# Patient Record
Sex: MALE | Race: WHITE | NOT HISPANIC OR LATINO | Employment: OTHER | URBAN - METROPOLITAN AREA
[De-identification: names, ages, dates, MRNs, and addresses within clinical notes are randomized per-mention and may not be internally consistent; named-entity substitution may affect disease eponyms.]

---

## 2018-02-28 ENCOUNTER — OFFICE VISIT (OUTPATIENT)
Dept: INTERNAL MEDICINE | Facility: CLINIC | Age: 38
End: 2018-02-28
Payer: OTHER GOVERNMENT

## 2018-02-28 VITALS
SYSTOLIC BLOOD PRESSURE: 116 MMHG | DIASTOLIC BLOOD PRESSURE: 72 MMHG | BODY MASS INDEX: 27.22 KG/M2 | HEIGHT: 71 IN | TEMPERATURE: 98 F | WEIGHT: 194.44 LBS | HEART RATE: 84 BPM

## 2018-02-28 DIAGNOSIS — J30.1 CHRONIC SEASONAL ALLERGIC RHINITIS DUE TO POLLEN: ICD-10-CM

## 2018-02-28 DIAGNOSIS — K21.9 GASTROESOPHAGEAL REFLUX DISEASE, ESOPHAGITIS PRESENCE NOT SPECIFIED: ICD-10-CM

## 2018-02-28 DIAGNOSIS — Z30.09 ENCOUNTER FOR VASECTOMY COUNSELING: ICD-10-CM

## 2018-02-28 DIAGNOSIS — K63.5 POLYP OF COLON, UNSPECIFIED PART OF COLON, UNSPECIFIED TYPE: ICD-10-CM

## 2018-02-28 DIAGNOSIS — Z83.3 FAMILY HISTORY OF DIABETES MELLITUS IN MOTHER: ICD-10-CM

## 2018-02-28 DIAGNOSIS — Z00.00 ROUTINE GENERAL MEDICAL EXAMINATION AT A HEALTH CARE FACILITY: Primary | ICD-10-CM

## 2018-02-28 PROCEDURE — 99385 PREV VISIT NEW AGE 18-39: CPT | Mod: S$PBB,,, | Performed by: FAMILY MEDICINE

## 2018-02-28 PROCEDURE — 99204 OFFICE O/P NEW MOD 45 MIN: CPT | Mod: PBBFAC,PO | Performed by: FAMILY MEDICINE

## 2018-02-28 PROCEDURE — 99999 PR PBB SHADOW E&M-NEW PATIENT-LVL IV: CPT | Mod: PBBFAC,,, | Performed by: FAMILY MEDICINE

## 2018-02-28 RX ORDER — CETIRIZINE HYDROCHLORIDE 10 MG/1
10 TABLET ORAL DAILY
COMMUNITY
End: 2018-02-28 | Stop reason: SDUPTHER

## 2018-02-28 RX ORDER — CETIRIZINE HYDROCHLORIDE 10 MG/1
10 TABLET ORAL DAILY
Qty: 90 TABLET | Refills: 3 | Status: SHIPPED | OUTPATIENT
Start: 2018-02-28 | End: 2020-06-15

## 2018-02-28 RX ORDER — ESOMEPRAZOLE MAGNESIUM 40 MG/1
40 CAPSULE, DELAYED RELEASE ORAL DAILY
Qty: 90 CAPSULE | Refills: 3 | Status: SHIPPED | OUTPATIENT
Start: 2018-02-28 | End: 2019-02-11 | Stop reason: SDUPTHER

## 2018-02-28 NOTE — PROGRESS NOTES
Subjective:      Patient ID: Sacha Fregoso is a 37 y.o. male.    Chief Complaint: Establish Care    Disclaimer:  This note is prepared using voice recognition software and as such is likely to have errors and has not been proof read. Please contact me for questions.     Sacha Fregoso is a 37 y.o. male who presents today to establish care.  Patient's mother and sister are patients of mine.  He was previously seeing Dr. bring back.  Has a prolonged history of reflux.  Has been on Nexium now since 2007.  Previous to when trying to get off or even lower his dose to have a reoccurrence of symptoms.  His wife is a nurse practitioner at a heme on group at [facility] and was concerned because he's never had an EGD.  He has been tested however in the past for H. pylori which was negative.  He is not overweight.  He does not eat an abnormal diet that would exacerbate reflux symptoms.  He has had a colonoscopy in the past back in 2007 had some substantial colon polyps that were hyperplastic however on repeat in 2013 it was normal.  He was discussed to resume back to normal colonoscopy in 10 year interval.    Also currently on ALLERGY medicines of Zyrtec daily.    Is interested in possibly pursuing a vasectomy.  Would like a referral to a urologist.    Has a raised rash that's more scarlike area under his left knee that's been there for several years now.  Wife wanted him to see about getting it seen by dermatologist but at this point time he declines wanting to see one.  I did discuss with him that this is something we can easily put him for referral at another point.    Has had elevated cholesterol the past with low HDL.  Father and mother both on cholesterol medicines.  Willing to do this lab work        No results found for: WBC, HGB, HCT, PLT, CHOL, TRIG, HDL, LDLDIRECT, ALT, AST, NA, K, CL, CREATININE, BUN, CO2, TSH, PSA, INR, GLUF, HGBA1C, MICROALBUR    Review of Systems   Constitutional: Negative  "for activity change, appetite change, chills, fatigue and unexpected weight change.   HENT: Positive for postnasal drip and rhinorrhea. Negative for congestion, ear pain, sneezing, sore throat and trouble swallowing.    Eyes: Negative for pain and visual disturbance.   Respiratory: Negative for cough and shortness of breath.    Cardiovascular: Negative for chest pain and leg swelling.   Gastrointestinal: Positive for abdominal pain. Negative for constipation, diarrhea, nausea and vomiting.   Endocrine: Negative for cold intolerance and heat intolerance.   Genitourinary: Negative for difficulty urinating, dysuria and flank pain.   Musculoskeletal: Negative for arthralgias, back pain, joint swelling and neck pain.   Skin: Negative for color change and rash.   Neurological: Negative for dizziness, seizures and headaches.   Psychiatric/Behavioral: Negative for behavioral problems, dysphoric mood and sleep disturbance.     Objective:     Vitals:    02/28/18 0749   BP: 116/72   Pulse: 84   Temp: 97.6 °F (36.4 °C)   TempSrc: Tympanic   Weight: 88.2 kg (194 lb 7.1 oz)   Height: 5' 11" (1.803 m)     Physical Exam   Constitutional: He is oriented to person, place, and time. He appears well-developed and well-nourished. No distress.   HENT:   Head: Normocephalic and atraumatic.   Right Ear: External ear normal.   Left Ear: External ear normal.   Nose: Nose normal.   Mouth/Throat: Oropharynx is clear and moist.   Eyes: EOM are normal. Pupils are equal, round, and reactive to light.   Neck: Normal range of motion. Neck supple. No thyromegaly present.   Cardiovascular: Normal rate and regular rhythm.  Exam reveals no gallop and no friction rub.    No murmur heard.  Pulmonary/Chest: Effort normal and breath sounds normal. No respiratory distress.   Abdominal: Soft. Bowel sounds are normal. He exhibits no distension. There is no tenderness. There is no rebound.   Musculoskeletal: Normal range of motion.   Lymphadenopathy:     He " has no cervical adenopathy.   Neurological: He is alert and oriented to person, place, and time. Coordination normal.   Skin: Skin is warm and dry.   Psychiatric: He has a normal mood and affect.   Vitals reviewed.    Assessment:     1. Routine general medical examination at a health care facility    2. Encounter for vasectomy counseling    3. Gastroesophageal reflux disease, esophagitis presence not specified    4. Chronic seasonal allergic rhinitis due to pollen    5. Polyp of colon, unspecified part of colon, unspecified type    6. Family history of diabetes mellitus in mother      Plan:   Sacha was seen today for establish care.    Diagnoses and all orders for this visit:    Routine general medical examination at a health care facility- - labs ordered. Discussed Health Maintenance issues.     -     TSH; Future  -     Lipid panel; Future  -     Comprehensive metabolic panel; Future  -     Hemoglobin A1c; Future  -     CBC auto differential; Future    Encounter for vasectomy counseling- refer to urology.   -     Ambulatory referral to Urology    Gastroesophageal reflux disease, esophagitis presence not specified  Comments:  never had egd, can't get off nexium 40mg, refer for egd. neg h pylori testing in past. on meds for > 11yrs.   Orders:  -     TSH; Future  -     Lipid panel; Future  -     Comprehensive metabolic panel; Future  -     Hemoglobin A1c; Future  -     CBC auto differential; Future  -     Ambulatory referral to Gastroenterology    Chronic seasonal allergic rhinitis due to pollen- cont with zyrtec daily.   -     TSH; Future  -     Lipid panel; Future  -     Comprehensive metabolic panel; Future  -     Hemoglobin A1c; Future  -     CBC auto differential; Future    Polyp of colon, unspecified part of colon, unspecified type- reviewed, next colonoscopy due in 2023    Family history of diabetes mellitus in mother- screen for dm in patient.   -     TSH; Future  -     Lipid panel; Future  -      Comprehensive metabolic panel; Future  -     Hemoglobin A1c; Future  -     CBC auto differential; Future    Other orders  -     esomeprazole (NEXIUM) 40 MG capsule; Take 1 capsule (40 mg total) by mouth once daily.  -     cetirizine (ZYRTEC) 10 MG tablet; Take 1 tablet (10 mg total) by mouth once daily.            Follow-up in about 1 year (around 2/28/2019) for physical with Dr AVENDANO.

## 2018-03-07 ENCOUNTER — PATIENT MESSAGE (OUTPATIENT)
Dept: INTERNAL MEDICINE | Facility: CLINIC | Age: 38
End: 2018-03-07

## 2018-03-13 ENCOUNTER — LAB VISIT (OUTPATIENT)
Dept: LAB | Facility: HOSPITAL | Age: 38
End: 2018-03-13
Attending: FAMILY MEDICINE
Payer: OTHER GOVERNMENT

## 2018-03-13 DIAGNOSIS — J30.1 CHRONIC SEASONAL ALLERGIC RHINITIS DUE TO POLLEN: ICD-10-CM

## 2018-03-13 DIAGNOSIS — K21.9 GASTROESOPHAGEAL REFLUX DISEASE, ESOPHAGITIS PRESENCE NOT SPECIFIED: ICD-10-CM

## 2018-03-13 DIAGNOSIS — Z83.3 FAMILY HISTORY OF DIABETES MELLITUS IN MOTHER: ICD-10-CM

## 2018-03-13 DIAGNOSIS — Z00.00 ROUTINE GENERAL MEDICAL EXAMINATION AT A HEALTH CARE FACILITY: ICD-10-CM

## 2018-03-13 LAB
ALBUMIN SERPL BCP-MCNC: 4 G/DL
ALP SERPL-CCNC: 72 U/L
ALT SERPL W/O P-5'-P-CCNC: 33 U/L
ANION GAP SERPL CALC-SCNC: 11 MMOL/L
AST SERPL-CCNC: 23 U/L
BASOPHILS # BLD AUTO: 0.03 K/UL
BASOPHILS NFR BLD: 0.5 %
BILIRUB SERPL-MCNC: 0.5 MG/DL
BUN SERPL-MCNC: 14 MG/DL
CALCIUM SERPL-MCNC: 9.6 MG/DL
CHLORIDE SERPL-SCNC: 105 MMOL/L
CHOLEST SERPL-MCNC: 167 MG/DL
CHOLEST/HDLC SERPL: 4.4 {RATIO}
CO2 SERPL-SCNC: 26 MMOL/L
CREAT SERPL-MCNC: 0.9 MG/DL
DIFFERENTIAL METHOD: NORMAL
EOSINOPHIL # BLD AUTO: 0.1 K/UL
EOSINOPHIL NFR BLD: 1.9 %
ERYTHROCYTE [DISTWIDTH] IN BLOOD BY AUTOMATED COUNT: 12.6 %
EST. GFR  (AFRICAN AMERICAN): >60 ML/MIN/1.73 M^2
EST. GFR  (NON AFRICAN AMERICAN): >60 ML/MIN/1.73 M^2
ESTIMATED AVG GLUCOSE: 88 MG/DL
GLUCOSE SERPL-MCNC: 81 MG/DL
HBA1C MFR BLD HPLC: 4.7 %
HCT VFR BLD AUTO: 44.5 %
HDLC SERPL-MCNC: 38 MG/DL
HDLC SERPL: 22.8 %
HGB BLD-MCNC: 15 G/DL
IMM GRANULOCYTES # BLD AUTO: 0.02 K/UL
IMM GRANULOCYTES NFR BLD AUTO: 0.3 %
LDLC SERPL CALC-MCNC: 101.6 MG/DL
LYMPHOCYTES # BLD AUTO: 2.3 K/UL
LYMPHOCYTES NFR BLD: 38.5 %
MCH RBC QN AUTO: 28.5 PG
MCHC RBC AUTO-ENTMCNC: 33.7 G/DL
MCV RBC AUTO: 85 FL
MONOCYTES # BLD AUTO: 0.3 K/UL
MONOCYTES NFR BLD: 5.6 %
NEUTROPHILS # BLD AUTO: 3.1 K/UL
NEUTROPHILS NFR BLD: 53.2 %
NONHDLC SERPL-MCNC: 129 MG/DL
NRBC BLD-RTO: 0 /100 WBC
PLATELET # BLD AUTO: 192 K/UL
PMV BLD AUTO: 11.4 FL
POTASSIUM SERPL-SCNC: 4 MMOL/L
PROT SERPL-MCNC: 6.8 G/DL
RBC # BLD AUTO: 5.26 M/UL
SODIUM SERPL-SCNC: 142 MMOL/L
TRIGL SERPL-MCNC: 137 MG/DL
TSH SERPL DL<=0.005 MIU/L-ACNC: 2.91 UIU/ML
WBC # BLD AUTO: 5.87 K/UL

## 2018-03-13 PROCEDURE — 83036 HEMOGLOBIN GLYCOSYLATED A1C: CPT

## 2018-03-13 PROCEDURE — 36415 COLL VENOUS BLD VENIPUNCTURE: CPT | Mod: PO

## 2018-03-13 PROCEDURE — 80053 COMPREHEN METABOLIC PANEL: CPT

## 2018-03-13 PROCEDURE — 84443 ASSAY THYROID STIM HORMONE: CPT

## 2018-03-13 PROCEDURE — 80061 LIPID PANEL: CPT

## 2018-03-13 PROCEDURE — 85025 COMPLETE CBC W/AUTO DIFF WBC: CPT

## 2018-04-11 ENCOUNTER — INITIAL CONSULT (OUTPATIENT)
Dept: GASTROENTEROLOGY | Facility: CLINIC | Age: 38
End: 2018-04-11
Payer: OTHER GOVERNMENT

## 2018-04-11 VITALS
HEART RATE: 88 BPM | HEIGHT: 71 IN | WEIGHT: 199.31 LBS | SYSTOLIC BLOOD PRESSURE: 132 MMHG | BODY MASS INDEX: 27.9 KG/M2 | DIASTOLIC BLOOD PRESSURE: 76 MMHG

## 2018-04-11 DIAGNOSIS — K21.9 GASTROESOPHAGEAL REFLUX DISEASE, ESOPHAGITIS PRESENCE NOT SPECIFIED: Primary | ICD-10-CM

## 2018-04-11 PROCEDURE — 99203 OFFICE O/P NEW LOW 30 MIN: CPT | Mod: S$PBB,,, | Performed by: NURSE PRACTITIONER

## 2018-04-11 PROCEDURE — 99213 OFFICE O/P EST LOW 20 MIN: CPT | Mod: PBBFAC,PO | Performed by: NURSE PRACTITIONER

## 2018-04-11 PROCEDURE — 99999 PR PBB SHADOW E&M-EST. PATIENT-LVL III: CPT | Mod: PBBFAC,,, | Performed by: NURSE PRACTITIONER

## 2018-04-11 NOTE — PROGRESS NOTES
Clinic Consult:  [facility] Consultation Note    Reason for Consult:  The encounter diagnosis was Gastroesophageal reflux disease, esophagitis presence not specified.    PCP: Zuri Knowles   [id number] [facility] [place] [address]    HPI:  This is a 37 y.o. male here for evaluation of the above. He was referred to me by Dr. Knowles. He has a history of GERD and has been on Nexium since 2007. Medication works well and he denies any breakthrough symptoms. He has never had a EGD before and is interested in having procedure done. No abdominal pain, hematochezia, melena, nausea, vomiting, or weight loss.      Review of Systems   Constitutional: Negative for fever, malaise/fatigue and weight loss.   HENT: Negative for sore throat.    Respiratory: Negative for cough and wheezing.    Cardiovascular: Negative for chest pain and palpitations.   Gastrointestinal: Positive for heartburn. Negative for abdominal pain, blood in stool, constipation, diarrhea, melena, nausea and vomiting.   Genitourinary: Negative for dysuria and frequency.   Musculoskeletal: Negative for back pain, joint pain, myalgias and neck pain.   Skin: Negative for itching and rash.   Neurological: Negative for dizziness, speech change, seizures, loss of consciousness and headaches.   Psychiatric/Behavioral: Negative for depression and substance abuse. The patient is not nervous/anxious.        Medical History:  has a past medical history of Colon polyp (2008); GERD (gastroesophageal reflux disease); and Seasonal allergies.    Surgical History:  has a past surgical history that includes wisdoom teeth and Colonoscopy.    Family History: family history includes Diabetes in his mother; Hyperlipidemia in his father..     Social History:  reports that he has quit smoking. His smoking use included Cigarettes. He quit after 2.00 years of use. His smokeless tobacco use includes Chew. He reports that he drinks about 0.6 oz of alcohol per week . He  "reports that he does not use drugs.    Allergies: Reviewed    Home Medications:   Current Outpatient Prescriptions on File Prior to Visit   Medication Sig Dispense Refill    cetirizine (ZYRTEC) 10 MG tablet Take 1 tablet (10 mg total) by mouth once daily. 90 tablet 3    esomeprazole (NEXIUM) 40 MG capsule Take 1 capsule (40 mg total) by mouth once daily. 90 capsule 3     No current facility-administered medications on file prior to visit.        Physical Exam:  /76   Pulse 88   Ht 5' 11" (1.803 m)   Wt 90.4 kg (199 lb 4.7 oz)   BMI 27.80 kg/m²   Body mass index is 27.8 kg/m².  Physical Exam   Constitutional: He is oriented to person, place, and time and well-developed, well-nourished, and in no distress. No distress.   HENT:   Head: Normocephalic.   Eyes: Conjunctivae are normal. Pupils are equal, round, and reactive to light.   Cardiovascular: Normal rate, regular rhythm and normal heart sounds.    Pulmonary/Chest: Effort normal and breath sounds normal. No respiratory distress.   Abdominal: Soft. Bowel sounds are normal. He exhibits no distension. There is no tenderness.   Neurological: He is alert and oriented to person, place, and time. No cranial nerve deficit.   Skin: Skin is warm and dry. No rash noted.   Psychiatric: Mood and affect normal.       Labs: Pertinent labs reviewed.    Assessment:  1. Gastroesophageal reflux disease, esophagitis presence not specified         Recommendations:  - will plan for EGD fur surveillance of GERD and to assess for any complications of GERD or long term acid suppression.   -     Case request GI: ESOPHAGOGASTRODUODENOSCOPY (EGD)    Follow-up if symptoms worsen or fail to improve.    Thank you so much for allowing me to participate in the care of CELINA Dawn  "

## 2018-04-11 NOTE — LETTER
April 11, 2018      Zuri Knowles MD  [address] [facility]  [address]  [place] [address]           [facility]  [address] [facility] [place] [address]  Phone: [phone]  Fax: [phone]          Patient: Sacha Fregoso   MR Number: [id number]   YOB: 1980   Date of Visit: 4/11/2018       Dear Dr. Zuri Knowles:    Thank you for referring Sacha Fregoso to me for evaluation. Attached you will find relevant portions of my assessment and plan of care.    If you have questions, please do not hesitate to call me. I look forward to following Sacha Fregoso along with you.    Sincerely,    Lori Emery, ASAEL    Enclosure  CC:  No Recipients    If you would like to receive this communication electronically, please contact [email or web address] or [phone] to request more information on [employer] Link access.    For providers and/or their staff who would like to refer a patient to [facility], please contact us through our one-stop-shop provider referral line, [facility], at [phone].    If you feel you have received this communication in error or would no longer like to receive these types of communications, please e-mail [email or web address]

## 2018-04-19 ENCOUNTER — OFFICE VISIT (OUTPATIENT)
Dept: UROLOGY | Facility: CLINIC | Age: 38
End: 2018-04-19
Payer: OTHER GOVERNMENT

## 2018-04-19 VITALS — HEIGHT: 71 IN | WEIGHT: 192.88 LBS | BODY MASS INDEX: 27 KG/M2

## 2018-04-19 DIAGNOSIS — Z30.09 UNWANTED FERTILITY: Primary | ICD-10-CM

## 2018-04-19 PROCEDURE — 99244 OFF/OP CNSLTJ NEW/EST MOD 40: CPT | Mod: S$PBB,,, | Performed by: UROLOGY

## 2018-04-19 PROCEDURE — 99999 PR PBB SHADOW E&M-EST. PATIENT-LVL II: CPT | Mod: PBBFAC,,, | Performed by: UROLOGY

## 2018-04-19 PROCEDURE — 99212 OFFICE O/P EST SF 10 MIN: CPT | Mod: PBBFAC | Performed by: UROLOGY

## 2018-04-19 RX ORDER — DIAZEPAM 10 MG/1
10 TABLET ORAL
Qty: 1 TABLET | Refills: 0 | Status: SHIPPED | OUTPATIENT
Start: 2018-04-19 | End: 2019-05-06

## 2018-04-19 NOTE — PROGRESS NOTES
Chief Complaint: Unwanted fertility    HPI:   4/19/18: 36 yo man has three children and desires no more.  No abd/pelvic pain and no exac/rel factors.  No hematuria.  No urolithiasis.  No urinary bother.  No  history.  Normal sexual function.  Referred by Dr. Knowles.    Allergies:  Patient has no known allergies.    Medications:  has a current medication list which includes the following prescription(s): cetirizine and esomeprazole.    Review of Systems:  General: No fever, chills, fatigability, or weight loss.  Skin: No rashes, itching, or changes in color or texture of skin.  Chest: Denies CASTANON, cyanosis, wheezing, cough, and sputum production.  Abdomen: Appetite fine. No weight loss. Denies diarrhea, abdominal pain, hematemesis, or blood in stool.  Musculoskeletal: No joint stiffness or swelling. Denies back pain.  : As above.  All other review of systems negative.    PMH:   has a past medical history of Colon polyp (2008); GERD (gastroesophageal reflux disease); and Seasonal allergies.    PSH:   has a past surgical history that includes wisdoom teeth and Colonoscopy.    FamHx: family history includes Diabetes in his mother; Hyperlipidemia in his father.    SocHx:  reports that he has quit smoking. His smoking use included Cigarettes. He quit after 2.00 years of use. His smokeless tobacco use includes Chew. He reports that he drinks about 0.6 oz of alcohol per week . He reports that he does not use drugs.      Physical Exam:  There were no vitals filed for this visit.  General: A&Ox3, no apparent distress, no deformities  Neck: No masses, normal thyroid  Lungs: normal inspiration, no use of accessory muscles  Heart: normal pulse, no arrhythmias  Abdomen: Soft, NT, ND, no masses, no hernias, no hepatosplenomegaly  Lymphatic: Neck and groin nodes negative  Skin: The skin is warm and dry. No jaundice.  Ext: No c/c/e.  : Test desc lacy, no abnormalities of epididymus. Penis normal, with normal penile and scrotal  skin. Meatus normal.     Labs/Studies:   Urinalysis performed in clinic, summary: UA normal    Impression/Plan:   1. Risks/benefits of vasectomy reviewed, valium rx.

## 2018-04-19 NOTE — LETTER
April 19, 2018      Zuri Knowles MD  [address]  [address]  [place] [address]           [facility]  [id number] [place] [address] Drive  [place] [address]  Phone: [phone]  Fax: [phone]          Patient: Sacha Fregoso   MR Number: [id number]   YOB: 1980   Date of Visit: 4/19/2018       Dear Dr. Zuri Knowles:    Thank you for referring Sacha Fregoso to me for evaluation. Attached you will find relevant portions of my assessment and plan of care.    If you have questions, please do not hesitate to call me. I look forward to following Sacha Fregoso along with you.    Sincerely,    Blayne Davalos IV, MD    Enclosure  CC:  No Recipients    If you would like to receive this communication electronically, please contact [email or web address] or [phone] to request more information on [email or web address] Link access.    For providers and/or their staff who would like to refer a patient to [facility], please contact us through our one-stop-shop provider referral line, [facility], at [phone].    If you feel you have received this communication in error or would no longer like to receive these types of communications, please e-mail [email or web address]

## 2018-04-24 ENCOUNTER — ANESTHESIA EVENT (OUTPATIENT)
Dept: ENDOSCOPY | Facility: HOSPITAL | Age: 38
End: 2018-04-24
Payer: OTHER GOVERNMENT

## 2018-04-24 ENCOUNTER — ANESTHESIA (OUTPATIENT)
Dept: ENDOSCOPY | Facility: HOSPITAL | Age: 38
End: 2018-04-24
Payer: OTHER GOVERNMENT

## 2018-04-24 ENCOUNTER — HOSPITAL ENCOUNTER (OUTPATIENT)
Facility: HOSPITAL | Age: 38
Discharge: HOME | End: 2018-04-24
Attending: INTERNAL MEDICINE | Admitting: INTERNAL MEDICINE
Payer: OTHER GOVERNMENT

## 2018-04-24 ENCOUNTER — SURGERY (OUTPATIENT)
Age: 38
End: 2018-04-24

## 2018-04-24 VITALS
DIASTOLIC BLOOD PRESSURE: 66 MMHG | SYSTOLIC BLOOD PRESSURE: 113 MMHG | WEIGHT: 190 LBS | RESPIRATION RATE: 18 BRPM | HEART RATE: 55 BPM | HEIGHT: 71 IN | BODY MASS INDEX: 26.6 KG/M2 | TEMPERATURE: 98 F | OXYGEN SATURATION: 97 %

## 2018-04-24 DIAGNOSIS — K21.9 GERD (GASTROESOPHAGEAL REFLUX DISEASE): ICD-10-CM

## 2018-04-24 PROCEDURE — 37000009 HC ANESTHESIA EA ADD 15 MINS: Performed by: INTERNAL MEDICINE

## 2018-04-24 PROCEDURE — 27201012 HC FORCEPS, HOT/COLD, DISP: Performed by: INTERNAL MEDICINE

## 2018-04-24 PROCEDURE — 00731 ANES UPR GI NDSC PX NOS: CPT | Performed by: INTERNAL MEDICINE

## 2018-04-24 PROCEDURE — 25000003 PHARM REV CODE 250: Performed by: INTERNAL MEDICINE

## 2018-04-24 PROCEDURE — 37000008 HC ANESTHESIA 1ST 15 MINUTES: Performed by: INTERNAL MEDICINE

## 2018-04-24 PROCEDURE — 63600175 PHARM REV CODE 636 W HCPCS: Performed by: NURSE ANESTHETIST, CERTIFIED REGISTERED

## 2018-04-24 PROCEDURE — 43239 EGD BIOPSY SINGLE/MULTIPLE: CPT | Performed by: INTERNAL MEDICINE

## 2018-04-24 PROCEDURE — 88305 TISSUE EXAM BY PATHOLOGIST: CPT | Performed by: PATHOLOGY

## 2018-04-24 PROCEDURE — 88305 TISSUE EXAM BY PATHOLOGIST: CPT | Mod: 26,,, | Performed by: PATHOLOGY

## 2018-04-24 PROCEDURE — 25000003 PHARM REV CODE 250: Performed by: NURSE ANESTHETIST, CERTIFIED REGISTERED

## 2018-04-24 PROCEDURE — 43239 EGD BIOPSY SINGLE/MULTIPLE: CPT | Mod: ,,, | Performed by: INTERNAL MEDICINE

## 2018-04-24 RX ORDER — SODIUM CHLORIDE, SODIUM LACTATE, POTASSIUM CHLORIDE, CALCIUM CHLORIDE 600; 310; 30; 20 MG/100ML; MG/100ML; MG/100ML; MG/100ML
INJECTION, SOLUTION INTRAVENOUS CONTINUOUS PRN
Status: DISCONTINUED | OUTPATIENT
Start: 2018-04-24 | End: 2018-04-24

## 2018-04-24 RX ORDER — SODIUM CHLORIDE, SODIUM LACTATE, POTASSIUM CHLORIDE, CALCIUM CHLORIDE 600; 310; 30; 20 MG/100ML; MG/100ML; MG/100ML; MG/100ML
INJECTION, SOLUTION INTRAVENOUS CONTINUOUS
Status: DISCONTINUED | OUTPATIENT
Start: 2018-04-24 | End: 2018-04-24 | Stop reason: HOSPADM

## 2018-04-24 RX ORDER — PROPOFOL 10 MG/ML
INJECTION, EMULSION INTRAVENOUS
Status: DISCONTINUED | OUTPATIENT
Start: 2018-04-24 | End: 2018-04-24

## 2018-04-24 RX ORDER — LIDOCAINE HCL/PF 100 MG/5ML
SYRINGE (ML) INTRAVENOUS
Status: DISCONTINUED | OUTPATIENT
Start: 2018-04-24 | End: 2018-04-24

## 2018-04-24 RX ADMIN — LIDOCAINE HYDROCHLORIDE 100 MG: 20 INJECTION, SOLUTION INTRAVENOUS at 02:04

## 2018-04-24 RX ADMIN — SODIUM CHLORIDE, SODIUM LACTATE, POTASSIUM CHLORIDE, AND CALCIUM CHLORIDE: 600; 310; 30; 20 INJECTION, SOLUTION INTRAVENOUS at 01:04

## 2018-04-24 RX ADMIN — SODIUM CHLORIDE, SODIUM LACTATE, POTASSIUM CHLORIDE, AND CALCIUM CHLORIDE: .6; .31; .03; .02 INJECTION, SOLUTION INTRAVENOUS at 01:04

## 2018-04-24 RX ADMIN — PROPOFOL 60 MG: 10 INJECTION, EMULSION INTRAVENOUS at 02:04

## 2018-04-24 RX ADMIN — PROPOFOL 140 MG: 10 INJECTION, EMULSION INTRAVENOUS at 02:04

## 2018-04-24 NOTE — ANESTHESIA PREPROCEDURE EVALUATION
04/24/2018  Sacha Fregoso is a 37 y.o., male.    Anesthesia Evaluation    I have reviewed the Patient Summary Reports.    I have reviewed the Nursing Notes.   I have reviewed the Medications.     Review of Systems  Anesthesia Hx:  No problems with previous Anesthesia  History of prior surgery of interest to airway management or planning: Previous anesthesia: General, MAC Denies Family Hx of Anesthesia complications.   Denies Personal Hx of Anesthesia complications.   Social:  Former Smoker, Social Alcohol Use Quit 2013   Hematology/Oncology:  Hematology Normal   Oncology Normal     EENT/Dental:EENT/Dental Normal   Cardiovascular:  Cardiovascular Normal     Pulmonary:  Pulmonary Normal    Renal/:  Renal/ Normal     Hepatic/GI:   GERD    Musculoskeletal:  Musculoskeletal Normal    Neurological:  Neurology Normal    Endocrine:  Endocrine Normal    Psych:  Psychiatric Normal           Physical Exam  General:  Well nourished    Airway/Jaw/Neck:  Airway Findings: Mouth Opening: Normal Tongue: Normal  Mallampati: I  Improves to I with phonation.  TM Distance: Normal, at least 6 cm       Chest/Lungs:  Chest/Lungs Findings: Normal Respiratory Rate, Clear to auscultation     Heart/Vascular:  Heart Findings: Rate: Normal        Mental Status:  Mental Status Findings:  Cooperative, Alert and Oriented         Anesthesia Plan  Type of Anesthesia, risks & benefits discussed:  Anesthesia Type:  MAC  Patient's Preference:   Intra-op Monitoring Plan: standard ASA monitors  Intra-op Monitoring Plan Comments:   Post Op Pain Control Plan:   Post Op Pain Control Plan Comments:   Induction:   IV  Beta Blocker:  Patient is not currently on a Beta-Blocker (No further documentation required).       Informed Consent: Patient understands risks and agrees with Anesthesia plan.  Questions answered. Anesthesia consent signed  with patient.  ASA Score: 2     Day of Surgery Review of History & Physical: I have interviewed and examined the patient. I have reviewed the patient's H&P dated: 04/24/18. There are no significant changes.  H&P update referred to the provider.         Ready For Surgery From Anesthesia Perspective.

## 2018-04-24 NOTE — ANESTHESIA POSTPROCEDURE EVALUATION
"Anesthesia Post Evaluation    Patient: Sacha Fregoso    Procedure(s) Performed: Procedure(s) (LRB):  ESOPHAGOGASTRODUODENOSCOPY (EGD) (N/A)    Final Anesthesia Type: MAC  Patient location during evaluation: PACU  Patient participation: Yes- Able to Participate  Level of consciousness: awake and alert and oriented  Post-procedure vital signs: reviewed and stable  Pain management: adequate  Airway patency: patent  PONV status at discharge: No PONV  Anesthetic complications: no      Cardiovascular status: blood pressure returned to baseline  Respiratory status: unassisted, spontaneous ventilation and room air  Hydration status: euvolemic  Follow-up not needed.        Visit Vitals  BP (!) 102/52 (BP Location: Left arm, Patient Position: Lying)   Pulse (!) 56   Temp 36.5 °C (97.7 °F) (Oral)   Resp 16   Ht 5' 11" (1.803 m)   Wt 86.2 kg (190 lb)   SpO2 96%   BMI 26.50 kg/m²       Pain/Sagar Score: Pain Assessment Performed: Yes (4/24/2018 12:53 PM)  Presence of Pain: denies (4/24/2018 12:53 PM)  Sagar Score: 9 (4/24/2018  2:15 PM)      "

## 2018-04-24 NOTE — PROVATION PATIENT INSTRUCTIONS
Discharge Summary/Instructions after an Endoscopic Procedure  Patient Name: Sacha Fregoso  Patient MRN: [id number]  Patient YOB: 1980 Tuesday, April 24, 2018 Charlotte Cowart MD  RESTRICTIONS:  During your procedure today, you received medications for sedation.  These   medications may affect your judgment, balance and coordination.  Therefore,   for 24 hours, you have the following restrictions:   - DO NOT drive a car, operate machinery, make legal/financial decisions,   sign important papers or drink alcohol.    ACTIVITY:  The following day: return to full activity including work, except no heavy   lifting, straining or running for 3 days if polyps were removed.  DIET:  Eat and drink normally unless instructed otherwise.     TREATMENT FOR COMMON SIDE EFFECTS:  - Mild abdominal pain, nausea, belching, bloating or excessive gas:  rest,   eat lightly and use a heating pad.  - Sore Throat: treat with throat lozenges and/or gargle with warm salt   water.  - Because air was used during the procedure, expelling large amounts of air   from your rectum or belching is normal.  - If a bowel prep was taken, you may not have a bowel movement for 1-3 days.    This is normal.  SYMPTOMS TO WATCH FOR AND REPORT TO YOUR PHYSICIAN:  1. Abdominal pain or bloating, other than gas cramps.  2. Chest pain.  3. Back pain.  4. Signs of infection such as: chills or fever occurring within 24 hours   after the procedure.  5. Rectal bleeding, which would show as bright red, maroon, or black stools.   (A tablespoon of blood from the rectum is not serious, especially if   hemorrhoids are present.)  6. Vomiting.  7. Weakness or dizziness.  GO DIRECTLY TO THE NEAREST EMERGENCY ROOM IF YOU HAVE ANY OF THE FOLLOWING:      Difficulty breathing              Chills and/or fever over 101 F   Persistent vomiting and/or vomiting blood   Severe abdominal pain   Severe chest pain   Black, tarry stools   Bleeding- more than one  tablespoon   Any other symptom or condition that you feel may need urgent attention  Your doctor recommends these additional instructions:  If any biopsies were taken, your doctors clinic will contact you in 1 to 2   weeks with any results.  - Patient has a contact number available for emergencies.  The signs and   symptoms of potential delayed complications were discussed with the   patient.  Return to normal activities tomorrow.  Written discharge   instructions were provided to the patient.   - Resume previous diet.   - Continue present medications.   - Await pathology results.   - Return to primary care physician as previously scheduled.   - Repeat upper endoscopy for surveillance based on pathology results.   - Follow an antireflux regimen indefinitely.   - Discharge patient to home (with escort).  For questions, problems or results please call your physician Charlotte Cowart MD at Work:  [phone]  If you have any questions about the above instructions, call the GI   department at [phone] or call the endoscopy unit at [phone]   from 7am until 3 pm.  [facility], EMERGENCY ROOM PHONE NUMBER:   [phone]  IF A COMPLICATION OR EMERGENCY SITUATION ARISES AND YOU ARE UNABLE TO REACH   YOUR PHYSICIAN - GO DIRECTLY TO THE EMERGENCY ROOM.  I have read or have had read to me these discharge instructions for my   procedure and have received a written copy.  I understand these   instructions and will follow-up with my physician if I have any questions.     __________________________________       _____________________________________  Nurse Signature                                          Patient/Designated   Responsible Party Signature  Charlotte Cowart MD  4/24/2018 2:13:23 PM  This report has been verified and signed electronically.

## 2018-04-24 NOTE — H&P (VIEW-ONLY)
Clinic Consult:  [facility] Consultation Note    Reason for Consult:  The encounter diagnosis was Gastroesophageal reflux disease, esophagitis presence not specified.    PCP: Zuri Knowles   [address] [place] [facility] [address]    HPI:  This is a 37 y.o. male here for evaluation of the above. He was referred to me by Dr. Knowles. He has a history of GERD and has been on Nexium since 2007. Medication works well and he denies any breakthrough symptoms. He has never had a EGD before and is interested in having procedure done. No abdominal pain, hematochezia, melena, nausea, vomiting, or weight loss.      Review of Systems   Constitutional: Negative for fever, malaise/fatigue and weight loss.   HENT: Negative for sore throat.    Respiratory: Negative for cough and wheezing.    Cardiovascular: Negative for chest pain and palpitations.   Gastrointestinal: Positive for heartburn. Negative for abdominal pain, blood in stool, constipation, diarrhea, melena, nausea and vomiting.   Genitourinary: Negative for dysuria and frequency.   Musculoskeletal: Negative for back pain, joint pain, myalgias and neck pain.   Skin: Negative for itching and rash.   Neurological: Negative for dizziness, speech change, seizures, loss of consciousness and headaches.   Psychiatric/Behavioral: Negative for depression and substance abuse. The patient is not nervous/anxious.        Medical History:  has a past medical history of Colon polyp (2008); GERD (gastroesophageal reflux disease); and Seasonal allergies.    Surgical History:  has a past surgical history that includes wisdoom teeth and Colonoscopy.    Family History: family history includes Diabetes in his mother; Hyperlipidemia in his father..     Social History:  reports that he has quit smoking. His smoking use included Cigarettes. He quit after 2.00 years of use. His smokeless tobacco use includes Chew. He reports that he drinks about 0.6 oz of alcohol per week . He  "reports that he does not use drugs.    Allergies: Reviewed    Home Medications:   Current Outpatient Prescriptions on File Prior to Visit   Medication Sig Dispense Refill    cetirizine (ZYRTEC) 10 MG tablet Take 1 tablet (10 mg total) by mouth once daily. 90 tablet 3    esomeprazole (NEXIUM) 40 MG capsule Take 1 capsule (40 mg total) by mouth once daily. 90 capsule 3     No current facility-administered medications on file prior to visit.        Physical Exam:  /76   Pulse 88   Ht 5' 11" (1.803 m)   Wt 90.4 kg (199 lb 4.7 oz)   BMI 27.80 kg/m²   Body mass index is 27.8 kg/m².  Physical Exam   Constitutional: He is oriented to person, place, and time and well-developed, well-nourished, and in no distress. No distress.   HENT:   Head: Normocephalic.   Eyes: Conjunctivae are normal. Pupils are equal, round, and reactive to light.   Cardiovascular: Normal rate, regular rhythm and normal heart sounds.    Pulmonary/Chest: Effort normal and breath sounds normal. No respiratory distress.   Abdominal: Soft. Bowel sounds are normal. He exhibits no distension. There is no tenderness.   Neurological: He is alert and oriented to person, place, and time. No cranial nerve deficit.   Skin: Skin is warm and dry. No rash noted.   Psychiatric: Mood and affect normal.       Labs: Pertinent labs reviewed.    Assessment:  1. Gastroesophageal reflux disease, esophagitis presence not specified         Recommendations:  - will plan for EGD fur surveillance of GERD and to assess for any complications of GERD or long term acid suppression.   -     Case request GI: ESOPHAGOGASTRODUODENOSCOPY (EGD)    Follow-up if symptoms worsen or fail to improve.    Thank you so much for allowing me to participate in the care of CELINA Dawn  "

## 2018-04-24 NOTE — ANESTHESIA RELEASE NOTE
"Anesthesia Release from PACU Note    Patient: Sacha Fregoso    Procedure(s) Performed: Procedure(s) (LRB):  ESOPHAGOGASTRODUODENOSCOPY (EGD) (N/A)    Anesthesia type: MAC    Post pain: Adequate analgesia    Post assessment: no apparent anesthetic complications, tolerated procedure well and no evidence of recall    Last Vitals:   Visit Vitals  BP (!) 102/52 (BP Location: Left arm, Patient Position: Lying)   Pulse (!) 56   Temp 36.5 °C (97.7 °F) (Oral)   Resp 16   Ht 5' 11" (1.803 m)   Wt 86.2 kg (190 lb)   SpO2 96%   BMI 26.50 kg/m²       Post vital signs: stable    Level of consciousness: awake and alert     Nausea/Vomiting: no nausea/no vomiting    Complications: none    Airway Patency: patent    Respiratory: unassisted, spontaneous ventilation, room air    Cardiovascular: stable    Hydration: euvolemic  "

## 2018-04-24 NOTE — TRANSFER OF CARE
"Anesthesia Transfer of Care Note    Patient: Sacha Fregoso    Procedure(s) Performed: Procedure(s) (LRB):  ESOPHAGOGASTRODUODENOSCOPY (EGD) (N/A)    Patient location: PACU    Anesthesia Type: MAC    Transport from OR: Transported from OR on room air with adequate spontaneous ventilation    Post pain: adequate analgesia    Post assessment: no apparent anesthetic complications    Post vital signs: stable    Level of consciousness: sedated    Nausea/Vomiting: no nausea/vomiting    Complications: none    Transfer of care protocol was followed      Last vitals:   Visit Vitals  BP (!) 102/52 (BP Location: Left arm, Patient Position: Lying)   Pulse (!) 56   Temp 36.5 °C (97.7 °F) (Oral)   Resp 16   Ht 5' 11" (1.803 m)   Wt 86.2 kg (190 lb)   SpO2 96%   BMI 26.50 kg/m²     "

## 2018-04-26 ENCOUNTER — TELEPHONE (OUTPATIENT)
Dept: UROLOGY | Facility: CLINIC | Age: 38
End: 2018-04-26

## 2018-04-26 NOTE — TELEPHONE ENCOUNTER
----- Message from Nayla Carter sent at 4/26/2018  9:17 AM CDT -----  Contact: Pt  Pt called and stated he needed to speak to the nurse to reschedule his procedure. He can eb reached at [phone].    Thanks,  TF

## 2018-06-14 ENCOUNTER — PROCEDURE VISIT (OUTPATIENT)
Dept: UROLOGY | Facility: CLINIC | Age: 38
End: 2018-06-14
Payer: OTHER GOVERNMENT

## 2018-06-14 VITALS
DIASTOLIC BLOOD PRESSURE: 82 MMHG | BODY MASS INDEX: 26.6 KG/M2 | HEART RATE: 67 BPM | SYSTOLIC BLOOD PRESSURE: 124 MMHG | HEIGHT: 71 IN | WEIGHT: 190 LBS

## 2018-06-14 DIAGNOSIS — Z30.09 UNWANTED FERTILITY: Primary | ICD-10-CM

## 2018-06-14 PROCEDURE — 99499 UNLISTED E&M SERVICE: CPT | Mod: S$PBB,,, | Performed by: UROLOGY

## 2018-06-14 PROCEDURE — 55250 REMOVAL OF SPERM DUCT(S): CPT | Mod: S$PBB,,, | Performed by: UROLOGY

## 2018-06-14 PROCEDURE — 88302 TISSUE EXAM BY PATHOLOGIST: CPT | Mod: 26,,, | Performed by: PATHOLOGY

## 2018-06-14 PROCEDURE — 55250 REMOVAL OF SPERM DUCT(S): CPT | Mod: PBBFAC | Performed by: UROLOGY

## 2018-06-14 PROCEDURE — 88302 TISSUE EXAM BY PATHOLOGIST: CPT | Mod: 59 | Performed by: PATHOLOGY

## 2018-06-14 RX ORDER — HYDROCODONE BITARTRATE AND ACETAMINOPHEN 7.5; 325 MG/1; MG/1
1 TABLET ORAL EVERY 6 HOURS PRN
Qty: 20 TABLET | Refills: 0 | Status: SHIPPED | OUTPATIENT
Start: 2018-06-14 | End: 2018-06-24

## 2018-06-14 NOTE — PROCEDURES
Procedures   Procedure: Vasectomy    Procedure in Detail: Pt presented, and after proper consents were obtained, his pubic hair was clipped and his skin prepped and draped in normal sterile fashion in the supine position for vasectomy. The right vas was isolated and 1% lidocaine was instilled in the skin over the vas. The skin was opened sharply, and more local was applied into the vas. Ring forceps were used to isolate the vas and bring it out of the wound. The sheath overlying the vas was dissected away, and a length of vas exposed. A 1 cm segment of vas was excised, and each end was bovie'd and tied over itself using 4-0 chromic. After irrigation the skin was closed using 4-0 chromic after assuring excellent hemostasis. This was repeated on the left side. The left and right vas segments were sent to pathology for examination. The patient tolerated this well and was discharged home in stable condition.    Blood loss: scant  Blood given: none  UOP: none  Drains: none  Fluids: none  Path: as above    Follow Up: Pt given instructions for care at home, and all his questions were answered. RTC 3 months for semen analysis. Call or visit sooner if any difficulties arise.

## 2018-09-11 ENCOUNTER — CLINICAL SUPPORT (OUTPATIENT)
Dept: UROLOGY | Facility: CLINIC | Age: 38
End: 2018-09-11
Payer: OTHER GOVERNMENT

## 2018-09-11 VITALS — HEIGHT: 71 IN | WEIGHT: 190 LBS | BODY MASS INDEX: 26.6 KG/M2

## 2018-09-11 DIAGNOSIS — Z30.09 UNWANTED FERTILITY: Primary | ICD-10-CM

## 2018-09-11 PROCEDURE — 99999 PR PBB SHADOW E&M-EST. PATIENT-LVL II: CPT | Mod: PBBFAC,,,

## 2018-09-11 PROCEDURE — 99212 OFFICE O/P EST SF 10 MIN: CPT | Mod: PBBFAC

## 2018-09-11 NOTE — PROGRESS NOTES
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 Azoospermia. Follow up in 1 month for final check.

## 2018-10-16 ENCOUNTER — CLINICAL SUPPORT (OUTPATIENT)
Dept: UROLOGY | Facility: CLINIC | Age: 38
End: 2018-10-16
Payer: OTHER GOVERNMENT

## 2019-02-09 ENCOUNTER — PATIENT MESSAGE (OUTPATIENT)
Dept: INTERNAL MEDICINE | Facility: CLINIC | Age: 39
End: 2019-02-09

## 2019-02-11 RX ORDER — ESOMEPRAZOLE MAGNESIUM 40 MG/1
40 CAPSULE, DELAYED RELEASE ORAL DAILY
Qty: 90 CAPSULE | Refills: 3 | Status: SHIPPED | OUTPATIENT
Start: 2019-02-11 | End: 2020-01-27

## 2019-05-06 ENCOUNTER — OFFICE VISIT (OUTPATIENT)
Dept: INTERNAL MEDICINE | Facility: CLINIC | Age: 39
End: 2019-05-06
Payer: OTHER GOVERNMENT

## 2019-05-06 ENCOUNTER — LAB VISIT (OUTPATIENT)
Dept: LAB | Facility: HOSPITAL | Age: 39
End: 2019-05-06
Attending: FAMILY MEDICINE
Payer: OTHER GOVERNMENT

## 2019-05-06 VITALS
BODY MASS INDEX: 25.65 KG/M2 | WEIGHT: 183.19 LBS | HEIGHT: 71 IN | TEMPERATURE: 97 F | DIASTOLIC BLOOD PRESSURE: 62 MMHG | SYSTOLIC BLOOD PRESSURE: 114 MMHG

## 2019-05-06 DIAGNOSIS — Z00.00 ROUTINE GENERAL MEDICAL EXAMINATION AT A HEALTH CARE FACILITY: ICD-10-CM

## 2019-05-06 DIAGNOSIS — K21.9 GASTROESOPHAGEAL REFLUX DISEASE, ESOPHAGITIS PRESENCE NOT SPECIFIED: ICD-10-CM

## 2019-05-06 DIAGNOSIS — J30.2 SEASONAL ALLERGIES: ICD-10-CM

## 2019-05-06 DIAGNOSIS — Z00.00 ROUTINE GENERAL MEDICAL EXAMINATION AT A HEALTH CARE FACILITY: Primary | ICD-10-CM

## 2019-05-06 LAB
ALBUMIN SERPL BCP-MCNC: 4.2 G/DL (ref 3.5–5.2)
ALP SERPL-CCNC: 55 U/L (ref 55–135)
ALT SERPL W/O P-5'-P-CCNC: 16 U/L (ref 10–44)
ANION GAP SERPL CALC-SCNC: 10 MMOL/L (ref 8–16)
AST SERPL-CCNC: 16 U/L (ref 10–40)
BASOPHILS # BLD AUTO: 0.02 K/UL (ref 0–0.2)
BASOPHILS NFR BLD: 0.4 % (ref 0–1.9)
BILIRUB SERPL-MCNC: 0.8 MG/DL (ref 0.1–1)
BUN SERPL-MCNC: 13 MG/DL (ref 6–20)
CALCIUM SERPL-MCNC: 9.8 MG/DL (ref 8.7–10.5)
CHLORIDE SERPL-SCNC: 108 MMOL/L (ref 95–110)
CHOLEST SERPL-MCNC: 159 MG/DL (ref 120–199)
CHOLEST/HDLC SERPL: 4 {RATIO} (ref 2–5)
CO2 SERPL-SCNC: 25 MMOL/L (ref 23–29)
CREAT SERPL-MCNC: 0.8 MG/DL (ref 0.5–1.4)
DIFFERENTIAL METHOD: ABNORMAL
EOSINOPHIL # BLD AUTO: 0.1 K/UL (ref 0–0.5)
EOSINOPHIL NFR BLD: 1.9 % (ref 0–8)
ERYTHROCYTE [DISTWIDTH] IN BLOOD BY AUTOMATED COUNT: 12.6 % (ref 11.5–14.5)
EST. GFR  (AFRICAN AMERICAN): >60 ML/MIN/1.73 M^2
EST. GFR  (NON AFRICAN AMERICAN): >60 ML/MIN/1.73 M^2
ESTIMATED AVG GLUCOSE: 91 MG/DL (ref 68–131)
GLUCOSE SERPL-MCNC: 70 MG/DL (ref 70–110)
HBA1C MFR BLD HPLC: 4.8 % (ref 4–5.6)
HCT VFR BLD AUTO: 44 % (ref 40–54)
HDLC SERPL-MCNC: 40 MG/DL (ref 40–75)
HDLC SERPL: 25.2 % (ref 20–50)
HGB BLD-MCNC: 14.6 G/DL (ref 14–18)
IMM GRANULOCYTES # BLD AUTO: 0.02 K/UL (ref 0–0.04)
IMM GRANULOCYTES NFR BLD AUTO: 0.4 % (ref 0–0.5)
LDLC SERPL CALC-MCNC: 91.6 MG/DL (ref 63–159)
LYMPHOCYTES # BLD AUTO: 1.7 K/UL (ref 1–4.8)
LYMPHOCYTES NFR BLD: 35.5 % (ref 18–48)
MCH RBC QN AUTO: 29 PG (ref 27–31)
MCHC RBC AUTO-ENTMCNC: 33.2 G/DL (ref 32–36)
MCV RBC AUTO: 88 FL (ref 82–98)
MONOCYTES # BLD AUTO: 0.2 K/UL (ref 0.3–1)
MONOCYTES NFR BLD: 4.9 % (ref 4–15)
NEUTROPHILS # BLD AUTO: 2.7 K/UL (ref 1.8–7.7)
NEUTROPHILS NFR BLD: 56.9 % (ref 38–73)
NONHDLC SERPL-MCNC: 119 MG/DL
NRBC BLD-RTO: 0 /100 WBC
PLATELET # BLD AUTO: 165 K/UL (ref 150–350)
PMV BLD AUTO: 12 FL (ref 9.2–12.9)
POTASSIUM SERPL-SCNC: 4.4 MMOL/L (ref 3.5–5.1)
PROT SERPL-MCNC: 6.8 G/DL (ref 6–8.4)
RBC # BLD AUTO: 5.03 M/UL (ref 4.6–6.2)
SODIUM SERPL-SCNC: 143 MMOL/L (ref 136–145)
TRIGL SERPL-MCNC: 137 MG/DL (ref 30–150)
TSH SERPL DL<=0.005 MIU/L-ACNC: 1.75 UIU/ML (ref 0.4–4)
WBC # BLD AUTO: 4.73 K/UL (ref 3.9–12.7)

## 2019-05-06 PROCEDURE — 99395 PR PREVENTIVE VISIT,EST,18-39: ICD-10-PCS | Mod: S$PBB,,, | Performed by: FAMILY MEDICINE

## 2019-05-06 PROCEDURE — 99395 PREV VISIT EST AGE 18-39: CPT | Mod: S$PBB,,, | Performed by: FAMILY MEDICINE

## 2019-05-06 PROCEDURE — 99213 OFFICE O/P EST LOW 20 MIN: CPT | Mod: PBBFAC,PO | Performed by: FAMILY MEDICINE

## 2019-05-06 PROCEDURE — 85025 COMPLETE CBC W/AUTO DIFF WBC: CPT

## 2019-05-06 PROCEDURE — 99999 PR PBB SHADOW E&M-EST. PATIENT-LVL III: ICD-10-PCS | Mod: PBBFAC,,, | Performed by: FAMILY MEDICINE

## 2019-05-06 PROCEDURE — 99999 PR PBB SHADOW E&M-EST. PATIENT-LVL III: CPT | Mod: PBBFAC,,, | Performed by: FAMILY MEDICINE

## 2019-05-06 PROCEDURE — 83036 HEMOGLOBIN GLYCOSYLATED A1C: CPT

## 2019-05-06 PROCEDURE — 36415 COLL VENOUS BLD VENIPUNCTURE: CPT | Mod: PO

## 2019-05-06 PROCEDURE — 84443 ASSAY THYROID STIM HORMONE: CPT

## 2019-05-06 PROCEDURE — 80053 COMPREHEN METABOLIC PANEL: CPT

## 2019-05-06 PROCEDURE — 80061 LIPID PANEL: CPT

## 2019-05-06 NOTE — PROGRESS NOTES
Subjective:      Patient ID: Sacha Fregoso is a 38 y.o. male.    Chief Complaint: Annual Exam    Disclaimer:  This note is prepared using voice recognition software and as such is likely to have errors and has not been proof read. Please contact me for questions.     Sacha Fregoso is a 37 y.o. male who presents today for prevention exam.  He is doing quite well.  Still on Nexium daily for prolonged history of reflux.  Is a runner active .  No other issues at this time.  Is taking Zyrtec for seasonal allergies.    He has had a colonoscopy in the past back in 2007 had some substantial colon polyps that were hyperplastic however on repeat in 2013 it was normal.  He was discussed to resume back to normal colonoscopy in 10 year interval.    Has had elevated cholesterol the past with low HDL.  Father and mother both on cholesterol medicines.  Willing to do this lab work      Lab Results   Component Value Date    WBC 5.87 03/13/2018    HGB 15.0 03/13/2018    HCT 44.5 03/13/2018     03/13/2018    CHOL 167 03/13/2018    TRIG 137 03/13/2018    HDL 38 (L) 03/13/2018    ALT 33 03/13/2018    AST 23 03/13/2018     03/13/2018    K 4.0 03/13/2018     03/13/2018    CREATININE 0.9 03/13/2018    BUN 14 03/13/2018    CO2 26 03/13/2018    TSH 2.912 03/13/2018    HGBA1C 4.7 03/13/2018       No image results found.        Review of Systems   Constitutional: Negative for activity change, chills, fatigue and unexpected weight change.   HENT: Negative for congestion, ear pain, hearing loss, postnasal drip, rhinorrhea, sneezing, sore throat and trouble swallowing.    Eyes: Negative for pain, discharge and visual disturbance.   Respiratory: Negative for cough, chest tightness, shortness of breath and wheezing.    Cardiovascular: Negative for chest pain, palpitations and leg swelling.   Gastrointestinal: Negative for abdominal pain, blood in stool, constipation, diarrhea, nausea and vomiting.  "  Endocrine: Negative for cold intolerance, heat intolerance, polydipsia and polyuria.   Genitourinary: Negative for difficulty urinating, dysuria, flank pain, hematuria and urgency.   Musculoskeletal: Negative for arthralgias, back pain, joint swelling and neck pain.   Skin: Negative for color change and rash.   Neurological: Negative for dizziness, seizures, weakness and headaches.   Psychiatric/Behavioral: Negative for behavioral problems, confusion and dysphoric mood.     Objective:     Vitals:    05/06/19 0823   BP: 114/62   Temp: 97 °F (36.1 °C)   TempSrc: Tympanic   Weight: 83.1 kg (183 lb 3.2 oz)   Height: 5' 11" (1.803 m)     Physical Exam   Constitutional: He is oriented to person, place, and time. He appears well-developed and well-nourished. No distress.   HENT:   Head: Normocephalic and atraumatic.   Right Ear: External ear normal.   Left Ear: External ear normal.   Nose: Nose normal.   Mouth/Throat: Oropharynx is clear and moist.   Eyes: Pupils are equal, round, and reactive to light. EOM are normal.   Neck: Normal range of motion. Neck supple. No thyromegaly present.   Cardiovascular: Normal rate and regular rhythm. Exam reveals no gallop and no friction rub.   No murmur heard.  Pulmonary/Chest: Effort normal and breath sounds normal. No respiratory distress.   Abdominal: Soft. Bowel sounds are normal. He exhibits no distension. There is no tenderness. There is no rebound.   Musculoskeletal: Normal range of motion.   Lymphadenopathy:     He has no cervical adenopathy.   Neurological: He is alert and oriented to person, place, and time. Coordination normal.   Skin: Skin is warm and dry.   Psychiatric: He has a normal mood and affect.   Vitals reviewed.    Assessment:     1. Routine general medical examination at a health care facility    2. Gastroesophageal reflux disease, esophagitis presence not specified    3. Seasonal allergies      Plan:   Sacha was seen today for annual exam.    Diagnoses " and all orders for this visit:    Routine general medical examination at a health care facility - labs ordered. Discussed Health Maintenance issues.     -     TSH; Future  -     Lipid panel; Future  -     Hemoglobin A1c; Future  -     Comprehensive metabolic panel; Future  -     CBC auto differential; Future    Gastroesophageal reflux disease, esophagitis presence not specified - stable, Continue with current medications and interventions.       Seasonal allergies- - stable, labs ordered today.  Continue with current medications and interventions until labs are reviewed to make adjustments.       Other orders  -     Cancel: Microalbumin/creatinine urine ratio; Future            Follow up in about 1 year (around 5/6/2020) for physical with Dr AVENDANO.    There are no Patient Instructions on file for this visit.

## 2020-01-27 RX ORDER — ESOMEPRAZOLE MAGNESIUM 40 MG/1
CAPSULE, DELAYED RELEASE ORAL
Qty: 90 CAPSULE | Refills: 0 | Status: SHIPPED | OUTPATIENT
Start: 2020-01-27 | End: 2020-04-24

## 2020-04-24 RX ORDER — ESOMEPRAZOLE MAGNESIUM 40 MG/1
CAPSULE, DELAYED RELEASE ORAL
Qty: 90 CAPSULE | Refills: 0 | Status: SHIPPED | OUTPATIENT
Start: 2020-04-24 | End: 2020-05-04 | Stop reason: SDUPTHER

## 2020-04-24 NOTE — TELEPHONE ENCOUNTER
30 day no refills given, Please keep current appt or schedule followup appt with PCP for further refills .

## 2020-05-22 ENCOUNTER — PATIENT MESSAGE (OUTPATIENT)
Dept: INTERNAL MEDICINE | Facility: CLINIC | Age: 40
End: 2020-05-22

## 2020-05-22 RX ORDER — ESOMEPRAZOLE MAGNESIUM 40 MG/1
40 CAPSULE, DELAYED RELEASE ORAL DAILY
Qty: 90 CAPSULE | Refills: 0 | Status: SHIPPED | OUTPATIENT
Start: 2020-05-22 | End: 2020-06-15

## 2020-06-15 ENCOUNTER — OFFICE VISIT (OUTPATIENT)
Dept: INTERNAL MEDICINE | Facility: CLINIC | Age: 40
End: 2020-06-15
Payer: OTHER GOVERNMENT

## 2020-06-15 ENCOUNTER — LAB VISIT (OUTPATIENT)
Dept: LAB | Facility: HOSPITAL | Age: 40
End: 2020-06-15
Attending: FAMILY MEDICINE
Payer: OTHER GOVERNMENT

## 2020-06-15 VITALS
TEMPERATURE: 99 F | SYSTOLIC BLOOD PRESSURE: 110 MMHG | DIASTOLIC BLOOD PRESSURE: 80 MMHG | BODY MASS INDEX: 25.71 KG/M2 | HEIGHT: 71 IN | WEIGHT: 183.63 LBS | HEART RATE: 68 BPM

## 2020-06-15 DIAGNOSIS — M54.50 CHRONIC MIDLINE LOW BACK PAIN WITHOUT SCIATICA: ICD-10-CM

## 2020-06-15 DIAGNOSIS — M25.561 RECURRENT PAIN OF RIGHT KNEE: ICD-10-CM

## 2020-06-15 DIAGNOSIS — G89.29 CHRONIC MIDLINE LOW BACK PAIN WITHOUT SCIATICA: ICD-10-CM

## 2020-06-15 DIAGNOSIS — K21.9 GASTROESOPHAGEAL REFLUX DISEASE, ESOPHAGITIS PRESENCE NOT SPECIFIED: ICD-10-CM

## 2020-06-15 DIAGNOSIS — J30.2 SEASONAL ALLERGIES: ICD-10-CM

## 2020-06-15 DIAGNOSIS — Z00.00 ROUTINE GENERAL MEDICAL EXAMINATION AT A HEALTH CARE FACILITY: Primary | ICD-10-CM

## 2020-06-15 DIAGNOSIS — Z00.00 ROUTINE GENERAL MEDICAL EXAMINATION AT A HEALTH CARE FACILITY: ICD-10-CM

## 2020-06-15 LAB
ALBUMIN SERPL BCP-MCNC: 4.5 G/DL (ref 3.5–5.2)
ALP SERPL-CCNC: 61 U/L (ref 55–135)
ALT SERPL W/O P-5'-P-CCNC: 20 U/L (ref 10–44)
ANION GAP SERPL CALC-SCNC: 9 MMOL/L (ref 8–16)
AST SERPL-CCNC: 18 U/L (ref 10–40)
BASOPHILS # BLD AUTO: 0.03 K/UL (ref 0–0.2)
BASOPHILS NFR BLD: 0.6 % (ref 0–1.9)
BILIRUB SERPL-MCNC: 0.8 MG/DL (ref 0.1–1)
BUN SERPL-MCNC: 12 MG/DL (ref 6–20)
CALCIUM SERPL-MCNC: 9.5 MG/DL (ref 8.7–10.5)
CHLORIDE SERPL-SCNC: 103 MMOL/L (ref 95–110)
CHOLEST SERPL-MCNC: 189 MG/DL (ref 120–199)
CHOLEST/HDLC SERPL: 3.7 {RATIO} (ref 2–5)
CO2 SERPL-SCNC: 27 MMOL/L (ref 23–29)
CREAT SERPL-MCNC: 0.9 MG/DL (ref 0.5–1.4)
DIFFERENTIAL METHOD: ABNORMAL
EOSINOPHIL # BLD AUTO: 0.1 K/UL (ref 0–0.5)
EOSINOPHIL NFR BLD: 1.6 % (ref 0–8)
ERYTHROCYTE [DISTWIDTH] IN BLOOD BY AUTOMATED COUNT: 12.6 % (ref 11.5–14.5)
EST. GFR  (AFRICAN AMERICAN): >60 ML/MIN/1.73 M^2
EST. GFR  (NON AFRICAN AMERICAN): >60 ML/MIN/1.73 M^2
GLUCOSE SERPL-MCNC: 78 MG/DL (ref 70–110)
HCT VFR BLD AUTO: 48.5 % (ref 40–54)
HDLC SERPL-MCNC: 51 MG/DL (ref 40–75)
HDLC SERPL: 27 % (ref 20–50)
HGB BLD-MCNC: 15.3 G/DL (ref 14–18)
IMM GRANULOCYTES # BLD AUTO: 0.01 K/UL (ref 0–0.04)
IMM GRANULOCYTES NFR BLD AUTO: 0.2 % (ref 0–0.5)
LDLC SERPL CALC-MCNC: 110.8 MG/DL (ref 63–159)
LYMPHOCYTES # BLD AUTO: 2 K/UL (ref 1–4.8)
LYMPHOCYTES NFR BLD: 39.2 % (ref 18–48)
MCH RBC QN AUTO: 28.8 PG (ref 27–31)
MCHC RBC AUTO-ENTMCNC: 31.5 G/DL (ref 32–36)
MCV RBC AUTO: 91 FL (ref 82–98)
MONOCYTES # BLD AUTO: 0.3 K/UL (ref 0.3–1)
MONOCYTES NFR BLD: 5.6 % (ref 4–15)
NEUTROPHILS # BLD AUTO: 2.6 K/UL (ref 1.8–7.7)
NEUTROPHILS NFR BLD: 52.8 % (ref 38–73)
NONHDLC SERPL-MCNC: 138 MG/DL
NRBC BLD-RTO: 0 /100 WBC
PLATELET # BLD AUTO: 195 K/UL (ref 150–350)
PMV BLD AUTO: 12.1 FL (ref 9.2–12.9)
POTASSIUM SERPL-SCNC: 4.5 MMOL/L (ref 3.5–5.1)
PROT SERPL-MCNC: 7.2 G/DL (ref 6–8.4)
RBC # BLD AUTO: 5.31 M/UL (ref 4.6–6.2)
SODIUM SERPL-SCNC: 139 MMOL/L (ref 136–145)
TRIGL SERPL-MCNC: 136 MG/DL (ref 30–150)
TSH SERPL DL<=0.005 MIU/L-ACNC: 2.59 UIU/ML (ref 0.4–4)
WBC # BLD AUTO: 5 K/UL (ref 3.9–12.7)

## 2020-06-15 PROCEDURE — 99214 OFFICE O/P EST MOD 30 MIN: CPT | Mod: PBBFAC,PO | Performed by: FAMILY MEDICINE

## 2020-06-15 PROCEDURE — 99395 PREV VISIT EST AGE 18-39: CPT | Mod: S$PBB,,, | Performed by: FAMILY MEDICINE

## 2020-06-15 PROCEDURE — 83036 HEMOGLOBIN GLYCOSYLATED A1C: CPT

## 2020-06-15 PROCEDURE — 84443 ASSAY THYROID STIM HORMONE: CPT

## 2020-06-15 PROCEDURE — 80053 COMPREHEN METABOLIC PANEL: CPT

## 2020-06-15 PROCEDURE — 99395 PR PREVENTIVE VISIT,EST,18-39: ICD-10-PCS | Mod: S$PBB,,, | Performed by: FAMILY MEDICINE

## 2020-06-15 PROCEDURE — 99999 PR PBB SHADOW E&M-EST. PATIENT-LVL IV: CPT | Mod: PBBFAC,,, | Performed by: FAMILY MEDICINE

## 2020-06-15 PROCEDURE — 99999 PR PBB SHADOW E&M-EST. PATIENT-LVL IV: ICD-10-PCS | Mod: PBBFAC,,, | Performed by: FAMILY MEDICINE

## 2020-06-15 PROCEDURE — 36415 COLL VENOUS BLD VENIPUNCTURE: CPT | Mod: PO

## 2020-06-15 PROCEDURE — 80061 LIPID PANEL: CPT

## 2020-06-15 PROCEDURE — 85025 COMPLETE CBC W/AUTO DIFF WBC: CPT

## 2020-06-15 RX ORDER — MONTELUKAST SODIUM 10 MG/1
10 TABLET ORAL NIGHTLY
Qty: 90 TABLET | Refills: 3 | Status: SHIPPED | OUTPATIENT
Start: 2020-06-15 | End: 2020-07-15

## 2020-06-15 RX ORDER — FAMOTIDINE 20 MG/1
20 TABLET, FILM COATED ORAL 2 TIMES DAILY
Qty: 180 TABLET | Refills: 3 | Status: SHIPPED | OUTPATIENT
Start: 2020-06-15 | End: 2021-09-13 | Stop reason: SDUPTHER

## 2020-06-15 NOTE — PROGRESS NOTES
Subjective:      Patient ID: Sacha Fregoso is a 39 y.o. male.    Chief Complaint: Annual Exam    Disclaimer:  This note is prepared using voice recognition software and as such is likely to have errors and has not been proof read. Please contact me for questions.     Sacha Fregoso is a 39 y.o. male who presents today for prevention exam.  He is doing quite well.  Having some seasonal allergies. Tried meds before but didn't really work as well as expected.  was taking Zyrtec for seasonal allergies.. willing to try singulair next.     Still on Nexium daily for prolonged history of reflux. Wife wants to discuss about changing meds because being on it since 2007.  Willing to try pepcid because of concerns for long term use with ppi and ckd.     Is a runner active .  No other issues at this time.     Feels that knees hurt at times.  Feels like getting some arthritis in the right than the left. Once running is fine. Will have back pain also at night, midline, wakes him up. Will urinate and then will go back to sleep. No trauma. Mattress is 6 + yrs old. Normally back sleep position.       He has had a colonoscopy in the past back in 2007 had some substantial colon polyps that were hyperplastic however on repeat in 2013 it was normal.  He was discussed to resume back to normal colonoscopy in 10 year interval.    Has had elevated cholesterol the past with low HDL.  Father and mother both on cholesterol medicines.  Willing to do this lab work      Lab Results   Component Value Date    WBC 4.73 05/06/2019    HGB 14.6 05/06/2019    HCT 44.0 05/06/2019     05/06/2019    CHOL 159 05/06/2019    TRIG 137 05/06/2019    HDL 40 05/06/2019    ALT 16 05/06/2019    AST 16 05/06/2019     05/06/2019    K 4.4 05/06/2019     05/06/2019    CREATININE 0.8 05/06/2019    BUN 13 05/06/2019    CO2 25 05/06/2019    TSH 1.746 05/06/2019    HGBA1C 4.8 05/06/2019       No image results found.        Review of  "Systems   Constitutional: Negative for activity change and unexpected weight change.   HENT: Positive for rhinorrhea. Negative for hearing loss and trouble swallowing.    Eyes: Negative for discharge and visual disturbance.   Respiratory: Negative for chest tightness and wheezing.    Cardiovascular: Negative for chest pain and palpitations.   Gastrointestinal: Negative for blood in stool, constipation, diarrhea and vomiting.   Endocrine: Negative for polydipsia and polyuria.   Genitourinary: Negative for difficulty urinating, hematuria and urgency.   Musculoskeletal: Positive for arthralgias and back pain. Negative for joint swelling and neck pain.   Neurological: Negative for weakness and headaches.   Psychiatric/Behavioral: Positive for sleep disturbance. Negative for confusion and dysphoric mood.     Objective:     Vitals:    06/15/20 0817   BP: 110/80   Pulse: 68   Temp: 98.8 °F (37.1 °C)   Weight: 83.3 kg (183 lb 10.3 oz)   Height: 5' 11" (1.803 m)     Physical Exam  Vitals signs reviewed.   Constitutional:       General: He is not in acute distress.     Appearance: Normal appearance. He is well-developed and normal weight.   HENT:      Head: Normocephalic and atraumatic.      Right Ear: Tympanic membrane and external ear normal.      Left Ear: Tympanic membrane and external ear normal.      Nose: Nose normal.   Eyes:      Pupils: Pupils are equal, round, and reactive to light.   Neck:      Musculoskeletal: Normal range of motion and neck supple.      Thyroid: No thyromegaly.   Cardiovascular:      Rate and Rhythm: Normal rate and regular rhythm.      Heart sounds: No murmur. No friction rub. No gallop.    Pulmonary:      Effort: Pulmonary effort is normal. No respiratory distress.      Breath sounds: Normal breath sounds.   Abdominal:      General: Bowel sounds are normal. There is no distension.      Palpations: Abdomen is soft.      Tenderness: There is no abdominal tenderness. There is no rebound. "   Musculoskeletal: Normal range of motion.         General: No tenderness.   Lymphadenopathy:      Cervical: No cervical adenopathy.   Skin:     General: Skin is warm and dry.   Neurological:      General: No focal deficit present.      Mental Status: He is alert and oriented to person, place, and time.      Coordination: Coordination normal.   Psychiatric:         Mood and Affect: Mood normal.         Behavior: Behavior normal.         Thought Content: Thought content normal.         Judgment: Judgment normal.       Assessment:     1. Routine general medical examination at a health care facility    2. Seasonal allergies    3. Recurrent pain of right knee    4. Gastroesophageal reflux disease, esophagitis presence not specified    5. Chronic midline low back pain without sciatica      Plan:   Sacha was seen today for annual exam.    Diagnoses and all orders for this visit:    Routine general medical examination at a health care facility - labs ordered. Discussed Health Maintenance issues.     -     TSH; Future  -     Lipid Panel; Future  -     Hemoglobin A1C; Future  -     Comprehensive metabolic panel; Future  -     CBC auto differential; Future  -     Urinalysis; Future    Seasonal allergies- trial of Singulair    Recurrent pain of right knee-refer to Dr. Bocanegra to evaluate as a runner and also with back issues as well  -     Ambulatory referral/consult to Orthopedics; Future    Gastroesophageal reflux disease, esophagitis presence not specified desires to change from PPI to trying H2 blocker  -     famotidine (PEPCID) 20 MG tablet; Take 1 tablet (20 mg total) by mouth 2 (two) times daily.    Chronic midline low back pain without sciatica-will refer to Dr. Bocanegra advised on working on adjusting mattress looking for back support if not improve may be referred to orthopedics to identify additional issues no red flags today  -     Cancel: Urinalysis    Other orders  -     montelukast (SINGULAIR) 10 mg tablet; Take 1  tablet (10 mg total) by mouth every evening.            Follow up in about 1 year (around 6/15/2021) for physical with Dr AVENDANO.    There are no Patient Instructions on file for this visit.

## 2020-06-16 LAB
ESTIMATED AVG GLUCOSE: 91 MG/DL (ref 68–131)
HBA1C MFR BLD HPLC: 4.8 % (ref 4–5.6)

## 2020-06-23 ENCOUNTER — TELEPHONE (OUTPATIENT)
Dept: ORTHOPEDICS | Facility: CLINIC | Age: 40
End: 2020-06-23

## 2020-06-23 NOTE — TELEPHONE ENCOUNTER
Left message for pt to call back regarding rescheduling his apt to another day due to provider being out office,.    Telephone encounter created and sent to Triage.

## 2020-07-21 ENCOUNTER — TELEPHONE (OUTPATIENT)
Dept: ORTHOPEDICS | Facility: CLINIC | Age: 40
End: 2020-07-21

## 2020-07-22 ENCOUNTER — TELEPHONE (OUTPATIENT)
Dept: ORTHOPEDICS | Facility: CLINIC | Age: 40
End: 2020-07-22

## 2020-07-23 ENCOUNTER — TELEPHONE (OUTPATIENT)
Dept: ORTHOPEDICS | Facility: CLINIC | Age: 40
End: 2020-07-23

## 2020-08-10 ENCOUNTER — HOSPITAL ENCOUNTER (OUTPATIENT)
Dept: RADIOLOGY | Facility: HOSPITAL | Age: 40
Discharge: HOME | End: 2020-08-10
Attending: PHYSICAL MEDICINE & REHABILITATION
Payer: OTHER GOVERNMENT

## 2020-08-10 ENCOUNTER — OFFICE VISIT (OUTPATIENT)
Dept: ORTHOPEDICS | Facility: CLINIC | Age: 40
End: 2020-08-10
Payer: OTHER GOVERNMENT

## 2020-08-10 VITALS
HEART RATE: 65 BPM | WEIGHT: 183 LBS | DIASTOLIC BLOOD PRESSURE: 76 MMHG | HEIGHT: 71 IN | BODY MASS INDEX: 25.62 KG/M2 | SYSTOLIC BLOOD PRESSURE: 124 MMHG

## 2020-08-10 DIAGNOSIS — R26.9 GAIT ABNORMALITY: ICD-10-CM

## 2020-08-10 DIAGNOSIS — M76.31 ILIOTIBIAL BAND SYNDROME OF RIGHT SIDE: Primary | ICD-10-CM

## 2020-08-10 DIAGNOSIS — M76.31 ILIOTIBIAL BAND SYNDROME OF RIGHT SIDE: ICD-10-CM

## 2020-08-10 DIAGNOSIS — M25.561 RECURRENT PAIN OF RIGHT KNEE: ICD-10-CM

## 2020-08-10 PROCEDURE — 73562 X-RAY EXAM OF KNEE 3: CPT | Mod: TC,LT

## 2020-08-10 PROCEDURE — 99204 PR OFFICE/OUTPT VISIT, NEW, LEVL IV, 45-59 MIN: ICD-10-PCS | Mod: S$PBB,,, | Performed by: PHYSICAL MEDICINE & REHABILITATION

## 2020-08-10 PROCEDURE — 99214 OFFICE O/P EST MOD 30 MIN: CPT | Mod: PBBFAC,25 | Performed by: PHYSICAL MEDICINE & REHABILITATION

## 2020-08-10 PROCEDURE — 73562 XR KNEE ORTHO RIGHT WITH FLEXION: ICD-10-PCS | Mod: 26,LT,, | Performed by: RADIOLOGY

## 2020-08-10 PROCEDURE — 99999 PR PBB SHADOW E&M-EST. PATIENT-LVL IV: CPT | Mod: PBBFAC,,, | Performed by: PHYSICAL MEDICINE & REHABILITATION

## 2020-08-10 PROCEDURE — 73564 XR KNEE ORTHO RIGHT WITH FLEXION: ICD-10-PCS | Mod: 26,RT,, | Performed by: RADIOLOGY

## 2020-08-10 PROCEDURE — 73564 X-RAY EXAM KNEE 4 OR MORE: CPT | Mod: 26,RT,, | Performed by: RADIOLOGY

## 2020-08-10 PROCEDURE — 99999 PR PBB SHADOW E&M-EST. PATIENT-LVL IV: ICD-10-PCS | Mod: PBBFAC,,, | Performed by: PHYSICAL MEDICINE & REHABILITATION

## 2020-08-10 PROCEDURE — 73562 X-RAY EXAM OF KNEE 3: CPT | Mod: 26,LT,, | Performed by: RADIOLOGY

## 2020-08-10 PROCEDURE — 99204 OFFICE O/P NEW MOD 45 MIN: CPT | Mod: S$PBB,,, | Performed by: PHYSICAL MEDICINE & REHABILITATION

## 2020-08-10 NOTE — LETTER
August 10, 2020      Zuri Knowles MD  [address] [facility] [address]           [facility]  [address] [address] [address]  Phone: [phone]  Fax: [phone]          Patient: Sacha Fregoso   MR Number: [id number]   YOB: 1980   Date of Visit: 8/10/2020       Dear Dr. Zuri Knowles:    Thank you for referring Sacha Fregoso to me for evaluation. Attached you will find relevant portions of my assessment and plan of care.    If you have questions, please do not hesitate to call me. I look forward to following Sacha Fregoso along with you.    Sincerely,    Mary Bocanegra MD    Enclosure  CC:  No Recipients    If you would like to receive this communication electronically, please contact [email or web address] or [phone] to request more information on [facility] access.    For providers and/or their staff who would like to refer a patient to [facility], please contact us through our one-stop-shop provider referral line, [facility] , at [phone].    If you feel you have received this communication in error or would no longer like to receive these types of communications, please e-mail [email or web address]

## 2020-08-10 NOTE — PATIENT INSTRUCTIONS
"Cho-Marla Dual Action Knee Strap      Activity Modification Guidelines    1. Ok to Run/Play/Exercise with mild pain, no more than 3 out of 10 on pain scale, but need to stop activity if pain is moderate or 4 out of 10 and above.    2. Caveat - if pain with activity starts at worse than 3/10, but decreases within a few minutes, it's OK to push through.     3. No Running/Lifting if limping/changing gait/changing lifting form.    4. Advance mileage/weights by no more than 10% per week. Long run mileage shouldn't be more than about 30% total weekly mileage.       Mobilization:     Its generally a good idea to assess yourself for hotspots once a month by briefly mobilizing everything with a  or foam roller, and then spend several minutes every other day of the week, for 4 - 6 weeks working on those painful spots using thumbs, foam roll, rolling pins, lacrosse ball, etc. If it hurts, then you need to do it. Once it no longer hurts, take a break on that area for a few months.     Quadriceps Mobilization:    Spend about 30 - 60 seconds on the whole quad, hitting the outside, middle, and inside portions.      IT Band Mobilization: Using 'the Stick', a foam roller, or lacross ball, roll all aspects of the IT band, but not over bony areas, for a minute or two every other day for about 6 weeks. You can also try pausing over a painful area and then flexing and extending the knee for 20 seconds.     From "Anatomy for Runners" by NELIDA Mayorga      Working through these progressions:    Unless otherwise stated, you should only be doing one exercise from each progression listed below. These are listed in order of difficulty, so start with the first one in each list. Do as many reps as you can while maintaining excellent form. Stop doing the exercise if you fatigue or can't maintain proper form. Once you can do the recommended amount of reps for that exercise, stop doing that one and move on to the next exercise in that " progression list for your next workout. Try for 2 - 3 workouts per week.    Bridge Progression: The purpose is to practice pushing the leg back using the glutes while maintaining a neutral spine. Start with your foot close enough to touch the heel. Brace your core without letting your back arch or flatten, or rotate for single leg exercises. Squeeze the glutes and drive down through the heel. If you feel tightness in the low back, you need to either brace the core more, use the glutes more, or don't lift the hips quite as high. Arms down is easier, arms up is harder.     1. Double leg bridge with arms up: 3 x 10 - 20        2. Bridge march with arms up: 3 x 20 Don't allow the hips to rotate.        3. Single leg bridge arms up: 3 x 10 - 15 each leg         Hip Abduction Progression:      Lateral control - This is one of the most common problems with runners and can contribute to many different injuries and wasted energy. Improving hip strength will help significantly. As your hips get stronger, think about actively squeezing your glutes when you run - that will help get these muscles firing. Do only 1 of these exercises per workout and advance to the next exercise once you can do the recommended amount of reps.     1. Clam shells: 3 x 30  Stay perpendicular to ground, knees bent 90 degrees, feet lined up even with low back. Brace core & squeeze glutes throughout the exercise. Lift top knee only as far as you can without rolling backwards.           2. Side lying leg lift (keep leg slightly extended): 3 x 15      3. Seven way hips: Start with 3 x 2 - 3 reps and progress to 3 x 8 - 10 reps. Follow this link:  https://www.Mindscore.com/watch?v=YdenOdoz-MI      4. Standing hip hikes: 3 x 20  (bonus points: hold core tight, good posture with weight on forefoot, and toe yoga)        Bird-dog Progression: Excellent for working on moving limbs while maintaining spine neutral position. Don't allow the back to arch, flex, or  rotate while doing these. You can place a dowel idalia or foam roller across the low back to let you know if you are rolling side to side.     1. Quadruped, Arms Only: 3 x 8 - 12        2. Quadruped, Legs Only: 3 x 8 - 12      3. Quadruped, Alternating Arm & Leg: 3 x 10 - 12 (Keep core tight, dont let back arch or roll side to side)              4. Donkey Kicks: 3 x 15 (same cues as above)      5. Fire Hydrants: 3 x 15      6. Neal Dogs: 3 x 12  The leg kick back is similar to the first exercise in the series, but rather than kicking straight back, youll kick about 45 degrees out and back and also externally rotate the foot out (toes point away from the body). For video: https://www.Ippies.com/watch?v=9P9qN3o5TJ5         Citizen of [place] Deadlift practice with stick: 10 - 20 reps, enough sets to get it right.    The goal is to the stick contacting the back evenly throughout and prevent rounding the spine on the way down or over-arching it on the way up. Hinge at the hips, keep glutes squeezed throughout the movement, allow 'soft knees' to relieve some tension from the hamstrings, but don't bend the knees nearly as much as squatting.      Citizen [place] Deadlifts: 2 - 4 sets of 3 - 8 reps      Single Leg Citizen [place] Deadlfits: 2 - 4 sets of 3 - 8 reps

## 2020-08-10 NOTE — PROGRESS NOTES
SPORTS MEDICINE / PM&R NEW PATIENT H & P:    Referring Physician: Zuri Knowles MD    Chief Complaint   Patient presents with    Right Knee - Pain       HPI: This is a 39 y.o.  male being seen in clinic today for evaluation of Pain of the Right Knee   He is [employer]. The problem first began in May with some inflammation and stiffness after runs. Would loosen up during runs. Around a month ago started having pain even at rest. He denies any specific injury or trauma. He feels dull, throbbing, intermittent, pain at rest and pain with movement pain on his right knee. Pain was mostly on lateral aspect but also anterior/medial knee. The symptoms show no change. He has tried icing his knee, rest and elevation without improvement. He has not tried physical therapy. He states that he has pain with running, pain and swelling after certain activities. He denies any popping or catching but he does mention that he has some instability when stepping backwards. No significant increase in running volume when this started, but had started doing some CrossFit type activities around the time this started. Did more lunges than usual. Lives near [facility].    History obtained from patient.    Past family, medical, social, surgical history, and vital signs reviewed in chart.    Review of Systems   Constitutional: Negative for chills, fever and weight loss.   HENT: Negative for hearing loss and sore throat.    Eyes: Negative for blurred vision, photophobia and pain.   Respiratory: Negative for shortness of breath.    Cardiovascular: Negative for chest pain.   Gastrointestinal: Negative for abdominal pain.   Genitourinary: Negative for dysuria.   Musculoskeletal: Positive for joint pain.   Skin: Negative for rash.   Neurological: Negative for tingling and headaches.   Endo/Heme/Allergies: Does not bruise/bleed easily.   Psychiatric/Behavioral: Negative for depression.       General    Nursing note and vitals  reviewed.  Constitutional: He is oriented to person, place, and time. He appears well-developed and well-nourished.   HENT:   Head: Normocephalic and atraumatic.   Eyes: Conjunctivae and EOM are normal. Pupils are equal, round, and reactive to light.   Neck: Neck supple.   Cardiovascular: Intact distal pulses.    Pulmonary/Chest: Effort normal. No respiratory distress.   Abdominal: He exhibits no distension.   Neurological: He is alert and oriented to person, place, and time. He has normal reflexes.   Psychiatric: He has a normal mood and affect.     General Musculoskeletal Exam   Gait: normal       Right Knee Exam     Inspection   Erythema: absent  Swelling: absent  Effusion: absent  Deformity: absent  Bruising: present (small medial bruise)    Tenderness   The patient is tender to palpation of the lateral joint line and iliotibial band.    Range of Motion   Extension: normal   Flexion:  110 abnormal     Tests   Meniscus   Jorge:  Medial - negative Lateral - negative  Ligament Examination Lachman: normal (-1 to 2mm) PCL-Posterior Drawer: normal (0 to 2mm)     MCL - Valgus: normal (0 to 2mm)  LCL - Varus: normal  Patella   Patellar apprehension: negative  Patellar Tracking: normal    Other   Muscle Tightness: quadriceps tightness  Sensation: normal    Comments:  Single Leg Squat Test:  R: knee adduction, trunk lean and loss of first ray contact  L: knee adduction, pelvic drop and foot touching down      Left Knee Exam   Left knee exam is normal.    Inspection   Erythema: absent  Swelling: absent  Effusion: absent    Range of Motion   The patient has normal left knee ROM.    Tests   Meniscus   Jorge:  Medial - negative Lateral - negative  Stability Lachman: normal (-1 to 2mm) PCL-Posterior Drawer: normal (0 to 2mm)  MCL - Valgus: normal (0 to 2mm)  LCL - Varus: normal (0 to 2mm)  Patella   Patellar apprehension: negative  Patellar Tracking: normal    Other   Sensation: normal    Right Hip Exam   Right hip exam  is normal.     Tests   Pain w/ forced internal rotation (MELVI): absent  Left Hip Exam   Left hip exam is normal.    Tests   Pain w/ forced internal rotation (MELVI): absent          Muscle Strength   Right Lower Extremity   Hip Abduction: 4/5   Hip Adduction: 5/5   Hip Flexion: 5/5   Quadriceps:  5/5   Hamstrin/5   Left Lower Extremity   Hip Abduction: 4/5   Hip Adduction: 5/5   Hip Flexion: 5/5   Quadriceps:  5/5   Hamstrin/5     Reflexes     Left Side  Quadriceps:  2+  Achilles:  2+    Right Side   Quadriceps:  2+  Achilles:  2+    Vascular Exam     Right Pulses  Dorsalis Pedis:      2+          Left Pulses  Dorsalis Pedis:      2+              IMPRESSION/PLAN: Sacha is a 39 y.o.  male with:    1. Recurrent pain of right knee  - Ambulatory referral/consult to Orthopedics    2. Iliotibial band syndrome of right side  - Ambulatory referral/consult to Physical/Occupational Therapy; Future    3. Gait abnormality       The findings were discussed with Sacha in detail. I'm a little concerned about his pain with flexion, but otherwise his symptoms are most consistent with overuse injury such as IT band or PFPS. We'll get x-rays today in case he isn't improving with conservative care. The more we talked, he thinks the increased lunging and even racing while lunging likely contributed to this. We discuss the biomechanics and knee control issues related to this. He was given an HEP along with RTR guidelines, and he'll come here to work with our PTs on this. He can try a Cho-Pat brace for exercise as well. All of his questions were answered. He was provided this plan in writing. He will follow-up with me in 4 - 6 weeks.     Mary Bocanegra M.D.  Sports Medicine

## 2020-08-17 ENCOUNTER — CLINICAL SUPPORT (OUTPATIENT)
Dept: REHABILITATION | Facility: HOSPITAL | Age: 40
End: 2020-08-17
Payer: OTHER GOVERNMENT

## 2020-08-17 DIAGNOSIS — M76.31 ILIOTIBIAL BAND SYNDROME OF RIGHT SIDE: ICD-10-CM

## 2020-08-17 DIAGNOSIS — R29.898 RIGHT LEG WEAKNESS: Primary | ICD-10-CM

## 2020-08-17 PROCEDURE — 97161 PT EVAL LOW COMPLEX 20 MIN: CPT

## 2020-08-17 PROCEDURE — 97110 THERAPEUTIC EXERCISES: CPT

## 2020-08-17 NOTE — PLAN OF CARE
[facility]  Physical Therapy Initial Evaluation    Date: 8/17/2020   Name: Sacha Fregoso  Clinic Number: [id number]    Therapy Diagnosis:   Encounter Diagnoses   Name Primary?    Right leg weakness Yes    Iliotibial band syndrome of right side      Physician: Mary Bocanegra MD    Physician Orders: PT Eval and Treat   Medical Diagnosis from Referral: M76.31 (ICD-10-CM) - Iliotibial band syndrome of right side  Evaluation Date: 8/17/2020  Authorization Period Expiration: 12/9/2020  Plan of Care Expiration: 10/17/2020  Visit # / Visits authorized: 1/12    Time In: 9:05am  Time Out: 9:55am  Total Appointment Time (timed & untimed codes): 50 minutes    Precautions: Standard    Surgery: None    Subjective   Date of onset: May  History of current condition - Sacha reports: He has been running close to 30 miles a week. He states at the end of May he started having some stiffness in his right knee. He states he continued to run and after he warmed up it got better. He states it seems that lately the pain has started to increase and now it remains through the whole run. He states every once in a while it does bother him if he sits with his knee bent for to long. He also finds it will give way when he steps backward. He states it doesn't really bother him with every day stuff other than running. He states he loves to run and that is what he really wants to do. He states the pain stays mostly in the knee but he does occassionally have some tightness in the top of his calf. He states at this time he is down to around 4 miles a week running and he has been trying to bike and do the elliptical to still get his miles in. He states he works out every day in the morning before work.      Medical History:   Past Medical History:   Diagnosis Date    Colon polyp 2008 2013 clear resume at 10 year interval.     GERD (gastroesophageal reflux disease)     Seasonal allergies        Surgical  History:   Sacha Fregoso  has a past surgical history that includes wisdoom teeth; Colonoscopy; and Vasectomy.    Medications:   Sacha has a current medication list which includes the following prescription(s): famotidine.    Allergies:   Review of patient's allergies indicates:  No Known Allergies     Imaging, x-ray: There is no radiographic evidence of acute osseous, articular, or soft tissue abnormality.  Small tricompartmental marginal osteophytes are present on the right with preserved joint spaces.  Similar findings noted involving the left knee.    Prior Therapy: none  Social History: lives with their family and lives with their spouse  Occupation: , desk job   Prior Level of Function: running 30 miles a week  Current Level of Function: running 4 miles a week with pain    Pain:  Current 1/10, worst 5/10, best 0/10   Location: right knee   Description: Aching and Tight  Aggravating Factors: Running  Easing Factors: ice and rest    Pts goals: decrease pain and return to at least half marathon or full marathon for January.     Objective     Sensation:  Sensation is intact to light touch    (WFL: Within Functional Limits)     ROM   Right (degrees) Left (degrees)   Knee Flexion (140) 135 tight 140   Knee Extension (0) 0 0   Hip Flexion (125) WFL WFL   Hip Extension (15) WFL WFL   Hip Internal Rotation (45) WFL WFL   Hip External Rotation (45) WFL WFL   Hip Abduction (45) WFL WFL   Plantar Flexion (50) WFL WFL   Dorsiflexion (20) WFL WFL   Ankle Inversion (35) WFL WFL   Ankle Eversion (25) WFL WFL     Strength   Right    Left   Gluteus Minh 4+/5 4+/5   Gluteus Medius 4/5 4/5   Psoas 5/5 5/5   Quadriceps 4+/5 5/5   Hamstrings 4+/5 5/5   Anterior Tibialis 5/5 5/5   Gastroc/Soleus 4+/5 5/5   Transverse Abdominis 4-/5 4-/5     Special Tests:   Test Right Left   Rigo Test positive positive   Janusz Test negative negative   Valgus Stress Test negative negative   Varus Stress Test negative  negative   Noble Compression Test negative negative     Muscle Length: normal hamstring length         Palpation: no tenderness     Movement Analysis:   Squat: Normal    Single Leg Step Down: Left: slight valgus Right: more valgus than left  Single Leg Balance: Normal on both sides     Gait Analysis: The patient ambulated with the use of none and presents with the following gait abnormalities: none    Function:     Other: Pt presents with postural abnormalities which include: ankle/foot pronation       CMS Impairment/Limitation/Restriction for FOTO Knee Survey    Therapist reviewed FOTO scores for Sacha Fregoso on 8/17/2020.   FOTO documents entered into [employer] - see Media section.    Limitation Score: 39%  Category: Mobility         TREATMENT   Treatment Time In: 9:40am  Treatment Time Out: 9:55am  Total Treatment time (time-based codes) separate from Evaluation: 15 minutes    Sacha received therapeutic exercises to develop strength, endurance, ROM, flexibility, posture and core stabilization for 15 minutes including:  Hip Flexor Stretch [id number]  Quad Stretch [id number]  Hip Flexor with Quad Stretch [id number]    Sahca received the following manual therapy techniques: Joint mobilizations, Myofacial release and Soft tissue Mobilization were applied to the: right knee for 0 minutes, including:    Sacha participated in neuromuscular re-education activities to improve: Balance, Coordination, Sense and Proprioception for 0 minutes. The following activities were included:    Home Exercises and Patient Education Provided    Education provided:   - hip flexor stretch and quad stretch    Written Home Exercises Provided: yes.  Exercises were reviewed and Sacha was able to demonstrate them prior to the end of the session.  Sacha demonstrated good  understanding of the education provided.     See EMR under Patient Instructions for exercises provided 8/17/2020.    Assessment   Sacha is a 39 y.o.  male referred to outpatient Physical Therapy with a medical diagnosis of M76.31 (ICD-10-CM) - Iliotibial band syndrome of right side. The patient presents with impairments which include decreased ROM, decreased strength, impaired coordination, postural abnormalities and decreased overall function.  These impairments are limiting patient's ability to run, sit for long periods, and workout. Pt prognosis is Excellent due to personal factors and co-morbidities listed below. Pt will benefit from skilled outpatient Physical Therapy to address the deficits stated above and in the chart below, provide pt/family education, and to maximize pt's level of independence.      Plan of care discussed with patient: Yes  Pt's spiritual, cultural and educational needs considered and patient is agreeable to the plan of care and goals as stated below:     Anticipated Barriers for therapy: none    Medical Necessity is demonstrated by the following  History  Co-morbidities and personal factors that may impact the plan of care Co-morbidities:   GERD    Personal Factors:   no deficits     low   Examination  Body Structures and Functions, activity limitations and participation restrictions that may impact the plan of care Body Regions:   lower extremities    Body Systems:    ROM  strength  gross coordinated movement  balance    Participation Restrictions:   Running    Activity limitations:   Learning and applying knowledge  no deficits    General Tasks and Commands  no deficits    Communication  no deficits    Mobility  Running    Self care  no deficits    Domestic Life  no deficits    Interactions/Relationships  no deficits    Life Areas  employment    Community and Social Life  recreation and leisure         low   Clinical Presentation stable and uncomplicated low   Decision Making/ Complexity Score: low     Goals:  Short Term Goals: 4 weeks   1. Patient will improve knee flexion to 140 degrees in order to sit at desk.  2. Patient will be  independent with HEP in order to further progress and return to maximal function.  3. Pain rating at Worst: 2/10 in order to progress towards increased independence with activity.    Long Term Goals: 8 weeks   1. Patient will improve quadriceps and hamstrings strength to 5/5 in order to run without pain.  2. Patient will improve glute med strength to 4+/5 in order to perform a step down with no knee valgus.  3. Patient will improve transverse abdominis strength to 4+/5 in order to workout.   5. Patient will self report improvement to 15% limitation on the FOTO Knee Survey.     Plan   Plan of care Certification: 8/17/2020 to 10/17/2020.    Outpatient Physical Therapy 2 times weekly for 8 weeks to include the following interventions: Gait Training, Manual Therapy, Moist Heat/ Ice, Neuromuscular Re-ed, Patient Education, Therapeutic Activites and Therapeutic Exercise.     Santy Urias, PT, DPT

## 2020-08-21 ENCOUNTER — CLINICAL SUPPORT (OUTPATIENT)
Dept: REHABILITATION | Facility: HOSPITAL | Age: 40
End: 2020-08-21
Payer: OTHER GOVERNMENT

## 2020-08-21 DIAGNOSIS — R29.898 RIGHT LEG WEAKNESS: ICD-10-CM

## 2020-08-21 PROCEDURE — 97112 NEUROMUSCULAR REEDUCATION: CPT

## 2020-08-21 PROCEDURE — 97110 THERAPEUTIC EXERCISES: CPT

## 2020-08-21 NOTE — PROGRESS NOTES
Physical Therapy Treatment Note     Name: Sacha Fregoso  Clinic Number: [id number]    Therapy Diagnosis:   Encounter Diagnosis   Name Primary?    Right leg weakness      Physician: Mary Bocanegra MD    Visit Date: 8/21/2020    Physician Orders: PT Eval and Treat   Medical Diagnosis from Referral: M76.31 (ICD-10-CM) - Iliotibial band syndrome of right side  Evaluation Date: 8/17/2020  Authorization Period Expiration: 12/9/2020  Plan of Care Expiration: 10/17/2020  Visit # / Visits authorized: 2/12    Time In: 9:30am  Time Out: 10:20am  Total Billable Time: 45 minutes    Precautions: Standard    Subjective     Pt reports: His hip is feeling alright today. He states he was able to do a couple exercises and stretches over the past week but he was out of town so he hasn't done it as much as he should.  He was compliant with home exercise program.  Response to previous treatment: good  Functional change: none    Pain:  Current 1/10, worst 5/10, best 0/10   Location: right knee   Description: Aching and Tight    Objective     Sacha received therapeutic exercises to develop strength, endurance, ROM, flexibility, posture and core stabilization for 30 minutes including:  Bike 5 minutes level 10 for muscle endurance   Hip Flexor Stretch [id number]  Quad Stretch [id number]  Hip Flexor with Quad Stretch [id number]  Single Leg [place] Deadlift 3x8 7.5#  Eccentric Hip Flexor 3x8 with yoga ball  Step Taps 3x10     Sacha received the following manual therapy techniques: Joint mobilizations, Myofacial release and Soft tissue Mobilization were applied to the: right knee for 0 minutes, including:     Sacha participated in neuromuscular re-education activities to improve: Balance, Coordination, Sense and Proprioception for 15 minutes. The following activities were included:  Hip 3-way 30x red band each way each leg  Bridges 3x10  Single Leg Balance on MOBO 2x30     See EMR under Patient Instructions for exercises  provided 8/17/2020.    Assessment     New exercises were added for strengthening and balance with coordination. Patient tolerated today's treatment very well. Patient educated on stretching and strengthening at home along with foam rolling to help decrease muscle tension.     Sacha is progressing well towards his goals.   Pt prognosis is Excellent.     Pt will continue to benefit from skilled outpatient physical therapy to address the deficits listed in the problem list box on initial evaluation, provide pt/family education and to maximize pt's level of independence in the home and community environment.     Pt's spiritual, cultural and educational needs considered and pt agreeable to plan of care and goals.     Anticipated barriers to physical therapy: none    Goals:  Short Term Goals: 4 weeks   1. Patient will improve knee flexion to 140 degrees in order to sit at desk.  2. Patient will be independent with HEP in order to further progress and return to maximal function.  3. Pain rating at Worst: 2/10 in order to progress towards increased independence with activity.     Long Term Goals: 8 weeks   1. Patient will improve quadriceps and hamstrings strength to 5/5 in order to run without pain.  2. Patient will improve glute med strength to 4+/5 in order to perform a step down with no knee valgus.  3. Patient will improve transverse abdominis strength to 4+/5 in order to workout.   5. Patient will self report improvement to 15% limitation on the FOTO Knee Survey.     Plan     Continue with physical therapy as planned.     Plan of care Certification: 8/17/2020 to 10/17/2020.     Initial: Outpatient Physical Therapy 2 times weekly for 8 weeks to include the following interventions: Gait Training, Manual Therapy, Moist Heat/ Ice, Neuromuscular Re-ed, Patient Education, Therapeutic Activites and Therapeutic Exercise.     Santy Urias, PT, DPT

## 2020-08-31 ENCOUNTER — CLINICAL SUPPORT (OUTPATIENT)
Dept: REHABILITATION | Facility: HOSPITAL | Age: 40
End: 2020-08-31
Payer: OTHER GOVERNMENT

## 2020-08-31 DIAGNOSIS — R29.898 RIGHT LEG WEAKNESS: ICD-10-CM

## 2020-08-31 PROCEDURE — 97112 NEUROMUSCULAR REEDUCATION: CPT | Mod: CQ

## 2020-08-31 PROCEDURE — 97110 THERAPEUTIC EXERCISES: CPT | Mod: CQ

## 2020-08-31 NOTE — PROGRESS NOTES
Physical Therapy Treatment Note     Name: Sacha Fregoso  Clinic Number: [id number]    Therapy Diagnosis:   Encounter Diagnosis   Name Primary?    Right leg weakness      Physician: Mary Bocanegra MD    Visit Date: 8/31/2020    Physician Orders: PT Eval and Treat   Medical Diagnosis from Referral: M76.31 (ICD-10-CM) - Iliotibial band syndrome of right side  Evaluation Date: 8/17/2020  Authorization Period Expiration: 12/9/2020  Plan of Care Expiration: 10/17/2020  Visit # / Visits authorized: 3/12    Time In: 11:00am  Time Out: 11:50am  Total Billable Time: 50 minutes    Precautions: Standard    Subjective     Pt reports: He had a setback this weekend. On Friday, he jumped up to a pull-up bar and felt increased pain in the right knee, comparable to the pain he felt before he started therapy. The pain lasted throughout the day, and then became more so throughout the weekend. He is feeling better today, though.   He was compliant with home exercise program.  Response to previous treatment: good  Functional change: none    Pain:  Current 1/10, worst 5/10, best 0/10   Location: right knee   Description: Aching and Tight    Objective     Sacha received therapeutic exercises to develop strength, endurance, ROM, flexibility, posture and core stabilization for 20 minutes including:  Bike 5 minutes level 10 for muscle endurance   Hip Flexor with Quad Stretch [id number]  Single Leg Divehi Deadlift 3x8 7.5#  Eccentric Hip Flexor 3x15 with yoga ball  Step Taps 3 way 4 inch 3x8     Sacha received the following manual therapy techniques: Joint mobilizations, Myofacial release and Soft tissue Mobilization were applied to the: right knee for 0 minutes, including:     Sacha participated in neuromuscular re-education activities to improve: Balance, Coordination, Sense and Proprioception for 30 minutes. The following activities were included:  Hip 3-way 30x red band each way each leg  Bridges 3x10  Single Leg  Balance on MOBO 2x30  Side plank with abduction 3x8     See EMR under Patient Instructions for exercises provided 8/17/2020.    Assessment     Patient tolerated treatment well today with no complaints of discomfort. New strengthening activities added and progressions made with previous activities and patient responded well with good fatigue noted throughout session. Patient was very challenged by all activities. Patient left treatment with increased soreness but no reports of increased pain.     Sacha is progressing well towards his goals.   Pt prognosis is Excellent.     Pt will continue to benefit from skilled outpatient physical therapy to address the deficits listed in the problem list box on initial evaluation, provide pt/family education and to maximize pt's level of independence in the home and community environment.     Pt's spiritual, cultural and educational needs considered and pt agreeable to plan of care and goals.     Anticipated barriers to physical therapy: none    Goals:  Short Term Goals: 4 weeks   1. Patient will improve knee flexion to 140 degrees in order to sit at desk.  2. Patient will be independent with HEP in order to further progress and return to maximal function.  3. Pain rating at Worst: 2/10 in order to progress towards increased independence with activity.     Long Term Goals: 8 weeks   1. Patient will improve quadriceps and hamstrings strength to 5/5 in order to run without pain.  2. Patient will improve glute med strength to 4+/5 in order to perform a step down with no knee valgus.  3. Patient will improve transverse abdominis strength to 4+/5 in order to workout.   5. Patient will self report improvement to 15% limitation on the FOTO Knee Survey.     Plan     Continue with physical therapy as planned.     Plan of care Certification: 8/17/2020 to 10/17/2020.     Initial: Outpatient Physical Therapy 2 times weekly for 8 weeks to include the following interventions: Gait  Training, Manual Therapy, Moist Heat/ Ice, Neuromuscular Re-ed, Patient Education, Therapeutic Activites and Therapeutic Exercise.     Rae Mak PTA,

## 2020-09-04 ENCOUNTER — CLINICAL SUPPORT (OUTPATIENT)
Dept: REHABILITATION | Facility: HOSPITAL | Age: 40
End: 2020-09-04
Payer: OTHER GOVERNMENT

## 2020-09-04 DIAGNOSIS — R29.898 RIGHT LEG WEAKNESS: ICD-10-CM

## 2020-09-04 PROCEDURE — 97112 NEUROMUSCULAR REEDUCATION: CPT

## 2020-09-04 PROCEDURE — 97110 THERAPEUTIC EXERCISES: CPT

## 2020-09-04 PROCEDURE — 97140 MANUAL THERAPY 1/> REGIONS: CPT

## 2020-09-04 NOTE — PROGRESS NOTES
Physical Therapy Treatment Note     Name: Sacha Fregoso  Clinic Number: [id number]    Therapy Diagnosis:   Encounter Diagnosis   Name Primary?    Right leg weakness      Physician: Mary Bocanegra MD    Visit Date: 9/4/2020    Physician Orders: PT Eval and Treat   Medical Diagnosis from Referral: M76.31 (ICD-10-CM) - Iliotibial band syndrome of right side  Evaluation Date: 8/17/2020  Authorization Period Expiration: 12/9/2020  Plan of Care Expiration: 10/17/2020  Visit # / Visits authorized: 4/12    Time In: 8:00am  Time Out: 9:00am  Total Billable Time: 53 minutes    Precautions: Standard    Subjective     Pt reports: He is a little sore and achy today. He states he woke up like that and tried doing some stretches and foam rolling.   He was compliant with home exercise program.  Response to previous treatment: good  Functional change: none    Pain:  Current 1/10, worst 5/10, best 0/10   Location: right knee   Description: Aching and Tight    Objective     Sacha received therapeutic exercises to develop strength, endurance, ROM, flexibility, posture and core stabilization for 25 minutes including:  Bike 5 minutes level 10 for muscle endurance   Hip Flexor with Quad Stretch [id number]  Single Leg British Deadlift 3x8 10#  Eccentric Hip Flexor 3x15 with yoga ball  Step Taps 3 way 6 inch 3x8  IT/TFL Stretch [id number]     Sacha received the following manual therapy techniques: Joint mobilizations, Myofacial release and Soft tissue Mobilization were applied to the: right knee for 8 minutes, including:  Soft Tissue with roller to IT Band and TFL to decrease tension      Sacha participated in neuromuscular re-education activities to improve: Balance, Coordination, Sense and Proprioception for 20 minutes. The following activities were included:  Hip 3-way 30x red band each way each leg  Bridges 3x12  Single Leg Balance on MOBO 2x30  Side plank with abduction 3x8     See EMR under Patient Instructions  for exercises provided 8/17/2020.    Assessment     A higher step was used today for step taps to challenge patient through a greater range of motion. Increased weight was used for deadlifts today. Soft tissue was performed today to the outer leg to decrease tension patient was having. soft tissue was followed up with stretches. Patient educated on working his stretches and mobility and he can continue to run within tolerance. Overall, patient tolerated today's treatment well.     Sacha is progressing well towards his goals.   Pt prognosis is Excellent.     Pt will continue to benefit from skilled outpatient physical therapy to address the deficits listed in the problem list box on initial evaluation, provide pt/family education and to maximize pt's level of independence in the home and community environment.     Pt's spiritual, cultural and educational needs considered and pt agreeable to plan of care and goals.     Anticipated barriers to physical therapy: none    Goals:  Short Term Goals: 4 weeks   1. Patient will improve knee flexion to 140 degrees in order to sit at desk.  2. Patient will be independent with HEP in order to further progress and return to maximal function.  3. Pain rating at Worst: 2/10 in order to progress towards increased independence with activity.     Long Term Goals: 8 weeks   1. Patient will improve quadriceps and hamstrings strength to 5/5 in order to run without pain.  2. Patient will improve glute med strength to 4+/5 in order to perform a step down with no knee valgus.  3. Patient will improve transverse abdominis strength to 4+/5 in order to workout.   5. Patient will self report improvement to 15% limitation on the FOTO Knee Survey.     Plan     Continue with physical therapy as planned.     Plan of care Certification: 8/17/2020 to 10/17/2020.     Initial: Outpatient Physical Therapy 2 times weekly for 8 weeks to include the following interventions: Gait Training, Manual  Therapy, Moist Heat/ Ice, Neuromuscular Re-ed, Patient Education, Therapeutic Activites and Therapeutic Exercise.     Santy Urias, PT, DPT

## 2020-09-06 ENCOUNTER — PATIENT OUTREACH (OUTPATIENT)
Dept: ADMINISTRATIVE | Facility: OTHER | Age: 40
End: 2020-09-06

## 2020-09-09 ENCOUNTER — OFFICE VISIT (OUTPATIENT)
Dept: ORTHOPEDICS | Facility: CLINIC | Age: 40
End: 2020-09-09
Payer: OTHER GOVERNMENT

## 2020-09-09 VITALS
BODY MASS INDEX: 25.62 KG/M2 | DIASTOLIC BLOOD PRESSURE: 75 MMHG | SYSTOLIC BLOOD PRESSURE: 117 MMHG | HEART RATE: 98 BPM | HEIGHT: 71 IN | WEIGHT: 183 LBS

## 2020-09-09 DIAGNOSIS — M76.31 ILIOTIBIAL BAND SYNDROME OF RIGHT SIDE: Primary | ICD-10-CM

## 2020-09-09 DIAGNOSIS — M25.561 RECURRENT PAIN OF RIGHT KNEE: ICD-10-CM

## 2020-09-09 DIAGNOSIS — R26.9 GAIT ABNORMALITY: ICD-10-CM

## 2020-09-09 PROCEDURE — 99999 PR PBB SHADOW E&M-EST. PATIENT-LVL III: CPT | Mod: PBBFAC,,, | Performed by: PHYSICAL MEDICINE & REHABILITATION

## 2020-09-09 PROCEDURE — 99213 OFFICE O/P EST LOW 20 MIN: CPT | Mod: PBBFAC | Performed by: PHYSICAL MEDICINE & REHABILITATION

## 2020-09-09 PROCEDURE — 99214 OFFICE O/P EST MOD 30 MIN: CPT | Mod: S$PBB,,, | Performed by: PHYSICAL MEDICINE & REHABILITATION

## 2020-09-09 PROCEDURE — 99214 PR OFFICE/OUTPT VISIT, EST, LEVL IV, 30-39 MIN: ICD-10-PCS | Mod: S$PBB,,, | Performed by: PHYSICAL MEDICINE & REHABILITATION

## 2020-09-09 PROCEDURE — 99999 PR PBB SHADOW E&M-EST. PATIENT-LVL III: ICD-10-PCS | Mod: PBBFAC,,, | Performed by: PHYSICAL MEDICINE & REHABILITATION

## 2020-09-09 NOTE — PATIENT INSTRUCTIONS
"Mobilization:     Its generally a good idea to assess yourself for hotspots once a month by briefly mobilizing everything with a  or foam roller, and then spend several minutes every other day of the week, for 4 - 6 weeks working on those painful spots using thumbs, foam roll, rolling pins, lacrosse ball, etc. If it hurts, then you need to do it. Once it no longer hurts, take a break on that area for a few months.     Tibialis Posterior Mobilization: Work your thumbs along the muscles just behind the shin bone. When you find a trigger point, apply pressure until about 4/10 pain, then go through full range of flexion & extension at the ankle. This will bring you up to about 6/10 pain. Wiggle the ankle for about 20 seconds, then look for a new spot. The goal is to be somewhere between pleasant and agonizing with the pressure! Do this for 6 weeks or until you can't find hotspots.     Picture credit - "Anatomy for Runners" by NELIDA Mayorga    "
1

## 2020-09-09 NOTE — PROGRESS NOTES
SPORTS MEDICINE / PM&R Follow Up Visit :    Referring Physician: No ref. provider found    Chief Complaint   Patient presents with    Right Knee - Pain       HPI: This is a 40 y.o.  male being seen in clinic today for evaluation of Pain of the Right Knee   Since last visit, the symptoms are improving.  He has been to therapy here with Santy and Rae. Started running again 2 weeks ago and knee did ok. Made 10 miles total that week. Jumped up to a high pull-up bar at the end of the week and felt knee pain as soon as jumped. Took last week off. Has now again ran 8 miles over last few days. Therapist notes he has significant core and hip girdle weakness but is making progress. Also notes recent right calf tightness. Recently bought new running shoes and moved from 10 mm drop to 4 mm with less cushioning as well.     History obtained from patient.    Past family, medical, social, surgical history, and vital signs reviewed in chart.    Review of Systems   Constitutional: Negative for chills, fever and weight loss.   HENT: Negative for hearing loss and sore throat.    Eyes: Negative for blurred vision, photophobia and pain.   Respiratory: Negative for shortness of breath.    Cardiovascular: Negative for chest pain.   Gastrointestinal: Negative for abdominal pain.   Genitourinary: Negative for dysuria.   Skin: Negative for rash.   Neurological: Negative for tingling and headaches.   Endo/Heme/Allergies: Does not bruise/bleed easily.   Psychiatric/Behavioral: Negative for depression.       General    Nursing note and vitals reviewed.  Constitutional: He is oriented to person, place, and time. He appears well-developed and well-nourished.   HENT:   Head: Normocephalic and atraumatic.   Eyes: Conjunctivae and EOM are normal. Pupils are equal, round, and reactive to light.   Neck: Neck supple.   Cardiovascular: Intact distal pulses.    Pulmonary/Chest: Effort normal. No respiratory distress.   Abdominal: He exhibits no  distension.   Neurological: He is alert and oriented to person, place, and time. He has normal reflexes.   Psychiatric: He has a normal mood and affect.     General Musculoskeletal Exam   Gait: normal       Right Knee Exam     Inspection   Erythema: absent  Swelling: absent  Effusion: absent  Deformity: absent  Bruising: absent (small medial bruise)    Tenderness   The patient is tender to palpation of the lateral joint line.    Range of Motion   Extension: normal   Flexion: normal     Tests   Meniscus   Jorge:  Medial - negative Lateral - negative  Ligament Examination Lachman: normal (-1 to 2mm) PCL-Posterior Drawer: normal (0 to 2mm)     MCL - Valgus: normal (0 to 2mm)  LCL - Varus: normal  Patella   Patellar apprehension: negative  Patellar Tracking: normal    Other   Muscle Tightness: quadriceps tightness  Sensation: normal    Left Knee Exam   Left knee exam is normal.    Inspection   Erythema: absent  Swelling: absent  Effusion: absent    Range of Motion   The patient has normal left knee ROM.    Tests   Meniscus   Jorge:  Medial - negative Lateral - negative  Stability Lachman: normal (-1 to 2mm) PCL-Posterior Drawer: normal (0 to 2mm)  MCL - Valgus: normal (0 to 2mm)  LCL - Varus: normal (0 to 2mm)  Patella   Patellar apprehension: negative  Patellar Tracking: normal    Other   Sensation: normal    Right Hip Exam   Right hip exam is normal.     Tests   Pain w/ forced internal rotation (MELVI): absent  Left Hip Exam   Left hip exam is normal.    Tests   Pain w/ forced internal rotation (MELVI): absent          Muscle Strength   Right Lower Extremity   Hip Abduction: 4/5   Hip Adduction: 5/5   Hip Flexion: 5/5   Quadriceps:  5/5   Hamstrin/5   Left Lower Extremity   Hip Abduction: 4/5   Hip Adduction: 5/5   Hip Flexion: 5/5   Quadriceps:  5/5   Hamstrin/5     Reflexes     Left Side  Achilles:  2+  Quadriceps:  2+    Right Side   Achilles:  2+  Quadriceps:  2+    Vascular Exam     Right  Pulses  Dorsalis Pedis:      2+          Left Pulses  Dorsalis Pedis:      2+                IMPRESSION/PLAN: This is a 40 y.o.  male with:    Iliotibial band syndrome of right side    Recurrent pain of right knee    Gait abnormality        The findings were discussed with Sacha in detail. He is making good progress and will continue to get benefit from therapy and ther-ex. We discussed how to ease back into running. At most he can increase volume by 20% per week until back to his baseline. But should also gently ease into using lower drop shoes. This likely explains his leg tightness. Encouraged a few more weeks of PT and continue doing his HEP at home. We also briefly discussed dynamic warm-ups instead of static stretching.  He was provided with this plan in writing. All of his questions were answered. He will follow up with me JIMMY.     Mary Bocanegra M.D.  Sports Medicine

## 2020-09-10 ENCOUNTER — CLINICAL SUPPORT (OUTPATIENT)
Dept: REHABILITATION | Facility: HOSPITAL | Age: 40
End: 2020-09-10
Payer: OTHER GOVERNMENT

## 2020-09-10 DIAGNOSIS — R29.898 RIGHT LEG WEAKNESS: ICD-10-CM

## 2020-09-10 PROCEDURE — 97112 NEUROMUSCULAR REEDUCATION: CPT | Mod: CQ

## 2020-09-10 PROCEDURE — 97110 THERAPEUTIC EXERCISES: CPT | Mod: CQ

## 2020-09-10 NOTE — PROGRESS NOTES
Physical Therapy Treatment Note     Name: Sacha Fregoso  Clinic Number: [id number]    Therapy Diagnosis:   Encounter Diagnosis   Name Primary?    Right leg weakness      Physician: Mary Bocanegra MD    Visit Date: 9/10/2020    Physician Orders: PT Eval and Treat   Medical Diagnosis from Referral: M76.31 (ICD-10-CM) - Iliotibial band syndrome of right side  Evaluation Date: 8/17/2020  Authorization Period Expiration: 12/9/2020  Plan of Care Expiration: 10/17/2020  Visit # / Visits authorized: 5/12    Progress Note due: 9/17/2020    Time In: 8:30am  Time Out: 9:20am  Total Billable Time: 50 minutes    Precautions: Standard    Subjective     Pt reports: He took last week off of running for a few days because his knee just didn't feel right. He started back up running on Saturday and he felt good and has been feeling good since. He has not pushed past the 3 mile juan to prevent pain.  He was compliant with home exercise program.  Response to previous treatment: good  Functional change: none    Pain:  Current 1/10, worst 5/10, best 0/10   Location: right knee   Description: Aching and Tight    Objective     Sacha received therapeutic exercises to develop strength, endurance, ROM, flexibility, posture and core stabilization for 30 minutes including:  Bike 5 minutes level 10 for muscle endurance   Dynamic stretching 50 feet each; hamstring, quad, gastrocnemius, hip  Single Leg Bulgarian Deadlift 3x10 10#  Eccentric Hip Flexor 3x15 with yoga ball  Step Taps 3 way 6 inch 3x8  IT/TFL Stretch [id number]  Mobo board 2x30 each Bilateral  Hip Flexor with Quad Stretch [id number] deferred    Sacha received the following manual therapy techniques: Joint mobilizations, Myofacial release and Soft tissue Mobilization were applied to the: right knee for 0 minutes, including:  Soft Tissue with roller to IT Band and TFL to decrease tension      Sacha participated in neuromuscular re-education activities to improve:  Balance, Coordination, Sense and Proprioception for 20 minutes. The following activities were included:  Hip 3-way 30x red band each way each leg  Bridges 3x12  Single Leg Balance on MOBO 2x30  Lunge to single leg stance on bosu 3x8  Side plank with abduction 3x8 deferred      See EMR under Patient Instructions for exercises provided 8/17/2020.    Assessment     Patient tolerated treatment well today with no complaints of discomfort. Increased reps today and added new activities and patient responded well with good fatigue noted. Verbal cues necessary for core activation and coordination for efficient performance of activities. Patient left treatment with no reports of increased pain and reports of slight increased soreness.     Sacha is progressing well towards his goals.   Pt prognosis is Excellent.     Pt will continue to benefit from skilled outpatient physical therapy to address the deficits listed in the problem list box on initial evaluation, provide pt/family education and to maximize pt's level of independence in the home and community environment.     Pt's spiritual, cultural and educational needs considered and pt agreeable to plan of care and goals.     Anticipated barriers to physical therapy: none    Goals:  Short Term Goals: 4 weeks   1. Patient will improve knee flexion to 140 degrees in order to sit at desk.  2. Patient will be independent with HEP in order to further progress and return to maximal function.  3. Pain rating at Worst: 2/10 in order to progress towards increased independence with activity.     Long Term Goals: 8 weeks   1. Patient will improve quadriceps and hamstrings strength to 5/5 in order to run without pain.  2. Patient will improve glute med strength to 4+/5 in order to perform a step down with no knee valgus.  3. Patient will improve transverse abdominis strength to 4+/5 in order to workout.   5. Patient will self report improvement to 15% limitation on the FOTO Knee  Survey.     Plan     Continue with physical therapy as planned.     Plan of care Certification: 8/17/2020 to 10/17/2020.     Initial: Outpatient Physical Therapy 2 times weekly for 8 weeks to include the following interventions: Gait Training, Manual Therapy, Moist Heat/ Ice, Neuromuscular Re-ed, Patient Education, Therapeutic Activites and Therapeutic Exercise.     Rae Mak, PTA

## 2020-09-11 ENCOUNTER — CLINICAL SUPPORT (OUTPATIENT)
Dept: REHABILITATION | Facility: HOSPITAL | Age: 40
End: 2020-09-11
Payer: OTHER GOVERNMENT

## 2020-09-11 DIAGNOSIS — R29.898 RIGHT LEG WEAKNESS: ICD-10-CM

## 2020-09-11 PROCEDURE — 97110 THERAPEUTIC EXERCISES: CPT | Mod: CQ

## 2020-09-11 PROCEDURE — 97112 NEUROMUSCULAR REEDUCATION: CPT | Mod: CQ

## 2020-09-11 NOTE — PROGRESS NOTES
Physical Therapy Treatment Note     Name: Sacha Fregoso  Clinic Number: [id number]    Therapy Diagnosis:   Encounter Diagnosis   Name Primary?    Right leg weakness      Physician: Mary Bocanegra MD    Visit Date: 9/11/2020    Physician Orders: PT Eval and Treat   Medical Diagnosis from Referral: M76.31 (ICD-10-CM) - Iliotibial band syndrome of right side  Evaluation Date: 8/17/2020  Authorization Period Expiration: 12/9/2020  Plan of Care Expiration: 10/17/2020  Visit # / Visits authorized: 6/12    Progress Note due: 9/17/2020    Time In: 9:00am  Time Out: 9:50am  Total Billable Time: 50 minutes    Precautions: Standard    Subjective     Pt reports: he feels good after yesterday's visit and he is not really sore today.   He was compliant with home exercise program.  Response to previous treatment: good  Functional change: none    Pain:  Current 0/10, worst 5/10, best 0/10   Location: right knee   Description: Aching and Tight    Objective     Sacha received therapeutic exercises to develop strength, endurance, ROM, flexibility, posture and core stabilization for 30 minutes including:  Bike 5 minutes level 10 for muscle endurance   Dynamic stretching 50 feet each; hamstring, quad, gastrocnemius, hip, hip rotators  Single Leg American Deadlift 3x10 10#  Eccentric Hip Flexor 3x15 with yoga ball  Step Taps 3 way 6 inch 3x8  Monster walks green theraband 5 laps of 25 ft  [employer] board 2x30 each Bilateral deferred   Hip Flexor with Quad Stretch [id number] deferred  IT/TFL Stretch [id number] deferred    Sacha received the following manual therapy techniques: Joint mobilizations, Myofacial release and Soft tissue Mobilization were applied to the: right knee for 0 minutes, including:  Soft Tissue with roller to IT Band and TFL to decrease tension      Sacha participated in neuromuscular re-education activities to improve: Balance, Coordination, Sense and Proprioception for 20 minutes. The following  activities were included:  Hip 3-way 30x green band each way each leg  Bridges with leg extension 3x10  Lunge to single leg stance on bosu 3x8  Side plank with abduction 3x8      See EMR under Patient Instructions for exercises provided 8/17/2020.    Assessment     Patient tolerated treatment well today with no complaints of discomfort. New activities added and prior activities made more challenging and patient responded well with good fatigue noted. Patient was very challenged by activities today displayed by increased fatigue. Patient left treatment with increased soreness but no reports of increased pain.     Sacha is progressing well towards his goals.   Pt prognosis is Excellent.     Pt will continue to benefit from skilled outpatient physical therapy to address the deficits listed in the problem list box on initial evaluation, provide pt/family education and to maximize pt's level of independence in the home and community environment.     Pt's spiritual, cultural and educational needs considered and pt agreeable to plan of care and goals.     Anticipated barriers to physical therapy: none    Goals:  Short Term Goals: 4 weeks   1. Patient will improve knee flexion to 140 degrees in order to sit at desk.  2. Patient will be independent with HEP in order to further progress and return to maximal function.  3. Pain rating at Worst: 2/10 in order to progress towards increased independence with activity.     Long Term Goals: 8 weeks   1. Patient will improve quadriceps and hamstrings strength to 5/5 in order to run without pain.  2. Patient will improve glute med strength to 4+/5 in order to perform a step down with no knee valgus.  3. Patient will improve transverse abdominis strength to 4+/5 in order to workout.   5. Patient will self report improvement to 15% limitation on the FOTO Knee Survey.     Plan     Continue with physical therapy as planned.     Plan of care Certification: 8/17/2020 to  10/17/2020.     Initial: Outpatient Physical Therapy 2 times weekly for 8 weeks to include the following interventions: Gait Training, Manual Therapy, Moist Heat/ Ice, Neuromuscular Re-ed, Patient Education, Therapeutic Activites and Therapeutic Exercise.     Rae Mak, PTA

## 2020-09-18 ENCOUNTER — CLINICAL SUPPORT (OUTPATIENT)
Dept: REHABILITATION | Facility: HOSPITAL | Age: 40
End: 2020-09-18
Payer: OTHER GOVERNMENT

## 2020-09-18 DIAGNOSIS — R29.898 RIGHT LEG WEAKNESS: ICD-10-CM

## 2020-09-18 PROCEDURE — 97112 NEUROMUSCULAR REEDUCATION: CPT

## 2020-09-18 PROCEDURE — 97110 THERAPEUTIC EXERCISES: CPT

## 2020-09-18 NOTE — PROGRESS NOTES
Physical Therapy Treatment/Progress Note     Name: Sacha Fregoso  Clinic Number: [id number]    Therapy Diagnosis:   Encounter Diagnosis   Name Primary?    Right leg weakness      Physician: Mary Bocanegra MD    Visit Date: 9/18/2020    Physician Orders: PT Eval and Treat   Medical Diagnosis from Referral: M76.31 (ICD-10-CM) - Iliotibial band syndrome of right side  Evaluation Date: 8/17/2020  Authorization Period Expiration: 12/9/2020  Plan of Care Expiration: 10/17/2020  Visit # / Visits authorized: 7/12    Time In: 7:55am  Time Out: 8:45am  Total Billable Time: 43 minutes    Precautions: Standard    Subjective     Pt reports: His knee has been feeling really good. He states he has been going on back to back runs and that has been feeling fine. He states he gets a little sore but once he stretches it isn't bad.   He was compliant with home exercise program.  Response to previous treatment: good  Functional change: able to run on back to back days with no pain    Pain:  Current 0/10, worst 1/10, best 0/10   Location: right knee   Description: Aching and Tight    Objective     Update: ROM    Right (degrees) Left (degrees)   Knee Flexion (140) 140 140   Knee Extension (0) 0 0     Sacha received therapeutic exercises to develop strength, endurance, ROM, flexibility, posture and core stabilization for 28 minutes including:  Bike 5 minutes level 10 for muscle endurance   Dynamic stretching 50 feet each; hamstring, quad, gastrocnemius, hip, hip rotators  Single Leg Bangladeshi Deadlift 3x10 10# deferred  Eccentric Hip Flexor 3x15 with yoga ball  Step Taps 3 way 6 inch 3x8  Mobo board 2x30 each hand transfers   Hip Flexor with Quad Stretch [id number] deferred  IT/TFL Stretch [id number] deferred    Sacha received the following manual therapy techniques: Joint mobilizations, Myofacial release and Soft tissue Mobilization were applied to the: right knee for 0 minutes, including:  Soft Tissue with roller to IT Band  and TFL to decrease tension      Sacha participated in neuromuscular re-education activities to improve: Balance, Coordination, Sense and Proprioception for 15 minutes. The following activities were included:  Monster walks green theraband 5 laps of 25 ft  Bridges with Hamstring Curls on yoga ball 3x10  Lunge to single leg stance on bosu 3x8  Side plank with abduction 3x8 deferred     See EMR under Patient Instructions for exercises provided 8/17/2020.    Assessment     Patient demonstrates increased range of motion with no pain and increased subjective functional ability on the FOTO survey. Patient is progressing very well in clinic and with his home exercise program. Patient tolerated today's treatment very well.         Sacha is progressing well towards his goals.   Pt prognosis is Excellent.     Pt will continue to benefit from skilled outpatient physical therapy to address the deficits listed in the problem list box on initial evaluation, provide pt/family education and to maximize pt's level of independence in the home and community environment.     Pt's spiritual, cultural and educational needs considered and pt agreeable to plan of care and goals.     Anticipated barriers to physical therapy: none    Goals:  Short Term Goals: 4 weeks   1. Patient will improve knee flexion to 140 degrees in order to sit at desk. GOAL MET 9/18/2020  2. Patient will be independent with HEP in order to further progress and return to maximal function. GOAL MET 9/18/2020  3. Pain rating at Worst: 2/10 in order to progress towards increased independence with activity. GOAL MET 9/18/2020     Long Term Goals: 8 weeks   1. Patient will improve quadriceps and hamstrings strength to 5/5 in order to run without pain. PROGRESSING  2. Patient will improve glute med strength to 4+/5 in order to perform a step down with no knee valgus. PROGRESSING  3. Patient will improve transverse abdominis strength to 4+/5 in order to workout.  PROGRESSING  5. Patient will self report improvement to 15% limitation on the FOTO Knee Survey. PROGRESSING    Plan     Continue with physical therapy as planned.     Plan of care Certification: 8/17/2020 to 10/17/2020.     Initial: Outpatient Physical Therapy 2 times weekly for 8 weeks to include the following interventions: Gait Training, Manual Therapy, Moist Heat/ Ice, Neuromuscular Re-ed, Patient Education, Therapeutic Activites and Therapeutic Exercise.     Santy Urias, PT, DPT

## 2020-09-28 ENCOUNTER — CLINICAL SUPPORT (OUTPATIENT)
Dept: REHABILITATION | Facility: HOSPITAL | Age: 40
End: 2020-09-28
Payer: OTHER GOVERNMENT

## 2020-09-28 DIAGNOSIS — R29.898 RIGHT LEG WEAKNESS: ICD-10-CM

## 2020-09-28 PROCEDURE — 97110 THERAPEUTIC EXERCISES: CPT

## 2020-09-28 PROCEDURE — 97112 NEUROMUSCULAR REEDUCATION: CPT

## 2020-09-28 NOTE — PROGRESS NOTES
Physical Therapy Treatment Note     Name: Sacha Fregoso  Clinic Number: [id number]    Therapy Diagnosis:   Encounter Diagnosis   Name Primary?    Right leg weakness      Physician: Mary Bocanegra MD    Visit Date: 9/28/2020    Physician Orders: PT Eval and Treat   Medical Diagnosis from Referral: M76.31 (ICD-10-CM) - Iliotibial band syndrome of right side  Evaluation Date: 8/17/2020  Authorization Period Expiration: 12/9/2020  Plan of Care Expiration: 10/17/2020  Visit # / Visits authorized: 8/12    Time In: 9:15am  Time Out: 10:00am  Total Billable Time: 42 minutes    Precautions: Standard    Subjective     Pt reports: He has been feeling pretty good. He states he ran 5 miles yesterday with no pain.    He was compliant with home exercise program.  Response to previous treatment: good  Functional change: able to run longer distances pain free    Pain:  Current 0/10, worst 1/10, best 0/10   Location: right knee   Description: Aching and Tight    Objective     Sacha received therapeutic exercises to develop strength, endurance, ROM, flexibility, posture and core stabilization for 25 minutes including:  Bike 5 minutes level 10 for muscle endurance   Dynamic stretching 50 feet each; hamstring, quad, gastrocnemius, hip, hip rotators  Single Leg Amharic Deadlift 3x10 10#   Eccentric Hip Flexor 3x15 with yoga ball  Step Taps 3 way 8 inch 3x8  Mobo board 2x30   Hip Flexor with Quad Stretch [id number] deferred  IT/TFL Stretch [id number] deferred    Sacha received the following manual therapy techniques: Joint mobilizations, Myofacial release and Soft tissue Mobilization were applied to the: right knee for 0 minutes, including:  Soft Tissue with roller to IT Band and TFL to decrease tension      Sacha participated in neuromuscular re-education activities to improve: Balance, Coordination, Sense and Proprioception for 17 minutes. The following activities were included:  [employer] walks green theraband 5 laps  of 25 ft  Bridges with Hamstring Curls on yoga ball 3x10  Lunge to single leg stance on bosu 3x8  Side plank with abduction 3x8 deferred     See EMR under Patient Instructions for exercises provided 8/17/2020.    Assessment     Increased height for step taps was used for the height of a standard step. Patient educated on slowly increasing his running distance as long as it is pain free. Patient is progressing very well and he tolerated today's treatment well with no issues.     Sacha is progressing well towards his goals.   Pt prognosis is Excellent.     Pt will continue to benefit from skilled outpatient physical therapy to address the deficits listed in the problem list box on initial evaluation, provide pt/family education and to maximize pt's level of independence in the home and community environment.     Pt's spiritual, cultural and educational needs considered and pt agreeable to plan of care and goals.     Anticipated barriers to physical therapy: none    Goals:  Short Term Goals: 4 weeks   1. Patient will improve knee flexion to 140 degrees in order to sit at desk. GOAL MET 9/18/2020  2. Patient will be independent with HEP in order to further progress and return to maximal function. GOAL MET 9/18/2020  3. Pain rating at Worst: 2/10 in order to progress towards increased independence with activity. GOAL MET 9/18/2020     Long Term Goals: 8 weeks   1. Patient will improve quadriceps and hamstrings strength to 5/5 in order to run without pain. PROGRESSING  2. Patient will improve glute med strength to 4+/5 in order to perform a step down with no knee valgus. PROGRESSING  3. Patient will improve transverse abdominis strength to 4+/5 in order to workout. PROGRESSING  5. Patient will self report improvement to 15% limitation on the FOTO Knee Survey. PROGRESSING    Plan     Continue with physical therapy as planned.     Plan of care Certification: 8/17/2020 to 10/17/2020.     Initial: Outpatient Physical  Therapy 2 times weekly for 8 weeks to include the following interventions: Gait Training, Manual Therapy, Moist Heat/ Ice, Neuromuscular Re-ed, Patient Education, Therapeutic Activites and Therapeutic Exercise.     Santy Urias, PT, DPT

## 2020-09-30 ENCOUNTER — TELEPHONE (OUTPATIENT)
Dept: ORTHOPEDICS | Facility: CLINIC | Age: 40
End: 2020-09-30

## 2020-10-02 ENCOUNTER — CLINICAL SUPPORT (OUTPATIENT)
Dept: REHABILITATION | Facility: HOSPITAL | Age: 40
End: 2020-10-02
Payer: OTHER GOVERNMENT

## 2020-10-02 ENCOUNTER — PROCEDURE VISIT (OUTPATIENT)
Dept: ORTHOPEDICS | Facility: CLINIC | Age: 40
End: 2020-10-02
Payer: OTHER GOVERNMENT

## 2020-10-02 VITALS
BODY MASS INDEX: 25.62 KG/M2 | DIASTOLIC BLOOD PRESSURE: 85 MMHG | HEART RATE: 78 BPM | SYSTOLIC BLOOD PRESSURE: 134 MMHG | HEIGHT: 71 IN | WEIGHT: 183 LBS

## 2020-10-02 DIAGNOSIS — R29.898 RIGHT LEG WEAKNESS: ICD-10-CM

## 2020-10-02 DIAGNOSIS — M76.31 ILIOTIBIAL BAND SYNDROME OF RIGHT SIDE: ICD-10-CM

## 2020-10-02 DIAGNOSIS — R26.9 GAIT ABNORMALITY: ICD-10-CM

## 2020-10-02 DIAGNOSIS — G89.29 CHRONIC PAIN OF RIGHT KNEE: Primary | ICD-10-CM

## 2020-10-02 DIAGNOSIS — M25.561 CHRONIC PAIN OF RIGHT KNEE: Primary | ICD-10-CM

## 2020-10-02 PROCEDURE — 97110 PR THERAPEUTIC EXERCISES: ICD-10-PCS | Mod: ,,, | Performed by: PHYSICAL MEDICINE & REHABILITATION

## 2020-10-02 PROCEDURE — 97112 NEUROMUSCULAR REEDUCATION: CPT

## 2020-10-02 PROCEDURE — 99215 OFFICE O/P EST HI 40 MIN: CPT | Mod: S$PBB,,, | Performed by: PHYSICAL MEDICINE & REHABILITATION

## 2020-10-02 PROCEDURE — 99215 PR OFFICE/OUTPT VISIT, EST, LEVL V, 40-54 MIN: ICD-10-PCS | Mod: S$PBB,,, | Performed by: PHYSICAL MEDICINE & REHABILITATION

## 2020-10-02 PROCEDURE — 97110 THERAPEUTIC EXERCISES: CPT

## 2020-10-02 PROCEDURE — 97140 MANUAL THERAPY 1/> REGIONS: CPT

## 2020-10-02 PROCEDURE — 97110 THERAPEUTIC EXERCISES: CPT | Mod: ,,, | Performed by: PHYSICAL MEDICINE & REHABILITATION

## 2020-10-02 NOTE — PATIENT INSTRUCTIONS
Optogait analysis, 6.5 mph, preferred darell.          Here is the thing, I'm not seeing a clear answer or cause of knee pain in your gait. I think you have a knee injury that weakness was a primary cause of, but at this point it isn't necessarily due to your gait, it's just that running in general is irritating it. Overall, your right leg appears to be at 4% more powerful than your left leg. Sometimes the stronger leg is injured because it's carrying more of the burden. I was serious that you might want to do one or two extra reps on the left leg than the right.  Besides that, continuing some general strength training and working jumping / landing mechanics may help.  The biggest actionable thing is your darell at 160, which may be slower on the road.  Darell - Your darell is on the slow side at 160 and contributing to the over striding. We want to speed up the leg turnover, but take shorter strides so that your pace doesnt change. Once you have warmed up and settled into your run, count how many times one foot hits the ground in 30 seconds, then multiply by 2. Try to get to at least 86, then possibly work up to higher numbers when running faster. If the number is less than 86, try shortening your stride and turning over quicker for a few minutes, and then forget about it - just run! 5 minutes later, repeat the process. Or try running with a metronome malorie or maybe one of these apps: http://www.runningmetronome.org/top-5-running-apps/ Gradually youll find your darell picking up.        Analysis at 6.5 mph, darell 170:        At higher darell, right leg sped up same proportion as left leg with ground contact time, but the flight distance equalled out a little more. Your left foot landing looked better as well. Aim for a darell of 170 at the low end.        Left foot tended to land with a heel strike, but not significant over-striding.        Right foot almost always lands mid-foot and very near your body.  "That's a good thing!        Right leg pushes off with 20 degrees hip extension, which is just perfect!        I messed and repasted same pic, but left leg pushes off with 17 degrees extension. Not bad at all, but not as good as right leg.          From a frontal plane standpoint, you are doing very well. No knee dipping in or funny rotation through hips or feet. Ongoing strength training will make sure this persists.      This is where I usually list out tons of exercises, but you've already been through most of them with PT. So instead, here are things we talked about adding in, changing, or making sure to continue.    Definitely keep mobilizing IT band:  IT Band Mobilization: Using 'the Stick', a foam roller, or lacross ball, roll all aspects of the IT band, but not over bony areas, for a minute or two every other day for about 6 weeks. You can also try pausing over a painful area and then flexing and extending the knee for 20 seconds.     From "Anatomy for Runners" by NELIDA Mayorga    Keep stretching quads.  (mobilize before running, stretch after running).      Consider adding "Dee lunges" to your warm-ups: http://[employer]/ikrlj-6654-hegbvtg-times-article/      Do your single leg deadlifts with back leg slightly bent, be sure to keep hips level (not opening up), and stick your butt back as you hinge forward rather than putting so much weight forward that you are up on your toes. More than you ever wanted to know: https://www.[employer].com/blog/master-the-single-leg-rdl-hip-hinge      Add in C-slides to your exercises and warm-ups: https://youtu.be/W6lnzZ1G-jW    Ongoing lateral hip stability work:    5. Jay hip exercises: 3 x 8 - 12 (Start with 8 reps. Once you build up to 12, use a tighter band.)    A. Theraband pulling straight out to side. (second exercise in this video: https://youtu.be/wT2aaWiSTeA )             B. Theraband pulling 45 degrees posterior.            C. Standing hip " "rotations with resistance:  https://www.youtube.com/watch?v=gfHmUIuDmG0          6. Monster Walks: 3 x 30 - 60 seconds. See video: https://www.youtube.com/watch?p=dqQ992AJTuo  You could also do monster walks going around a small square with 5 to 10 foot sides. Go around it in each direction.    7. Standing Clamshells: 3 x 8 - 12 reps  https://www.youtube.com/watch?v=SDQyLXpZ5FX    8. Standing Wall Clams: 3 x until it burns        Start working on landing mechanics with some short hop downs, emphasizing soft "ninja" landing while keeping knees straight and also getting hips back to spread load over glutes and quads.   Later, add in box jumps. Do these with PT only for now.      Otherwise, keep slowly adding mileage. Pain that goes away within a mile is generally something completely safe to keep running through!        "

## 2020-10-02 NOTE — PROGRESS NOTES
SPORTS MEDICINE / PM&R Runner Gait Analysis H&P :    Referring Physician: No ref. provider found    HPI: Sacha Fregoso is a 40 y.o.  male here today for clinical running gait analysis. His goals for this analysis are: to find out what he needs to improve on for PT.   He has been running for 3 days and typically runs 12 miles per week. He typically runs alone. He previously has not had running related injuries. He does currently strength train, which includes cross fit training, dead lifts. His current running shoes are saucony [place]. His next race is [place] at the end of Jan 2021.     Current problems with running include right knee pain. He feels throbbing and aching pain on his right knee .The symptoms are worsening. He has tried execrises with improvement. He is currently in  therapy [facility] with Santy Urias    History obtained from patient.     Past family, medical, social, and surgical history reviewed in chart.    Review of Systems   Constitutional: Negative for chills, fever and weight loss.   HENT: Negative for hearing loss and sore throat.    Eyes: Negative for blurred vision, photophobia and pain.   Respiratory: Negative for shortness of breath.    Cardiovascular: Negative for chest pain.   Gastrointestinal: Negative for abdominal pain.   Genitourinary: Negative for dysuria.   Skin: Negative for rash.   Neurological: Negative for tingling and headaches.   Endo/Heme/Allergies: Does not bruise/bleed easily.   Psychiatric/Behavioral: Negative for depression.       Physical Exam:    Running Gait Analysis performed with assistance of Optogait and/or slow-motion video reveals:    Vertical Displacement: Increased  Head Position: Normal  Relative Landing Position: Near Landing  Lumbar: Neutral  Pelvic Control: Neutral control  Rotational Control: Normal  Knee Position: Neutral, both knees pointing straight  Stride Width: Normal  Arm Carriage: Neutral  Tiara: 160 steps per minute  Hip Extension:  Good    Range of Motion:  R. Hip Extension: + 5 degrees L. Hip Extension: + 5 degrees  R. Ankle DF: 20 degrees L. Ankle DF: 20 degrees  R. Great Toe Extension: 20 degrees L. Great Toe Extension: 20 degrees  R. Hip Flexion: > 70 degrees L. Hip Flexion: > 70 degrees      General    Nursing note and vitals reviewed.  Constitutional: He is oriented to person, place, and time. He appears well-developed and well-nourished.   HENT:   Head: Normocephalic and atraumatic.   Eyes: Conjunctivae and EOM are normal. Pupils are equal, round, and reactive to light.   Neck: Neck supple.   Cardiovascular: Intact distal pulses.    Pulmonary/Chest: Effort normal. No respiratory distress.   Abdominal: He exhibits no distension.   Neurological: He is alert and oriented to person, place, and time. He has normal reflexes.   Psychiatric: He has a normal mood and affect.     General Musculoskeletal Exam   Gait: normal       Right Knee Exam     Inspection   Erythema: absent  Swelling: absent  Effusion: absent  Deformity: absent  Bruising: absent (small medial bruise)    Tenderness   The patient is tender to palpation of the iliotibial band (only tender near Gerdy's tubercle).    Range of Motion   Extension: normal   Flexion: normal     Tests   Meniscus   Jorge:  Medial - negative Lateral - negative  Ligament Examination Lachman: normal (-1 to 2mm) PCL-Posterior Drawer: normal (0 to 2mm)     MCL - Valgus: normal (0 to 2mm)  LCL - Varus: normal  Patella   Patellar apprehension: negative  Patellar Tracking: normal    Other   Muscle Tightness: quadriceps tightness  Sensation: normal    Left Knee Exam   Left knee exam is normal.    Inspection   Erythema: absent  Swelling: absent  Effusion: absent    Range of Motion   The patient has normal left knee ROM.    Tests   Meniscus   Jorge:  Medial - negative Lateral - negative  Stability Lachman: normal (-1 to 2mm) PCL-Posterior Drawer: normal (0 to 2mm)  MCL - Valgus: normal (0 to 2mm)  LCL -  "Varus: normal (0 to 2mm)  Patella   Patellar apprehension: negative  Patellar Tracking: normal    Other   Muscle Tightness: quadriceps tightness  Sensation: normal    Right Hip Exam   Right hip exam is normal.     Tests   Pain w/ forced internal rotation (MELVI): absent  Left Hip Exam   Left hip exam is normal.    Tests   Pain w/ forced internal rotation (MELVI): absent          Muscle Strength   Right Lower Extremity   Hip Abduction: 5/5   Hip Adduction: 5/5   Hip Flexion: 5/5   Quadriceps:  5/5   Hamstrin/5   Left Lower Extremity   Hip Abduction: 5/5   Hip Adduction: 5/5   Hip Flexion: 5/5   Quadriceps:  5/5   Hamstrin/5     Reflexes     Left Side  Achilles:  2+  Quadriceps:  2+    Right Side   Achilles:  2+  Quadriceps:  2+    Vascular Exam     Right Pulses  Dorsalis Pedis:      2+          Left Pulses  Dorsalis Pedis:      2+              IMPRESSION/PLAN: This is a 40 y.o.  male with Pain of the Right Knee      1. Chronic pain of right knee    2. Iliotibial band syndrome of right side    3. Gait abnormality       Today's findings were discussed with Sacha in detail. We reviewed his running form, training history, and exam findings to explain how this is contributing to the injury. He actually shows more weakness in left leg with stronger right leg. No obvious cause noted for lateral knee pain except slow darell. Discussed that over-using stronger right leg may be part of the problem. Otherwise evaluated how he was performing many exercises and added others. He was taught multiple stretches, mobilizations, exercises, and drills to help correct these mechanics. This should help with his injury and hopefully also with running efficiency. He was given this plan in writing. We discussed how to continue with regular training and follow the "Activity Modification Guidelines".  All questions were solicited and answered. He will follow up with me PRN.    Twenty five minutes were spent obtaining detailed " history of his injury and training, and routine physical exam. Another 25 minutes were spent obtaining and reviewing slow motion video of him running on a treadmill from 2 to 3 different angles, as well as hop down tests and single leg squat tests. The findings of these videos were discussed with Sacha. Forty minutes were spent on running specific range of motion testing, special testing, and educating him on the plan detailed below.     In addition to his slow motion videos, here is what the patient was sent:    Optogait analysis, 6.5 mph, preferred darell.          Here is the thing, I'm not seeing a clear answer or cause of knee pain in your gait. I think you have a knee injury that weakness was a primary cause of, but at this point it isn't necessarily due to your gait, it's just that running in general is irritating it. Overall, your right leg appears to be at 4% more powerful than your left leg. Sometimes the stronger leg is injured because it's carrying more of the burden. I was serious that you might want to do one or two extra reps on the left leg than the right.  Besides that, continuing some general strength training and working jumping / landing mechanics may help.  The biggest actionable thing is your darell at 160, which may be slower on the road.  Darell - Your darell is on the slow side at 160 and contributing to the over striding. We want to speed up the leg turnover, but take shorter strides so that your pace doesnt change. Once you have warmed up and settled into your run, count how many times one foot hits the ground in 30 seconds, then multiply by 2. Try to get to at least 86, then possibly work up to higher numbers when running faster. If the number is less than 86, try shortening your stride and turning over quicker for a few minutes, and then forget about it - just run! 5 minutes later, repeat the process. Or try running with a metronome malorie or maybe one of these apps:  "http://www.runningmetBlogRadioome.org/top-5-running-apps/ Gradually youll find your darell picking up.        Analysis at 6.5 mph, darell 170:        At higher darell, right leg sped up same proportion as left leg with ground contact time, but the flight distance equalled out a little more. Your left foot landing looked better as well. Aim for a darell of 170 at the low end.        Left foot tended to land with a heel strike, but not significant over-striding.        Right foot almost always lands mid-foot and very near your body. That's a good thing!        Right leg pushes off with 20 degrees hip extension, which is just perfect!        I messed and repasted same pic, but left leg pushes off with 17 degrees extension. Not bad at all, but not as good as right leg.          From a frontal plane standpoint, you are doing very well. No knee dipping in or funny rotation through hips or feet. Ongoing strength training will make sure this persists.      This is where I usually list out tons of exercises, but you've already been through most of them with PT. So instead, here are things we talked about adding in, changing, or making sure to continue.    Definitely keep mobilizing IT band:  IT Band Mobilization: Using 'the Stick', a foam roller, or lacross ball, roll all aspects of the IT band, but not over bony areas, for a minute or two every other day for about 6 weeks. You can also try pausing over a painful area and then flexing and extending the knee for 20 seconds.     From "Anatomy for Runners" by NELIDA Mayorga    Keep stretching quads.  (mobilize before running, stretch after running).      Consider adding "Greenie lunges" to your warm-ups: http://Trendyol.Apprity/zmoxx-8942-jnnoves-times-article/      Do your single leg deadlifts with back leg slightly bent, be sure to keep hips level (not opening up), and stick your butt back as you hinge forward rather than putting so much weight forward that you are up on your toes. " "More than you ever wanted to know: https://www.[employer].Chromasun/blog/master-the-single-leg-rdl-hip-hinge      Add in C-slides to your exercises and warm-ups: https://youtu.be/R4rfeI3A-yU    Ongoing lateral hip stability work:    5. Jay hip exercises: 3 x 8 - 12 (Start with 8 reps. Once you build up to 12, use a tighter band.)    A. Theraband pulling straight out to side. (second exercise in this video: https://youtu.be/gC5yvAcVWpB )             B. Theraband pulling 45 degrees posterior.            C. Standing hip rotations with resistance:  https://www.youtube.com/watch?v=gfHmUIuDmG0          6. Monster Walks: 3 x 30 - 60 seconds. See video: https://www.youtube.com/watch?t=bbI374AOFwz  You could also do monster walks going around a small square with 5 to 10 foot sides. Go around it in each direction.    7. Standing Clamshells: 3 x 8 - 12 reps  https://www.youtube.com/watch?v=OZPqVLpI5QU    8. Standing Wall Clams: 3 x until it burns            Start working on landing mechanics with some short hop downs, emphasizing soft "ninja" landing while keeping knees straight and also getting hips back to spread load over glutes and quads.   Later, add in box jumps. Do these with PT only for now.      Otherwise, keep slowly adding mileage. Pain that goes away within a mile is generally something completely safe to keep running through!      Mary Bocanegra M.D.  Sports Medicine        "

## 2020-10-02 NOTE — PROGRESS NOTES
Physical Therapy Treatment Note     Name: Sacha Fregoso  Clinic Number: [id number]    Therapy Diagnosis:   Encounter Diagnosis   Name Primary?    Right leg weakness      Physician: Mary Bocanegra MD    Visit Date: 10/2/2020    Physician Orders: PT Eval and Treat   Medical Diagnosis from Referral: M76.31 (ICD-10-CM) - Iliotibial band syndrome of right side  Evaluation Date: 8/17/2020  Authorization Period Expiration: 12/9/2020  Plan of Care Expiration: 10/17/2020  Visit # / Visits authorized: 9/12    Time In: 7:55am   Time Out: 8:45am  Total Billable Time: 44 minutes    Precautions: Standard    Subjective     Pt reports: He has started running and he has been having this weird bone pain at the bottom of his knee until he gets warmed up. He states it's completely different than the pain he used to have and it goes away around the mile marker. He states exercises don't bring it on.   He was compliant with home exercise program.  Response to previous treatment: good  Functional change: able to run longer distances pain free    Pain:  Current 0/10, worst 1/10, best 0/10   Location: right knee   Description: Aching and Tight    Objective     Sacha received therapeutic exercises to develop strength, endurance, ROM, flexibility, posture and core stabilization for 18 minutes including:  Bike 5 minutes level 10 for muscle endurance   Dynamic stretching 50 feet each; hamstring, quad, gastrocnemius, hip, hip rotators  Single Leg [place] Deadlift 3x10 10#   Eccentric Hip Flexor 3x15 with yoga ball deferred  Step Taps 3 way 8 inch 3x8  Mobo board 2x30 taps  Hip Flexor with Quad Stretch [id number] deferred  IT/TFL Stretch [id number] deferred    Sacha received the following manual therapy techniques: Joint mobilizations, Myofacial release and Soft tissue Mobilization were applied to the: right knee for 10 minutes, including:  Soft Tissue with roller to IT Band and TFL to decrease tension   IASTM to pes anserine       Sacha participated in neuromuscular re-education activities to improve: Balance, Coordination, Sense and Proprioception for 16 minutes. The following activities were included:  Monster walks green theraband 5 laps of 25 ft  Bridges with Hamstring Curls on yoga ball 3x12  Lunge to single leg stance on bosu 3x10  Side plank with abduction 3x8 deferred     See EMR under Patient Instructions for exercises provided 8/17/2020.    Assessment     Patient did have slight tenderness to pes anserine that did have some relief following soft tissue and scrapping to the area. Patient was able to work through all exercises and motions today with no issues and felt great after treatment. Overall, patient tolerated treatment very well today.     Sacha is progressing well towards his goals.   Pt prognosis is Excellent.     Pt will continue to benefit from skilled outpatient physical therapy to address the deficits listed in the problem list box on initial evaluation, provide pt/family education and to maximize pt's level of independence in the home and community environment.     Pt's spiritual, cultural and educational needs considered and pt agreeable to plan of care and goals.     Anticipated barriers to physical therapy: none    Goals:  Short Term Goals: 4 weeks   1. Patient will improve knee flexion to 140 degrees in order to sit at desk. GOAL MET 9/18/2020  2. Patient will be independent with HEP in order to further progress and return to maximal function. GOAL MET 9/18/2020  3. Pain rating at Worst: 2/10 in order to progress towards increased independence with activity. GOAL MET 9/18/2020     Long Term Goals: 8 weeks   1. Patient will improve quadriceps and hamstrings strength to 5/5 in order to run without pain. PROGRESSING  2. Patient will improve glute med strength to 4+/5 in order to perform a step down with no knee valgus. PROGRESSING  3. Patient will improve transverse abdominis strength to 4+/5 in order to  workout. PROGRESSING  5. Patient will self report improvement to 15% limitation on the FOTO Knee Survey. PROGRESSING    Plan     Continue with physical therapy as planned.     Plan of care Certification: 8/17/2020 to 10/17/2020.     Initial: Outpatient Physical Therapy 2 times weekly for 8 weeks to include the following interventions: Gait Training, Manual Therapy, Moist Heat/ Ice, Neuromuscular Re-ed, Patient Education, Therapeutic Activites and Therapeutic Exercise.     Santy Urias, PT, DPT

## 2020-10-05 ENCOUNTER — CLINICAL SUPPORT (OUTPATIENT)
Dept: REHABILITATION | Facility: HOSPITAL | Age: 40
End: 2020-10-05
Payer: OTHER GOVERNMENT

## 2020-10-05 DIAGNOSIS — R29.898 RIGHT LEG WEAKNESS: ICD-10-CM

## 2020-10-05 PROCEDURE — 97110 THERAPEUTIC EXERCISES: CPT | Mod: CQ

## 2020-10-05 PROCEDURE — 97112 NEUROMUSCULAR REEDUCATION: CPT | Mod: CQ

## 2020-10-05 PROCEDURE — 97140 MANUAL THERAPY 1/> REGIONS: CPT | Mod: CQ

## 2020-10-05 NOTE — PROGRESS NOTES
Physical Therapy Treatment Note     Name: Sacha Fregoso  Clinic Number: [id number]    Therapy Diagnosis:   Encounter Diagnosis   Name Primary?    Right leg weakness      Physician: Mary Bocanegra MD    Visit Date: 10/5/2020    Physician Orders: PT Eval and Treat   Medical Diagnosis from Referral: M76.31 (ICD-10-CM) - Iliotibial band syndrome of right side  Evaluation Date: 8/17/2020  Authorization Period Expiration: 12/9/2020  Plan of Care Expiration: 10/17/2020  Visit # / Visits authorized: 10/12    Time In: 8:00am   Time Out: 8:50am  Total Billable Time: 50 minutes    Precautions: Standard    Subjective     Pt reports: He did run 4 miles this weekend and he is still having that annoying feeling within the first mile. He did okay otherwise and he is not having any pain today.   He was compliant with home exercise program.  Response to previous treatment: good  Functional change: able to run longer distances pain free    Pain:  Current 0/10, worst 1/10, best 0/10   Location: right knee   Description: Aching and Tight    Objective     Sacha received therapeutic exercises to develop strength, endurance, ROM, flexibility, posture and core stabilization for 20 minutes including:  Bike 5 minutes level 10 for muscle endurance   Dynamic stretching 50 feet each; hamstring, quad, gastrocnemius, hip, hip rotators  Single Leg [place] Deadlift 3x10 10# deferred  Eccentric Hip Flexor 3x15 with yoga ball  Step Taps 3 way 8 inch 3x8  Mobo board 2x30 taps deferred  Hip Flexor with Quad Stretch 2t13yems   IT/TFL Stretch 6x81umfv deferred  Wall clams with band green 3x10    Sacha received the following manual therapy techniques: Joint mobilizations, Myofacial release and Soft tissue Mobilization were applied to the: right knee for 10 minutes, including:  Soft Tissue with roller to IT Band and TFL to decrease tension   IASTM to pes kayleigh Goncalves participated in neuromuscular re-education activities to  improve: Balance, Coordination, Sense and Proprioception for 20 minutes. The following activities were included:  Monster walks green theraband forward and side to side 200 feet each  Jump downs 18 inch box 2x10  Line hops 2x30 fwd/back, side to side  C slides 2x10 Bilateral  Bridges with Hamstring Curls on yoga ball 3x12 deferred  Lunge to single leg stance on bosu 3x10 deferred  Side plank with abduction 3x8 deferred     See EMR under Patient Instructions for exercises provided 8/17/2020.    Assessment     Patient tolerated treatment well today with minimal complaints of discomfort, good fatigue noted throughout treatment. Noted increased tightness and tenderness in lateral portion of the knee into IT band which decreased with IASTM. New activities added today and patient responded well. Verbal cues necessary for knee alignment today during squatting activities. Patient did have some discomfort at the end of treatment due to increased fatigue, stating that it did not hurt, but he just notices it a little more. Patient left treatment with no reports of increased pain and good fatigue noted.     Sacha is progressing well towards his goals.   Pt prognosis is Excellent.     Pt will continue to benefit from skilled outpatient physical therapy to address the deficits listed in the problem list box on initial evaluation, provide pt/family education and to maximize pt's level of independence in the home and community environment.     Pt's spiritual, cultural and educational needs considered and pt agreeable to plan of care and goals.     Anticipated barriers to physical therapy: none    Goals:  Short Term Goals: 4 weeks   1. Patient will improve knee flexion to 140 degrees in order to sit at desk. GOAL MET 9/18/2020  2. Patient will be independent with HEP in order to further progress and return to maximal function. GOAL MET 9/18/2020  3. Pain rating at Worst: 2/10 in order to progress towards increased independence  with activity. GOAL MET 9/18/2020     Long Term Goals: 8 weeks   1. Patient will improve quadriceps and hamstrings strength to 5/5 in order to run without pain. PROGRESSING  2. Patient will improve glute med strength to 4+/5 in order to perform a step down with no knee valgus. PROGRESSING  3. Patient will improve transverse abdominis strength to 4+/5 in order to workout. PROGRESSING  5. Patient will self report improvement to 15% limitation on the FOTO Knee Survey. PROGRESSING    Plan     Continue with physical therapy as planned.     Plan of care Certification: 8/17/2020 to 10/17/2020.     Initial: Outpatient Physical Therapy 2 times weekly for 8 weeks to include the following interventions: Gait Training, Manual Therapy, Moist Heat/ Ice, Neuromuscular Re-ed, Patient Education, Therapeutic Activites and Therapeutic Exercise.     Rae Mak, PTA

## 2020-10-12 ENCOUNTER — CLINICAL SUPPORT (OUTPATIENT)
Dept: REHABILITATION | Facility: HOSPITAL | Age: 40
End: 2020-10-12
Payer: OTHER GOVERNMENT

## 2020-10-12 DIAGNOSIS — R29.898 RIGHT LEG WEAKNESS: ICD-10-CM

## 2020-10-12 PROCEDURE — 97112 NEUROMUSCULAR REEDUCATION: CPT

## 2020-10-12 PROCEDURE — 97110 THERAPEUTIC EXERCISES: CPT

## 2020-10-12 NOTE — PROGRESS NOTES
Physical Therapy Treatment Note     Name: Sacha Fregoso  Clinic Number: [id number]    Therapy Diagnosis:   Encounter Diagnosis   Name Primary?    Right leg weakness      Physician: Mary Bocanegra MD    Visit Date: 10/12/2020    Physician Orders: PT Eval and Treat   Medical Diagnosis from Referral: M76.31 (ICD-10-CM) - Iliotibial band syndrome of right side  Evaluation Date: 8/17/2020  Authorization Period Expiration: 12/9/2020  Plan of Care Expiration: 10/17/2020  Visit # / Visits authorized: 11/12    Time In: 8:30am   Time Out: 9:15am  Total Billable Time: 43 minutes    Precautions: Standard    Subjective     Pt reports: He has been feeling pretty good since increasing his darell. He states he notices when he does feel discomfort he can tell his darell is off and when he increases it, the pain decreases. He reports since increasing his darell his calves are a little more sore than usual.   He was compliant with home exercise program.  Response to previous treatment: good  Functional change: able to run longer distances pain free    Pain:  Current 0/10, worst 1/10, best 0/10   Location: right knee   Description: Aching and Tight    Objective     Sacha received therapeutic exercises to develop strength, endurance, ROM, flexibility, posture and core stabilization for 25 minutes including:  Bike 5 minutes level 10 for muscle endurance   Dynamic stretching 50 feet each; hamstring, quad, gastrocnemius, hip, hip rotators  Single Leg Tongan Deadlift 3x10 15#  Step Taps 3 way 8 inch 3x8  Bridges with Hamstring Curls on yoga ball 3x12   Lunge to single leg stance on bosu 3x10   Single Leg Heel Raises 4x15    Deferred   Hip Flexor with Quad Stretch [id number]   IT/TFL Stretch [id number]    Sacha received the following manual therapy techniques: Joint mobilizations, Myofacial release and Soft tissue Mobilization were applied to the: right knee for 0 minutes, including: deferred  Soft Tissue with roller to  IT Band and TFL to decrease tension   IASTM to pes anserine      Sacha participated in neuromuscular re-education activities to improve: Balance, Coordination, Sense and Proprioception for 18 minutes. The following activities were included:  Monster walks green theraband forward and side to side 200 feet each  Wall clams with band green 3x10  Mobo board 2x30 taps     Deferred  Jump downs 18 inch box 2x10  Line hops 2x30 fwd/back, side to side  C slides 2x10 Bilateral     See EMR under Patient Instructions for exercises provided 8/17/2020.    Assessment     Patient was able to perform all exercises today with no discomfort and decreased fatigue. Patient educated on how he may get soreness in new areas, like his calves, since increasing darell but to keep stretching those areas too. Patient also educated on letting me know any new symptoms or sore areas to work on strengthening those areas as well. Heel raises were added to work on calf endurance. Overall, patient is progressing well and it on track to full return to running.     Sacha is progressing well towards his goals.   Pt prognosis is Excellent.     Pt will continue to benefit from skilled outpatient physical therapy to address the deficits listed in the problem list box on initial evaluation, provide pt/family education and to maximize pt's level of independence in the home and community environment.     Pt's spiritual, cultural and educational needs considered and pt agreeable to plan of care and goals.     Anticipated barriers to physical therapy: none    Goals:  Short Term Goals: 4 weeks   1. Patient will improve knee flexion to 140 degrees in order to sit at desk. GOAL MET 9/18/2020  2. Patient will be independent with HEP in order to further progress and return to maximal function. GOAL MET 9/18/2020  3. Pain rating at Worst: 2/10 in order to progress towards increased independence with activity. GOAL MET 9/18/2020     Long Term Goals: 8 weeks    1. Patient will improve quadriceps and hamstrings strength to 5/5 in order to run without pain. PROGRESSING  2. Patient will improve glute med strength to 4+/5 in order to perform a step down with no knee valgus. PROGRESSING  3. Patient will improve transverse abdominis strength to 4+/5 in order to workout. PROGRESSING  5. Patient will self report improvement to 15% limitation on the FOTO Knee Survey. PROGRESSING    Plan     Continue with physical therapy as planned.     Plan of care Certification: 8/17/2020 to 10/17/2020.     Initial: Outpatient Physical Therapy 2 times weekly for 8 weeks to include the following interventions: Gait Training, Manual Therapy, Moist Heat/ Ice, Neuromuscular Re-ed, Patient Education, Therapeutic Activites and Therapeutic Exercise.     Santy Urias, PT, DPT

## 2020-10-16 ENCOUNTER — CLINICAL SUPPORT (OUTPATIENT)
Dept: REHABILITATION | Facility: HOSPITAL | Age: 40
End: 2020-10-16
Payer: OTHER GOVERNMENT

## 2020-10-16 DIAGNOSIS — R29.898 RIGHT LEG WEAKNESS: ICD-10-CM

## 2020-10-16 PROCEDURE — 97110 THERAPEUTIC EXERCISES: CPT

## 2020-10-16 PROCEDURE — 97140 MANUAL THERAPY 1/> REGIONS: CPT

## 2020-10-16 PROCEDURE — 97112 NEUROMUSCULAR REEDUCATION: CPT

## 2020-10-16 NOTE — PLAN OF CARE
Outpatient Therapy Updated Plan of Care     Visit Date: 10/16/2020  Name: Sacha Fregoso  Clinic Number: [id number]    Therapy Diagnosis:   Encounter Diagnosis   Name Primary?    Right leg weakness      Physician: Mary Bocanegra MD    Physician Orders: PT Eval and Treat   Medical Diagnosis from Referral: M76.31 (ICD-10-CM) - Iliotibial band syndrome of right side  Evaluation Date: 8/17/2020    Total Visits Received: 12  Cancelled Visits: 3  No Show Visits: 0    Current Certification Period:  8/17/2020 to 10/17/2020  Precautions:  none  Visits from Evaluation Date:  11  Functional Level Prior to Evaluation:  Unable to run due to pain    Subjective     Update: Pt reports: He still has some aches and pain every once in a while but overall it is getting better by the day.     Objective     Update: ROM    Right (degrees) Left (degrees)   Knee Flexion (140) 140 140   Knee Extension (0) 0 0     Strength    Right     Left   Gluteus Minh 4+/5 4+/5   Gluteus Medius 4+/5 4+/5   Psoas 5/5 5/5   Quadriceps 5/5 5/5   Hamstrings 5/5 5/5   Anterior Tibialis 5/5 5/5   Gastroc/Soleus 5/5 5/5   Transverse Abdominis 4/5 4/5         Assessment     Update: Patient demonstrates increase range of motion and muscle strength since beginning therapy. Patient still has deficits in muscle strength and muscle endurance that is limiting him from running long distances pain free. Patient would benefit from continued skilled physical therapy to increase muscle strength and muscle endurance in order to return to running fully.     Goals:  Short Term Goals: 4 weeks   1. Patient will improve knee flexion to 140 degrees in order to sit at desk. GOAL MET 9/18/2020  2. Patient will be independent with HEP in order to further progress and return to maximal function. GOAL MET 9/18/2020  3. Pain rating at Worst: 2/10 in order to progress towards increased independence with activity. GOAL MET 9/18/2020     Long Term Goals: 8 weeks   1. Patient  will improve quadriceps and hamstrings strength to 5/5 in order to run without pain. GOAL MET 10/16/2020  2. Patient will improve glute med strength to 4+/5 in order to perform a step down with no knee valgus. GOAL MET 10/16/2020  3. Patient will improve transverse abdominis strength to 4+/5 in order to workout. PROGRESSING  5. Patient will self report improvement to 15% limitation on the FOTO Knee Survey. PROGRESSING    Long Term Goal Status:   continue per initial plan of care.  Reasons for Recertification of Therapy:   Increase muscle strength and increase muscle endurance in order to return fully to all activities and running.     Plan     Updated Certification Period: 10/16/2020 to 11/16/2020  Recommended Treatment Plan: 2 times per week for 4 weeks: Electrical Stimulation IFC, Gait Training, Manual Therapy, Moist Heat/ Ice, Neuromuscular Re-ed, Patient Education, Therapeutic Activites and Therapeutic Exercise  Other Recommendations: none    Santy Urias, PT, DPT  10/16/2020      I CERTIFY THE NEED FOR THESE SERVICES FURNISHED UNDER THIS PLAN OF TREATMENT AND WHILE UNDER MY CARE    Physician's comments:        Physician's Signature: ___________________________________________________

## 2020-10-16 NOTE — PROGRESS NOTES
Physical Therapy Treatment Note     Name: Sacha Fregoso  Clinic Number: [id number]    Therapy Diagnosis:   Encounter Diagnosis   Name Primary?    Right leg weakness      Physician: Mary Bocanegra MD    Visit Date: 10/16/2020    Physician Orders: PT Eval and Treat   Medical Diagnosis from Referral: M76.31 (ICD-10-CM) - Iliotibial band syndrome of right side  Evaluation Date: 8/17/2020  Authorization Period Expiration: 12/9/2020  Plan of Care Expiration: 11/16/2020  Visit # / Visits authorized: 12/16    Time In: 7:55am   Time Out: 8:45am  Total Billable Time: 46 minutes    Precautions: Standard    Subjective     Pt reports: He still has some aches and pain every once in a while but overall it is getting better by the day. He reports he hasn't been able to run as much as he would like this week so hopefully he will be able to more this weekend to get abetter idea of where he is.   He was compliant with home exercise program.  Response to previous treatment: good  Functional change: able to run longer distances pain free    Pain:  Current 0/10, worst 1/10, best 0/10   Location: right knee   Description: Aching and Tight    Objective     Sacha received therapeutic exercises to develop strength, endurance, ROM, flexibility, posture and core stabilization for 20 minutes including:  Bike 5 minutes level 10 for muscle endurance   Dynamic stretching 50 feet each; hamstring, quad, gastrocnemius, hip, hip rotators  Single Leg Czech Deadlift 3x10 15# deferred  Step Taps 12 inch 3x10  Bridges with Hamstring Curls 3x20 with sliders   Lunge to single leg stance on bosu 3x10   Single Leg Heel Raises 4x15    Deferred   Hip Flexor with Quad Stretch [id number]   IT/TFL Stretch [id number]    Sacha received the following manual therapy techniques: Joint mobilizations, Myofacial release and Soft tissue Mobilization were applied to the: right knee for 10 minutes, including:   Soft Tissue with roller to IT Band and TFL to  decrease tension   IASTM to pes anserine, IT band, and lateral hamstring     Sacha participated in neuromuscular re-education activities to improve: Balance, Coordination, Sense and Proprioception for 16 minutes. The following activities were included:  Monster walks green theraband forward and side to side 200 feet each  Wall clams with band green 3x10  Mobo board 2x30 taps   Side to Side Leon Hops 8inch 3x20     See EMR under Patient Instructions for exercises provided 8/17/2020.    Assessment     A separate note was done today to update plan of care and progress. Step taps were progressed to a higher step today to work through a greater range of motion and hamstring curls were performed with the sliders to work a quicker motion. IASTM was performed to the IT and and lateral hamstring following treatment and patient had complete relief in his symptoms he was having on the outside of his thigh and knee. Patient tolerated today's treatment very well and he is still progressing each visit.    Sacha is progressing well towards his goals.   Pt prognosis is Excellent.     Pt will continue to benefit from skilled outpatient physical therapy to address the deficits listed in the problem list box on initial evaluation, provide pt/family education and to maximize pt's level of independence in the home and community environment.     Pt's spiritual, cultural and educational needs considered and pt agreeable to plan of care and goals.     Anticipated barriers to physical therapy: none    Goals:  Short Term Goals: 4 weeks   1. Patient will improve knee flexion to 140 degrees in order to sit at desk. GOAL MET 9/18/2020  2. Patient will be independent with HEP in order to further progress and return to maximal function. GOAL MET 9/18/2020  3. Pain rating at Worst: 2/10 in order to progress towards increased independence with activity. GOAL MET 9/18/2020     Long Term Goals: 8 weeks   1. Patient will improve quadriceps  and hamstrings strength to 5/5 in order to run without pain. GOAL MET 10/16/2020  2. Patient will improve glute med strength to 4+/5 in order to perform a step down with no knee valgus. GOAL MET 10/16/2020  3. Patient will improve transverse abdominis strength to 4+/5 in order to workout. PROGRESSING  5. Patient will self report improvement to 15% limitation on the FOTO Knee Survey. PROGRESSING    Plan     Continue with physical therapy as planned.     Updated Certification Period: 10/16/2020 to 11/16/2020  Recommended Treatment Plan: 2 times per week for 4 weeks: Electrical Stimulation IFC, Gait Training, Manual Therapy, Moist Heat/ Ice, Neuromuscular Re-ed, Patient Education, Therapeutic Activites and Therapeutic Exercise     Santy Urias, PT, DPT

## 2020-10-19 ENCOUNTER — CLINICAL SUPPORT (OUTPATIENT)
Dept: REHABILITATION | Facility: HOSPITAL | Age: 40
End: 2020-10-19
Payer: OTHER GOVERNMENT

## 2020-10-19 DIAGNOSIS — R29.898 RIGHT LEG WEAKNESS: ICD-10-CM

## 2020-10-19 PROCEDURE — 97112 NEUROMUSCULAR REEDUCATION: CPT

## 2020-10-19 PROCEDURE — 97110 THERAPEUTIC EXERCISES: CPT

## 2020-10-19 NOTE — PROGRESS NOTES
Physical Therapy Treatment Note     Name: Sacha Fregoso  Clinic Number: [id number]    Therapy Diagnosis:   Encounter Diagnosis   Name Primary?    Right leg weakness      Physician: Mary Bocanegra MD    Visit Date: 10/19/2020    Physician Orders: PT Eval and Treat   Medical Diagnosis from Referral: M76.31 (ICD-10-CM) - Iliotibial band syndrome of right side  Evaluation Date: 8/17/2020  Authorization Period Expiration: 12/9/2020  Plan of Care Expiration: 11/16/2020  Visit # / Visits authorized: 13/16    Time In: 8:30am   Time Out: 9:15am  Total Billable Time: 41 minutes    Precautions: Standard    Subjective     Pt reports: His knee is feeling great today. He states he ran 6 miles around the [employer] this weekend and didn't have any pain. He states he still have aches from focusing more on his run. He also states he recovered really well after the run.   He was compliant with home exercise program.  Response to previous treatment: good  Functional change: able to run longer distances pain free    Pain:  Current 0/10, worst 1/10, best 0/10   Location: right knee   Description: Aching and Tight    Objective     Sacha received therapeutic exercises to develop strength, endurance, ROM, flexibility, posture and core stabilization for 24 minutes including:  Bike 5 minutes level 10 for muscle endurance   Dynamic stretching 50 feet each; hamstring, quad, gastrocnemius, hip, hip rotators  Single Leg Romansh Deadlift 3x10 15#   Step Taps 12 inch 3x10  Bridges with Hamstring Curls 3x20 with sliders   Lunge back leg on stool, front on BOSU 3x10  Single Leg Heel Raises 4x15    Deferred   Hip Flexor with Quad Stretch [id number]   IT/TFL Stretch [id number]    Sacha received the following manual therapy techniques: Joint mobilizations, Myofacial release and Soft tissue Mobilization were applied to the: right knee for 0 minutes, including: deferred  Soft Tissue with roller to IT Band and TFL to decrease tension   IASTM to  pes anserine, IT band, and lateral hamstring     Sacha participated in neuromuscular re-education activities to improve: Balance, Coordination, Sense and Proprioception for 17 minutes. The following activities were included:  Monster walks green theraband forward and side to side 200 feet each  Wall clams with band green 3x10  Mobo board 2x30 taps   Side to Side Leon Hops 8inch 3x20     See EMR under Patient Instructions for exercises provided 8/17/2020.    Assessment     Lunges were progressed to make them more challenging by making them more unstable. Patient tolerated today's treatment well. It was discussed with patient that now would be a good time to ease back into the 20 mile a week since he has been around 12 to 15 the last 3 weeks. Patient had a good understanding of the running plan going forward and is happy with the progress thus far.     Sacha is progressing well towards his goals.   Pt prognosis is Excellent.     Pt will continue to benefit from skilled outpatient physical therapy to address the deficits listed in the problem list box on initial evaluation, provide pt/family education and to maximize pt's level of independence in the home and community environment.     Pt's spiritual, cultural and educational needs considered and pt agreeable to plan of care and goals.     Anticipated barriers to physical therapy: none    Goals:  Short Term Goals: 4 weeks   1. Patient will improve knee flexion to 140 degrees in order to sit at desk. GOAL MET 9/18/2020  2. Patient will be independent with HEP in order to further progress and return to maximal function. GOAL MET 9/18/2020  3. Pain rating at Worst: 2/10 in order to progress towards increased independence with activity. GOAL MET 9/18/2020     Long Term Goals: 8 weeks   1. Patient will improve quadriceps and hamstrings strength to 5/5 in order to run without pain. GOAL MET 10/16/2020  2. Patient will improve glute med strength to 4+/5 in order  to perform a step down with no knee valgus. GOAL MET 10/16/2020  3. Patient will improve transverse abdominis strength to 4+/5 in order to workout. PROGRESSING  5. Patient will self report improvement to 15% limitation on the FOTO Knee Survey. PROGRESSING    Plan     Continue with physical therapy as planned.     Updated Certification Period: 10/16/2020 to 11/16/2020  Recommended Treatment Plan: 2 times per week for 4 weeks: Electrical Stimulation IFC, Gait Training, Manual Therapy, Moist Heat/ Ice, Neuromuscular Re-ed, Patient Education, Therapeutic Activites and Therapeutic Exercise     Santy Urias, PT, DPT

## 2020-10-23 ENCOUNTER — CLINICAL SUPPORT (OUTPATIENT)
Dept: REHABILITATION | Facility: HOSPITAL | Age: 40
End: 2020-10-23
Payer: OTHER GOVERNMENT

## 2020-10-23 DIAGNOSIS — R29.898 RIGHT LEG WEAKNESS: ICD-10-CM

## 2020-10-23 PROCEDURE — 97110 THERAPEUTIC EXERCISES: CPT

## 2020-10-23 PROCEDURE — 97112 NEUROMUSCULAR REEDUCATION: CPT

## 2020-10-23 NOTE — PROGRESS NOTES
Physical Therapy Treatment Note     Name: Sacha Fregoso  Clinic Number: [id number]    Therapy Diagnosis:   Encounter Diagnosis   Name Primary?    Right leg weakness      Physician: Mary Bocanegra MD    Visit Date: 10/23/2020    Physician Orders: PT Eval and Treat   Medical Diagnosis from Referral: M76.31 (ICD-10-CM) - Iliotibial band syndrome of right side  Evaluation Date: 8/17/2020  Authorization Period Expiration: 12/9/2020  Plan of Care Expiration: 11/16/2020  Visit # / Visits authorized: 14/16    Time In: 8:00am   Time Out: 8:45am  Total Billable Time: 42 minutes    Precautions: Standard    Subjective     Pt reports: He just went for a run this morning and his knee felt great.   He was compliant with home exercise program.  Response to previous treatment: good  Functional change: able to run longer distances pain free    Pain:  Current 0/10, worst 1/10, best 0/10   Location: right knee   Description: Aching and Tight    Objective     Sacha received therapeutic exercises to develop strength, endurance, ROM, flexibility, posture and core stabilization for 26 minutes including:  Bike 5 minutes level 10 for muscle endurance deferred  Dynamic stretching 50 feet each; hamstring, quad, gastrocnemius, hip, hip rotators  Single Leg Frisian Deadlift 3x10 15#   Step Taps 12 inch 3x10  Bridges with Hamstring Curls 3x20 with sliders   Lunge back leg on stool, front on BOSU 3x10  Single Leg Heel Raises 4x15    Deferred   Hip Flexor with Quad Stretch [id number]   IT/TFL Stretch [id number]    Sacha received the following manual therapy techniques: Joint mobilizations, Myofacial release and Soft tissue Mobilization were applied to the: right knee for 0 minutes, including: deferred  Soft Tissue with roller to IT Band and TFL to decrease tension   IASTM to pes anserine, IT band, and lateral hamstring     Sacha participated in neuromuscular re-education activities to improve: Balance, Coordination, Sense and  Proprioception for 16 minutes. The following activities were included:  Monster walks green theraband forward and side to side 200 feet each  Wall clams with band green 3x10  Mobo board 2x30 taps   Side to Side Leon Hops 8inch 3x20 deferred     See EMR under Patient Instructions for exercises provided 10/23/2020.    Assessment     Patient was provided a home exercise program consisting of most exercises we have done in therapy for him to start working on at home during the week. Patient is progressing well and and no longer has any knee pain with activities. Patient's dynamic balance was challenged by performing deadlift on the BOSU today. Overall, patient tolerated today's treatment very well with no pain or discomfort in his knee.     Sacha is progressing well towards his goals.   Pt prognosis is Excellent.     Pt will continue to benefit from skilled outpatient physical therapy to address the deficits listed in the problem list box on initial evaluation, provide pt/family education and to maximize pt's level of independence in the home and community environment.     Pt's spiritual, cultural and educational needs considered and pt agreeable to plan of care and goals.     Anticipated barriers to physical therapy: none    Goals:  Short Term Goals: 4 weeks   1. Patient will improve knee flexion to 140 degrees in order to sit at desk. GOAL MET 9/18/2020  2. Patient will be independent with HEP in order to further progress and return to maximal function. GOAL MET 9/18/2020  3. Pain rating at Worst: 2/10 in order to progress towards increased independence with activity. GOAL MET 9/18/2020     Long Term Goals: 8 weeks   1. Patient will improve quadriceps and hamstrings strength to 5/5 in order to run without pain. GOAL MET 10/16/2020  2. Patient will improve glute med strength to 4+/5 in order to perform a step down with no knee valgus. GOAL MET 10/16/2020  3. Patient will improve transverse abdominis strength to  4+/5 in order to workout. PROGRESSING  5. Patient will self report improvement to 15% limitation on the FOTO Knee Survey. PROGRESSING    Plan     Continue with physical therapy as planned. Move down to once a week for the next 2 weeks.     Updated Certification Period: 10/16/2020 to 11/16/2020  Recommended Treatment Plan: 2 times per week for 4 weeks: Electrical Stimulation IFC, Gait Training, Manual Therapy, Moist Heat/ Ice, Neuromuscular Re-ed, Patient Education, Therapeutic Activites and Therapeutic Exercise     Santy Urias, PT, DPT

## 2020-10-30 ENCOUNTER — CLINICAL SUPPORT (OUTPATIENT)
Dept: REHABILITATION | Facility: HOSPITAL | Age: 40
End: 2020-10-30
Payer: OTHER GOVERNMENT

## 2020-10-30 DIAGNOSIS — R29.898 RIGHT LEG WEAKNESS: ICD-10-CM

## 2020-10-30 PROCEDURE — 97112 NEUROMUSCULAR REEDUCATION: CPT

## 2020-10-30 PROCEDURE — 97110 THERAPEUTIC EXERCISES: CPT

## 2020-10-30 NOTE — PROGRESS NOTES
Physical Therapy Treatment Note     Name: Sacha Fregoso  Clinic Number: [id number]    Therapy Diagnosis:   Encounter Diagnosis   Name Primary?    Right leg weakness      Physician: Mary Bocanegra MD    Visit Date: 10/30/2020    Physician Orders: PT Eval and Treat   Medical Diagnosis from Referral: M76.31 (ICD-10-CM) - Iliotibial band syndrome of right side  Evaluation Date: 8/17/2020  Authorization Period Expiration: 12/9/2020  Plan of Care Expiration: 11/16/2020  Visit # / Visits authorized: 15/16    Time In: 8:00am   Time Out: 8:50am  Total Billable Time: 44 minutes    Precautions: Standard    Subjective     Pt reports: His knee has been feeling great. He states he is now up to 15 miles a week and his knee hasn't bothered him.   He was compliant with home exercise program.  Response to previous treatment: good  Functional change: able to run longer distances pain free    Pain:  Current 0/10, worst 1/10, best 0/10   Location: right knee   Description: Aching and Tight    Objective     Sacha received therapeutic exercises to develop strength, endurance, ROM, flexibility, posture and core stabilization for 26 minutes including:  Bike 5 minutes level 10 for muscle endurance deferred  Dynamic stretching 50 feet each; hamstring, quad, gastrocnemius, hip, hip rotators  Single Leg [place] Deadlift 3x10 15# on BOSU  Step Taps 12 inch 3x10  Single Leg Bridges with Hamstring Curls on yoga ball 3x10   Lunge back leg on stool, front on BOSU 3x10    Deferred   Hip Flexor with Quad Stretch [id number]   IT/TFL Stretch [id number]    Sacha received the following manual therapy techniques: Joint mobilizations, Myofacial release and Soft tissue Mobilization were applied to the: right knee for 0 minutes, including: deferred  Soft Tissue with roller to IT Band and TFL to decrease tension   IASTM to pes anserine, IT band, and lateral hamstring     Sacha participated in neuromuscular re-education activities to  improve: Balance, Coordination, Sense and Proprioception for 18 minutes. The following activities were included:  Monster walks green theraband forward and side to side 200 feet each  Wall clams with band green 3x10  Mobo board 2x30 taps   Rebound Box Jumps from 20 inch Box to 12 inch Box 2x10  Single Leg Box Jumps 2x10 12 inch      See EMR under Patient Instructions for exercises provided 10/23/2020.    Assessment     Hamstring curls on the yoga ball were progressed to single leg for more stability challenge. Single leg box jumps and plyo box jumps were added today to work on explosion and rebound effect. Patient tolerated today's treatment very well with no knee discomfort.     Sacha is progressing well towards his goals.   Pt prognosis is Excellent.     Pt will continue to benefit from skilled outpatient physical therapy to address the deficits listed in the problem list box on initial evaluation, provide pt/family education and to maximize pt's level of independence in the home and community environment.     Pt's spiritual, cultural and educational needs considered and pt agreeable to plan of care and goals.     Anticipated barriers to physical therapy: none    Goals:  Short Term Goals: 4 weeks   1. Patient will improve knee flexion to 140 degrees in order to sit at desk. GOAL MET 9/18/2020  2. Patient will be independent with HEP in order to further progress and return to maximal function. GOAL MET 9/18/2020  3. Pain rating at Worst: 2/10 in order to progress towards increased independence with activity. GOAL MET 9/18/2020     Long Term Goals: 8 weeks   1. Patient will improve quadriceps and hamstrings strength to 5/5 in order to run without pain. GOAL MET 10/16/2020  2. Patient will improve glute med strength to 4+/5 in order to perform a step down with no knee valgus. GOAL MET 10/16/2020  3. Patient will improve transverse abdominis strength to 4+/5 in order to workout. PROGRESSING  5. Patient will self  report improvement to 15% limitation on the FOTO Knee Survey. PROGRESSING    Plan     Continue with physical therapy as planned. Move down to once a week for the next 2 weeks.     Updated Certification Period: 10/16/2020 to 11/16/2020  Recommended Treatment Plan: 2 times per week for 4 weeks: Electrical Stimulation IFC, Gait Training, Manual Therapy, Moist Heat/ Ice, Neuromuscular Re-ed, Patient Education, Therapeutic Activites and Therapeutic Exercise     Santy Urias, PT, DPT

## 2020-11-06 ENCOUNTER — CLINICAL SUPPORT (OUTPATIENT)
Dept: REHABILITATION | Facility: HOSPITAL | Age: 40
End: 2020-11-06
Payer: OTHER GOVERNMENT

## 2020-11-06 DIAGNOSIS — R29.898 RIGHT LEG WEAKNESS: ICD-10-CM

## 2020-11-06 PROCEDURE — 97112 NEUROMUSCULAR REEDUCATION: CPT

## 2020-11-06 PROCEDURE — 97110 THERAPEUTIC EXERCISES: CPT

## 2020-11-06 NOTE — PLAN OF CARE
Outpatient Therapy Discharge Summary     Name: Sacha Fregoso  Clinic Number: [id number]    Therapy Diagnosis:   Encounter Diagnosis   Name Primary?    Right leg weakness      Physician: Mary Bocanegra MD    Physician Orders: PT Eval and Treat   Medical Diagnosis from Referral: M76.31 (ICD-10-CM) - Iliotibial band syndrome of right side  Evaluation Date: 8/17/2020    Date of Last visit: 11/6/2020  Total Visits Received: 16  Cancelled Visits: 5  No Show Visits: 0    Assessment      Strength    Right     Left   Gluteus Minh 4+/5 4+/5   Gluteus Medius 4+/5 4+/5   Psoas 5/5 5/5   Quadriceps 5/5 5/5   Hamstrings 5/5 5/5   Anterior Tibialis 5/5 5/5   Gastroc/Soleus 5/5 5/5   Transverse Abdominis 4+/5 4+/5             Goals:  Short Term Goals: 4 weeks   1. Patient will improve knee flexion to 140 degrees in order to sit at desk. GOAL MET 9/18/2020  2. Patient will be independent with HEP in order to further progress and return to maximal function. GOAL MET 9/18/2020  3. Pain rating at Worst: 2/10 in order to progress towards increased independence with activity. GOAL MET 9/18/2020     Long Term Goals: 8 weeks   1. Patient will improve quadriceps and hamstrings strength to 5/5 in order to run without pain. GOAL MET 10/16/2020  2. Patient will improve glute med strength to 4+/5 in order to perform a step down with no knee valgus. GOAL MET 10/16/2020  3. Patient will improve transverse abdominis strength to 4+/5 in order to workout.GOAL MET 11/6/2020  5. Patient will self report improvement to 15% limitation on the FOTO Knee Survey. GOAL MET 11/6/2020    Patient progressed very well through therapy. Patient is now back to running and is in preparation for doing a competition soon. Patient was consistent and compliant throughout his time in therapy.     Discharge reason: Patient has completed the physician's prescription, Patient is now asymptomatic, Patient has met all of his goals and Patient has completed  allowable visits authorized by insurance    Plan   This patient is discharged from Physical Therapy.

## 2020-11-06 NOTE — PROGRESS NOTES
Physical Therapy Treatment Note     Name: Sacha Fregoso  Clinic Number: [id number]    Therapy Diagnosis:   Encounter Diagnosis   Name Primary?    Right leg weakness      Physician: Mary Bocanegra MD    Visit Date: 11/6/2020    Physician Orders: PT Eval and Treat   Medical Diagnosis from Referral: M76.31 (ICD-10-CM) - Iliotibial band syndrome of right side  Evaluation Date: 8/17/2020  Authorization Period Expiration: 12/9/2020  Plan of Care Expiration: 11/16/2020  Visit # / Visits authorized: 16/16    Time In: 8:00am   Time Out: 8:50am  Total Billable Time: 45 minutes    Precautions: Standard    Subjective     Pt reports: His knee has been feeling good. He states he has added in all the exercises to his workouts during the week and he is still slowly adding to his weekly distance for running. He states he still gets sore but he doesn't have pain in his knee.   He was compliant with home exercise program.  Response to previous treatment: good  Functional change: able to run longer distances pain free    Pain:  Current 0/10, worst 0/10, best 0/10   Location: right knee   Description: Aching and Tight    Objective     Sacha received therapeutic exercises to develop strength, endurance, ROM, flexibility, posture and core stabilization for 27 minutes including:  Bike 5 minutes level 10 for muscle endurance deferred  Dynamic stretching 50 feet each; hamstring, quad, gastrocnemius, hip, hip rotators  Single Leg [place] Deadlift 3x10 15# on BOSU  Step Taps 12 inch 3x10  Single Leg Bridges with Hamstring Curls on yoga ball 3x10   Lunge back leg on stool, front on BOSU 3x10    Sacha participated in neuromuscular re-education activities to improve: Balance, Coordination, Sense and Proprioception for 18 minutes. The following activities were included:  Monster walks green theraband forward and side to side 200 feet each  Wall clams with band green 3x10  Mobo board 2x30 taps   Single Leg Box Jumps 2x10 12 inch       See EMR under Patient Instructions for exercises provided 10/23/2020.    Assessment     Patient tolerated today's treatment very well. Patient educated on slowly working more and more into longer distances and still keeping up with his workouts for maintenance. Patient demonstrates a good understanding of how to move forward and how to prevent his knee from flaring up in the future. Patient was advised if he does have any issues he can contact myself or Dr Bocanegra.     Sacha is progressing well towards his goals.   Pt prognosis is Excellent.     Pt will continue to benefit from skilled outpatient physical therapy to address the deficits listed in the problem list box on initial evaluation, provide pt/family education and to maximize pt's level of independence in the home and community environment.     Pt's spiritual, cultural and educational needs considered and pt agreeable to plan of care and goals.     Anticipated barriers to physical therapy: none    Goals:  Short Term Goals: 4 weeks   1. Patient will improve knee flexion to 140 degrees in order to sit at desk. GOAL MET 9/18/2020  2. Patient will be independent with HEP in order to further progress and return to maximal function. GOAL MET 9/18/2020  3. Pain rating at Worst: 2/10 in order to progress towards increased independence with activity. GOAL MET 9/18/2020     Long Term Goals: 8 weeks   1. Patient will improve quadriceps and hamstrings strength to 5/5 in order to run without pain. GOAL MET 10/16/2020  2. Patient will improve glute med strength to 4+/5 in order to perform a step down with no knee valgus. GOAL MET 10/16/2020  3. Patient will improve transverse abdominis strength to 4+/5 in order to workout.GOAL MET 11/6/2020  5. Patient will self report improvement to 15% limitation on the FOTO Knee Survey. GOAL MET 11/6/2020    Plan     Patient is being discharged from therapy. Patient will follow up with physician as needed.     Updated  Certification Period: 10/16/2020 to 11/16/2020  Recommended Treatment Plan: 2 times per week for 4 weeks: Electrical Stimulation IFC, Gait Training, Manual Therapy, Moist Heat/ Ice, Neuromuscular Re-ed, Patient Education, Therapeutic Activites and Therapeutic Exercise     Santy Urias, PT, DPT

## 2021-01-04 ENCOUNTER — LAB VISIT (OUTPATIENT)
Dept: PRIMARY CARE CLINIC | Facility: OTHER | Age: 41
End: 2021-01-04
Attending: INTERNAL MEDICINE
Payer: OTHER GOVERNMENT

## 2021-01-04 DIAGNOSIS — Z03.818 ENCOUNTER FOR OBSERVATION FOR SUSPECTED EXPOSURE TO OTHER BIOLOGICAL AGENTS RULED OUT: Primary | ICD-10-CM

## 2021-01-04 PROCEDURE — U0003 INFECTIOUS AGENT DETECTION BY NUCLEIC ACID (DNA OR RNA); SEVERE ACUTE RESPIRATORY SYNDROME CORONAVIRUS 2 (SARS-COV-2) (CORONAVIRUS DISEASE [COVID-19]), AMPLIFIED PROBE TECHNIQUE, MAKING USE OF HIGH THROUGHPUT TECHNOLOGIES AS DESCRIBED BY CMS-2020-01-R: HCPCS

## 2021-01-05 LAB — SARS-COV-2 RNA RESP QL NAA+PROBE: NOT DETECTED

## 2021-04-28 ENCOUNTER — PATIENT MESSAGE (OUTPATIENT)
Dept: RESEARCH | Facility: HOSPITAL | Age: 41
End: 2021-04-28

## 2021-09-13 ENCOUNTER — LAB VISIT (OUTPATIENT)
Dept: LAB | Facility: HOSPITAL | Age: 41
End: 2021-09-13
Attending: FAMILY MEDICINE
Payer: OTHER GOVERNMENT

## 2021-09-13 ENCOUNTER — OFFICE VISIT (OUTPATIENT)
Dept: PRIMARY CARE CLINIC | Facility: CLINIC | Age: 41
End: 2021-09-13
Payer: OTHER GOVERNMENT

## 2021-09-13 DIAGNOSIS — Z12.5 PROSTATE CANCER SCREENING: ICD-10-CM

## 2021-09-13 DIAGNOSIS — Z00.00 ROUTINE GENERAL MEDICAL EXAMINATION AT A HEALTH CARE FACILITY: ICD-10-CM

## 2021-09-13 DIAGNOSIS — K21.9 GASTROESOPHAGEAL REFLUX DISEASE, UNSPECIFIED WHETHER ESOPHAGITIS PRESENT: ICD-10-CM

## 2021-09-13 DIAGNOSIS — Z00.00 ROUTINE GENERAL MEDICAL EXAMINATION AT A HEALTH CARE FACILITY: Primary | ICD-10-CM

## 2021-09-13 DIAGNOSIS — M25.50 ARTHRALGIA, UNSPECIFIED JOINT: ICD-10-CM

## 2021-09-13 PROBLEM — K58.9 IBS (IRRITABLE BOWEL SYNDROME): Status: ACTIVE | Noted: 2021-09-13

## 2021-09-13 LAB
ERYTHROCYTE [SEDIMENTATION RATE] IN BLOOD BY WESTERGREN METHOD: 1 MM/HR (ref 0–10)
RHEUMATOID FACT SERPL-ACNC: <13 IU/ML (ref 0–15)

## 2021-09-13 PROCEDURE — 84439 ASSAY OF FREE THYROXINE: CPT | Performed by: FAMILY MEDICINE

## 2021-09-13 PROCEDURE — 84153 ASSAY OF PSA TOTAL: CPT | Performed by: FAMILY MEDICINE

## 2021-09-13 PROCEDURE — 99396 PREV VISIT EST AGE 40-64: CPT | Mod: 95,,, | Performed by: FAMILY MEDICINE

## 2021-09-13 PROCEDURE — 84443 ASSAY THYROID STIM HORMONE: CPT | Performed by: FAMILY MEDICINE

## 2021-09-13 PROCEDURE — 99396 PR PREVENTIVE VISIT,EST,40-64: ICD-10-PCS | Mod: 95,,, | Performed by: FAMILY MEDICINE

## 2021-09-13 PROCEDURE — 86200 CCP ANTIBODY: CPT | Performed by: FAMILY MEDICINE

## 2021-09-13 PROCEDURE — 86140 C-REACTIVE PROTEIN: CPT | Performed by: FAMILY MEDICINE

## 2021-09-13 PROCEDURE — 84550 ASSAY OF BLOOD/URIC ACID: CPT | Performed by: FAMILY MEDICINE

## 2021-09-13 PROCEDURE — 80053 COMPREHEN METABOLIC PANEL: CPT | Performed by: FAMILY MEDICINE

## 2021-09-13 PROCEDURE — 85651 RBC SED RATE NONAUTOMATED: CPT | Performed by: FAMILY MEDICINE

## 2021-09-13 PROCEDURE — 36415 COLL VENOUS BLD VENIPUNCTURE: CPT | Mod: PN | Performed by: FAMILY MEDICINE

## 2021-09-13 PROCEDURE — 86431 RHEUMATOID FACTOR QUANT: CPT | Performed by: FAMILY MEDICINE

## 2021-09-13 PROCEDURE — 86038 ANTINUCLEAR ANTIBODIES: CPT | Performed by: FAMILY MEDICINE

## 2021-09-13 PROCEDURE — 80061 LIPID PANEL: CPT | Performed by: FAMILY MEDICINE

## 2021-09-13 PROCEDURE — 85025 COMPLETE CBC W/AUTO DIFF WBC: CPT | Performed by: FAMILY MEDICINE

## 2021-09-13 PROCEDURE — 83036 HEMOGLOBIN GLYCOSYLATED A1C: CPT | Performed by: FAMILY MEDICINE

## 2021-09-13 RX ORDER — AZELASTINE 1 MG/ML
SPRAY, METERED NASAL
COMMUNITY
Start: 2021-06-02 | End: 2022-12-19

## 2021-09-13 RX ORDER — MELOXICAM 15 MG/1
15 TABLET ORAL DAILY PRN
Qty: 90 TABLET | Refills: 1 | Status: SHIPPED | OUTPATIENT
Start: 2021-09-13 | End: 2022-12-19

## 2021-09-13 RX ORDER — FAMOTIDINE 20 MG/1
20 TABLET, FILM COATED ORAL 2 TIMES DAILY
Qty: 180 TABLET | Refills: 3 | Status: SHIPPED | OUTPATIENT
Start: 2021-09-13 | End: 2022-12-19 | Stop reason: SDUPTHER

## 2021-09-14 LAB
ALBUMIN SERPL BCP-MCNC: 4.2 G/DL (ref 3.5–5.2)
ALP SERPL-CCNC: 59 U/L (ref 55–135)
ALT SERPL W/O P-5'-P-CCNC: 21 U/L (ref 10–44)
ANA SER QL IF: NORMAL
ANION GAP SERPL CALC-SCNC: 10 MMOL/L (ref 8–16)
AST SERPL-CCNC: 19 U/L (ref 10–40)
BASOPHILS # BLD AUTO: 0.03 K/UL (ref 0–0.2)
BASOPHILS NFR BLD: 0.7 % (ref 0–1.9)
BILIRUB SERPL-MCNC: 0.8 MG/DL (ref 0.1–1)
BUN SERPL-MCNC: 12 MG/DL (ref 6–20)
CALCIUM SERPL-MCNC: 9.3 MG/DL (ref 8.7–10.5)
CCP AB SER IA-ACNC: <0.5 U/ML
CHLORIDE SERPL-SCNC: 107 MMOL/L (ref 95–110)
CHOLEST SERPL-MCNC: 153 MG/DL (ref 120–199)
CHOLEST/HDLC SERPL: 3.4 {RATIO} (ref 2–5)
CO2 SERPL-SCNC: 24 MMOL/L (ref 23–29)
COMPLEXED PSA SERPL-MCNC: 0.6 NG/ML (ref 0–4)
CREAT SERPL-MCNC: 0.9 MG/DL (ref 0.5–1.4)
CRP SERPL-MCNC: <0.3 MG/L (ref 0–8.2)
DIFFERENTIAL METHOD: NORMAL
EOSINOPHIL # BLD AUTO: 0.2 K/UL (ref 0–0.5)
EOSINOPHIL NFR BLD: 4.3 % (ref 0–8)
ERYTHROCYTE [DISTWIDTH] IN BLOOD BY AUTOMATED COUNT: 12.7 % (ref 11.5–14.5)
EST. GFR  (AFRICAN AMERICAN): >60 ML/MIN/1.73 M^2
EST. GFR  (NON AFRICAN AMERICAN): >60 ML/MIN/1.73 M^2
ESTIMATED AVG GLUCOSE: 91 MG/DL (ref 68–131)
GLUCOSE SERPL-MCNC: 75 MG/DL (ref 70–110)
HBA1C MFR BLD: 4.8 % (ref 4–5.6)
HCT VFR BLD AUTO: 45.9 % (ref 40–54)
HDLC SERPL-MCNC: 45 MG/DL (ref 40–75)
HDLC SERPL: 29.4 % (ref 20–50)
HGB BLD-MCNC: 15.2 G/DL (ref 14–18)
IMM GRANULOCYTES # BLD AUTO: 0.01 K/UL (ref 0–0.04)
IMM GRANULOCYTES NFR BLD AUTO: 0.2 % (ref 0–0.5)
LDLC SERPL CALC-MCNC: 89.4 MG/DL (ref 63–159)
LYMPHOCYTES # BLD AUTO: 1.6 K/UL (ref 1–4.8)
LYMPHOCYTES NFR BLD: 34.7 % (ref 18–48)
MCH RBC QN AUTO: 29.6 PG (ref 27–31)
MCHC RBC AUTO-ENTMCNC: 33.1 G/DL (ref 32–36)
MCV RBC AUTO: 89 FL (ref 82–98)
MONOCYTES # BLD AUTO: 0.3 K/UL (ref 0.3–1)
MONOCYTES NFR BLD: 5.6 % (ref 4–15)
NEUTROPHILS # BLD AUTO: 2.4 K/UL (ref 1.8–7.7)
NEUTROPHILS NFR BLD: 54.5 % (ref 38–73)
NONHDLC SERPL-MCNC: 108 MG/DL
NRBC BLD-RTO: 0 /100 WBC
PLATELET # BLD AUTO: 185 K/UL (ref 150–450)
PMV BLD AUTO: 11.8 FL (ref 9.2–12.9)
POTASSIUM SERPL-SCNC: 4.8 MMOL/L (ref 3.5–5.1)
PROT SERPL-MCNC: 6.7 G/DL (ref 6–8.4)
RBC # BLD AUTO: 5.14 M/UL (ref 4.6–6.2)
SODIUM SERPL-SCNC: 141 MMOL/L (ref 136–145)
T4 FREE SERPL-MCNC: 0.88 NG/DL (ref 0.71–1.51)
TRIGL SERPL-MCNC: 93 MG/DL (ref 30–150)
TSH SERPL DL<=0.005 MIU/L-ACNC: 2.61 UIU/ML (ref 0.4–4)
URATE SERPL-MCNC: 4.6 MG/DL (ref 3.4–7)
WBC # BLD AUTO: 4.47 K/UL (ref 3.9–12.7)

## 2022-03-15 ENCOUNTER — PATIENT MESSAGE (OUTPATIENT)
Dept: PRIMARY CARE CLINIC | Facility: CLINIC | Age: 42
End: 2022-03-15
Payer: OTHER GOVERNMENT

## 2022-03-21 ENCOUNTER — CLINICAL SUPPORT (OUTPATIENT)
Dept: PRIMARY CARE CLINIC | Facility: CLINIC | Age: 42
End: 2022-03-21
Payer: OTHER GOVERNMENT

## 2022-03-21 PROCEDURE — 90715 TDAP VACCINE 7 YRS/> IM: CPT | Mod: PBBFAC,PN

## 2022-03-21 PROCEDURE — 99999 PR PBB SHADOW E&M-EST. PATIENT-LVL I: CPT | Mod: PBBFAC,,,

## 2022-03-21 PROCEDURE — 99999 PR PBB SHADOW E&M-EST. PATIENT-LVL I: ICD-10-PCS | Mod: PBBFAC,,,

## 2022-03-21 PROCEDURE — 99211 OFF/OP EST MAY X REQ PHY/QHP: CPT | Mod: PBBFAC,PN

## 2022-03-21 NOTE — PROGRESS NOTES
After obtaining consent, and per orders of Dr. Zuri Knowles, TDAP injection given by Brittnee Mcnally LPN. Patient instructed to remain in clinic for 20 minutes afterwards, and to report any adverse reaction to me immediately.    Brittnee Mcnally LPN

## 2022-12-19 ENCOUNTER — OFFICE VISIT (OUTPATIENT)
Dept: PRIMARY CARE CLINIC | Facility: CLINIC | Age: 42
End: 2022-12-19
Payer: OTHER GOVERNMENT

## 2022-12-19 ENCOUNTER — LAB VISIT (OUTPATIENT)
Dept: LAB | Facility: HOSPITAL | Age: 42
End: 2022-12-19
Attending: FAMILY MEDICINE
Payer: OTHER GOVERNMENT

## 2022-12-19 VITALS
SYSTOLIC BLOOD PRESSURE: 122 MMHG | DIASTOLIC BLOOD PRESSURE: 78 MMHG | HEIGHT: 71 IN | WEIGHT: 179.13 LBS | TEMPERATURE: 98 F | HEART RATE: 82 BPM | BODY MASS INDEX: 25.08 KG/M2

## 2022-12-19 DIAGNOSIS — Z00.00 ROUTINE GENERAL MEDICAL EXAMINATION AT A HEALTH CARE FACILITY: ICD-10-CM

## 2022-12-19 DIAGNOSIS — K21.9 GASTROESOPHAGEAL REFLUX DISEASE, UNSPECIFIED WHETHER ESOPHAGITIS PRESENT: ICD-10-CM

## 2022-12-19 DIAGNOSIS — H93.12 TINNITUS OF LEFT EAR: ICD-10-CM

## 2022-12-19 DIAGNOSIS — Z00.00 ROUTINE GENERAL MEDICAL EXAMINATION AT A HEALTH CARE FACILITY: Primary | ICD-10-CM

## 2022-12-19 DIAGNOSIS — K58.9 IRRITABLE BOWEL SYNDROME, UNSPECIFIED TYPE: ICD-10-CM

## 2022-12-19 DIAGNOSIS — Z77.9 HISTORY OF EXPOSURE TO TOXINS VIA INHALATION: ICD-10-CM

## 2022-12-19 DIAGNOSIS — K63.5 POLYP OF COLON, UNSPECIFIED PART OF COLON, UNSPECIFIED TYPE: ICD-10-CM

## 2022-12-19 DIAGNOSIS — Z12.11 COLON CANCER SCREENING: ICD-10-CM

## 2022-12-19 PROCEDURE — 80053 COMPREHEN METABOLIC PANEL: CPT | Performed by: FAMILY MEDICINE

## 2022-12-19 PROCEDURE — 36415 COLL VENOUS BLD VENIPUNCTURE: CPT | Mod: PN | Performed by: FAMILY MEDICINE

## 2022-12-19 PROCEDURE — 84153 ASSAY OF PSA TOTAL: CPT | Performed by: FAMILY MEDICINE

## 2022-12-19 PROCEDURE — 99213 OFFICE O/P EST LOW 20 MIN: CPT | Mod: PBBFAC,PN | Performed by: FAMILY MEDICINE

## 2022-12-19 PROCEDURE — 99999 PR PBB SHADOW E&M-EST. PATIENT-LVL III: ICD-10-PCS | Mod: PBBFAC,,, | Performed by: FAMILY MEDICINE

## 2022-12-19 PROCEDURE — 85025 COMPLETE CBC W/AUTO DIFF WBC: CPT | Performed by: FAMILY MEDICINE

## 2022-12-19 PROCEDURE — 83036 HEMOGLOBIN GLYCOSYLATED A1C: CPT | Performed by: FAMILY MEDICINE

## 2022-12-19 PROCEDURE — 99396 PR PREVENTIVE VISIT,EST,40-64: ICD-10-PCS | Mod: S$PBB,,, | Performed by: FAMILY MEDICINE

## 2022-12-19 PROCEDURE — 99999 PR PBB SHADOW E&M-EST. PATIENT-LVL III: CPT | Mod: PBBFAC,,, | Performed by: FAMILY MEDICINE

## 2022-12-19 PROCEDURE — 99396 PREV VISIT EST AGE 40-64: CPT | Mod: S$PBB,,, | Performed by: FAMILY MEDICINE

## 2022-12-19 PROCEDURE — 80061 LIPID PANEL: CPT | Performed by: FAMILY MEDICINE

## 2022-12-19 PROCEDURE — 84443 ASSAY THYROID STIM HORMONE: CPT | Performed by: FAMILY MEDICINE

## 2022-12-19 RX ORDER — FAMOTIDINE 20 MG/1
20 TABLET, FILM COATED ORAL 2 TIMES DAILY
Qty: 180 TABLET | Refills: 3 | Status: SHIPPED | OUTPATIENT
Start: 2022-12-19

## 2022-12-19 NOTE — PROGRESS NOTES
Subjective:      Patient ID: Sacha Fregoso is a 42 y.o. male.    Chief Complaint: Annual Exam      Sacha Fregoso is a 42 y.o. male presenting to clinic today for annual f/u.    Hx of GERD and seasonal allergies. Currently on Pepcid and Azelastine. Reports he caught the flu last month. Pt did receive flu shot 1 week ago due to shortage of flu shots in the . 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 He reports he has had ear ringing for the past years. Reports left ear has progressively lost hearing. Hx of ibs and colon polyps and gerd.   No other complaint at this time.     [facility] Reason For Visit    12/19/2022  8:55 AM CST - Filed by Patient   What is your primary reason for visit? Other/Annual   Have you experienced any of the following:   Change in activity?     Unexpected weight change?     Neck pain?     Hearing loss?     Runny nose?     Trouble swallowing?     Eye discharge?     Changes in vision?     Chest tightness?     Wheezing?     Chest pain?     Heart beating fast or racing?     Blood in stool?     Constipation?     Vomiting?     Diarrhea?     Drinking much more than usual?     Urinating much more than usual?     Difficulty urinating?     Have a sudden urge to urinate?     Blood in the urine?     Joint swelling?     Joint pain?     Headaches?     Weakness?     Confusion?     Feeling depressed?       [employer] Documents    12/19/2022  8:55 AM CST - Filed by Patient   Would you like a copy of [facility]'s Financial Assistance Policy Summary? No, I would not like a copy.   Would you like to receive more information regarding your rights and protections under the [place] Billing act? No, I would not.     [employer]    12/19/2022  8:58 AM CST - Filed by Patient   On average, how many days per week do you engage in moderate to strenuous exercise (like a brisk walk)? 5 days   On average, how many minutes do  you engage in exercise at this level? 60 min   Do you feel stress - tense, restless, nervous, or anxious, or unable to sleep at night because your mind is troubled all the time - these days? Not at all   Do you belong to any clubs or organizations such as Taoist groups, unions, fraternal or athletic groups, or school groups? Yes   How often do you attend meetings of the clubs or organizations you belong to? 1 to 4 times per year   In a typical week, how many times do you talk on the phone with family, friends, or neighbors? Once a week   How often do you get together with friends or relatives? Once a week   Are you , , , , never , or living with a partner?    How hard is it for you to pay for the very basics like food, housing, medical care, and heating? Not hard at all   Within the past 12 months, you worried that your food would run out before you got the money to buy more. Never true   Within the past 12 months, the food you bought just didnt last and you didnt have money to get more. Never true   In the past 12 months, has lack of transportation kept you from medical appointments or from getting medications? No   In the past 12 months, has lack of transportation kept you from meetings, work, or from getting things needed for daily living? No   How often do you have a drink containing alcohol? 2-4 times a month   How many drinks containing alcohol do you have on a typical day when you are drinking? 1 or 2   How often do you have six or more drinks on one occasion? Never   In the last 12 months, was there a time when you were not able to pay the mortgage or rent on time? No   In the last 12 months, how many places have you lived? (range: at least 0) 1   In the last 12 months, was there a time when you did not have a steady place to sleep or slept in a shelter (including now)? No         Pmh, Psh, Family Hx, Social Hx, HM updated in Epic Tabs today.   Review of Systems  "  Constitutional:  Negative for activity change and unexpected weight change.   HENT:  Negative for hearing loss, rhinorrhea and trouble swallowing.         + ear ringing   Eyes:  Negative for discharge and visual disturbance.   Respiratory:  Negative for chest tightness and wheezing.    Cardiovascular:  Negative for chest pain and palpitations.   Gastrointestinal:  Negative for blood in stool, constipation, diarrhea and vomiting.   Endocrine: Negative for polydipsia and polyuria.   Genitourinary:  Negative for difficulty urinating, hematuria and urgency.   Musculoskeletal:  Negative for arthralgias, joint swelling and neck pain.   Neurological:  Negative for weakness and headaches.   Psychiatric/Behavioral:  Negative for confusion and dysphoric mood.    Objective:     Vitals:    12/19/22 0904   BP: 122/78   Pulse: 82   Temp: 97.9 °F (36.6 °C)   TempSrc: Oral   Weight: 81.3 kg (179 lb 2 oz)   Height: 5' 11" (1.803 m)     Wt Readings from Last 10 Encounters:   12/19/22 81.3 kg (179 lb 2 oz)   10/02/20 83 kg (183 lb)   09/09/20 83 kg (183 lb)   08/10/20 83 kg (183 lb)   06/15/20 83.3 kg (183 lb 10.3 oz)   05/06/19 83.1 kg (183 lb 3.2 oz)   09/11/18 86.2 kg (190 lb)   06/14/18 86.2 kg (190 lb)   04/24/18 86.2 kg (190 lb)   04/19/18 87.5 kg (192 lb 14.4 oz)     Physical Exam  Vitals reviewed.   Constitutional:       General: He is not in acute distress.     Appearance: Normal appearance. He is well-developed and normal weight.   HENT:      Head: Normocephalic and atraumatic.      Right Ear: Tympanic membrane and external ear normal.      Left Ear: Tympanic membrane and external ear normal.      Nose: Nose normal.      Mouth/Throat:      Mouth: Mucous membranes are moist.      Pharynx: Oropharynx is clear.   Eyes:      Conjunctiva/sclera: Conjunctivae normal.      Pupils: Pupils are equal, round, and reactive to light.   Neck:      Thyroid: No thyromegaly.   Cardiovascular:      Rate and Rhythm: Normal rate and regular " rhythm.      Pulses: Normal pulses.      Heart sounds: Normal heart sounds. No murmur heard.    No friction rub. No gallop.   Pulmonary:      Effort: Pulmonary effort is normal. No respiratory distress.      Breath sounds: Normal breath sounds.   Abdominal:      General: Bowel sounds are normal. There is no distension.      Palpations: Abdomen is soft.      Tenderness: There is no abdominal tenderness. There is no rebound.   Musculoskeletal:         General: Normal range of motion.      Cervical back: Normal range of motion and neck supple.   Lymphadenopathy:      Cervical: No cervical adenopathy.   Skin:     General: Skin is warm and dry.   Neurological:      General: No focal deficit present.      Mental Status: He is alert and oriented to person, place, and time. Mental status is at baseline.      Coordination: Coordination normal.   Psychiatric:         Attention and Perception: Attention normal.         Mood and Affect: Mood and affect normal.         Speech: Speech normal.         Behavior: Behavior normal.         Thought Content: Thought content normal.         Cognition and Memory: Cognition normal.         Judgment: Judgment normal.     Assessment:     1. Routine general medical examination at a health care facility    2. Gastroesophageal reflux disease, unspecified whether esophagitis present    3. Tinnitus of left ear    4. History of exposure to toxins via inhalation    5. Polyp of colon, unspecified part of colon, unspecified type    6. Irritable bowel syndrome, unspecified type    7. Colon cancer screening        LABS:   Lab Results   Component Value Date    HGBA1C 4.8 09/13/2021    HGBA1C 4.8 06/15/2020    HGBA1C 4.8 05/06/2019      Lab Results   Component Value Date    CHOL 153 09/13/2021    CHOL 189 06/15/2020    CHOL 159 05/06/2019     Lab Results   Component Value Date    LDLCALC 89.4 09/13/2021    LDLCALC 110.8 06/15/2020    LDLCALC 91.6 05/06/2019     Lab Results   Component Value Date    WBC  4.47 09/13/2021    HGB 15.2 09/13/2021    HCT 45.9 09/13/2021     09/13/2021    CHOL 153 09/13/2021    TRIG 93 09/13/2021    HDL 45 09/13/2021    ALT 21 09/13/2021    AST 19 09/13/2021     09/13/2021    K 4.8 09/13/2021     09/13/2021    CREATININE 0.9 09/13/2021    BUN 12 09/13/2021    CO2 24 09/13/2021    TSH 2.612 09/13/2021    PSA 0.60 09/13/2021    HGBA1C 4.8 09/13/2021       The 10-year ASCVD risk score (Jaylen PADILLA, et al., 2019) is: 0.9%    Values used to calculate the score:      Age: 42 years      Sex: Male      Is Non- : No      Diabetic: No      Tobacco smoker: No      Systolic Blood Pressure: 122 mmHg      Is BP treated: No      HDL Cholesterol: 45 mg/dL      Total Cholesterol: 153 mg/dL    X-ray Knee Ortho Right with Flexion  Narrative: EXAMINATION:  XR KNEE ORTHO RIGHT WITH FLEXION    CLINICAL HISTORY:  Pain in right knee    TECHNIQUE:  Standing AP, standing PA flexion, and Merchant views were obtained of the bilateral knees.  Standing lateral view of the right knee was obtained.    COMPARISON:  None.    FINDINGS:  There is no radiographic evidence of acute osseous, articular, or soft tissue abnormality.  Small tricompartmental marginal osteophytes are present on the right with preserved joint spaces.  Similar findings noted involving the left knee.  Impression: As above.  No acute findings.    Electronically signed by: Dilshad Villatoro MD  Date:    08/10/2020  Time:    09:18      Plan:   Sacha was seen today for annual exam.    Diagnoses and all orders for this visit:    Routine general medical examination at a health care facility  Comments:  labs today. discsused hm issues. 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 stable.   Orders:  -     famotidine (PEPCID) 20 MG tablet; Take 1 tablet (20 mg total) by mouth 2 (two) times daily.    Tinnitus of left ear  Comments:  during deployments was exposed to high/loud sounds, [facility].     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 imaging and exposures to be monitored through [facility].     Polyp of colon, unspecified part of colon, unspecified type  -     Ambulatory referral/consult to Endo Procedure ; Future    Irritable bowel syndrome, unspecified type    Colon cancer screening  -     Ambulatory referral/consult to Endo Procedure ; Future      Lab work ordered to be completed today.  Refill Rx Pepcid 20 mg for GERD relief.   Imaging and exposures to burn pits and exposure to high pitch and loud sounds to be monitored by [facility] once pt retires.   Referral given to schedule colonoscopy in 2023.  Instructed to f/u in 1 year.     There are no Patient Instructions on file for this visit.    Follow up in about 1 year (around 12/19/2023) for physical with Dr AVENDANO.  Scribe Attestation:   I, Sathish To, am scribing for, and in the presence of, Dr. Zuri Avendano MD. I performed the above scribed service and the documentation accurately describes the services I performed. I attest to the accuracy of the note.    I, Dr. Zuri Avendano MD, reviewed documentation as scribed above. I personally performed the services described in this documentation.  I agree that the record reflects my personal performance and is accurate and complete. Zuri Avendano MD.    12/19/2022

## 2022-12-20 LAB
ALBUMIN SERPL BCP-MCNC: 4.3 G/DL (ref 3.5–5.2)
ALP SERPL-CCNC: 64 U/L (ref 55–135)
ALT SERPL W/O P-5'-P-CCNC: 35 U/L (ref 10–44)
ANION GAP SERPL CALC-SCNC: 10 MMOL/L (ref 8–16)
AST SERPL-CCNC: 23 U/L (ref 10–40)
BASOPHILS # BLD AUTO: 0.03 K/UL (ref 0–0.2)
BASOPHILS NFR BLD: 0.6 % (ref 0–1.9)
BILIRUB SERPL-MCNC: 1 MG/DL (ref 0.1–1)
BUN SERPL-MCNC: 16 MG/DL (ref 6–20)
CALCIUM SERPL-MCNC: 9.5 MG/DL (ref 8.7–10.5)
CHLORIDE SERPL-SCNC: 106 MMOL/L (ref 95–110)
CHOLEST SERPL-MCNC: 214 MG/DL (ref 120–199)
CHOLEST/HDLC SERPL: 3.8 {RATIO} (ref 2–5)
CO2 SERPL-SCNC: 25 MMOL/L (ref 23–29)
COMPLEXED PSA SERPL-MCNC: 0.8 NG/ML (ref 0–4)
CREAT SERPL-MCNC: 1 MG/DL (ref 0.5–1.4)
DIFFERENTIAL METHOD: NORMAL
EOSINOPHIL # BLD AUTO: 0.1 K/UL (ref 0–0.5)
EOSINOPHIL NFR BLD: 2.5 % (ref 0–8)
ERYTHROCYTE [DISTWIDTH] IN BLOOD BY AUTOMATED COUNT: 12.7 % (ref 11.5–14.5)
EST. GFR  (NO RACE VARIABLE): >60 ML/MIN/1.73 M^2
ESTIMATED AVG GLUCOSE: 91 MG/DL (ref 68–131)
GLUCOSE SERPL-MCNC: 77 MG/DL (ref 70–110)
HBA1C MFR BLD: 4.8 % (ref 4–5.6)
HCT VFR BLD AUTO: 46 % (ref 40–54)
HDLC SERPL-MCNC: 56 MG/DL (ref 40–75)
HDLC SERPL: 26.2 % (ref 20–50)
HGB BLD-MCNC: 15.3 G/DL (ref 14–18)
IMM GRANULOCYTES # BLD AUTO: 0.01 K/UL (ref 0–0.04)
IMM GRANULOCYTES NFR BLD AUTO: 0.2 % (ref 0–0.5)
LDLC SERPL CALC-MCNC: 137.8 MG/DL (ref 63–159)
LYMPHOCYTES # BLD AUTO: 2 K/UL (ref 1–4.8)
LYMPHOCYTES NFR BLD: 38 % (ref 18–48)
MCH RBC QN AUTO: 29.2 PG (ref 27–31)
MCHC RBC AUTO-ENTMCNC: 33.3 G/DL (ref 32–36)
MCV RBC AUTO: 88 FL (ref 82–98)
MONOCYTES # BLD AUTO: 0.4 K/UL (ref 0.3–1)
MONOCYTES NFR BLD: 7.6 % (ref 4–15)
NEUTROPHILS # BLD AUTO: 2.7 K/UL (ref 1.8–7.7)
NEUTROPHILS NFR BLD: 51.1 % (ref 38–73)
NONHDLC SERPL-MCNC: 158 MG/DL
NRBC BLD-RTO: 0 /100 WBC
PLATELET # BLD AUTO: 223 K/UL (ref 150–450)
PMV BLD AUTO: 11.6 FL (ref 9.2–12.9)
POTASSIUM SERPL-SCNC: 4.5 MMOL/L (ref 3.5–5.1)
PROT SERPL-MCNC: 7.1 G/DL (ref 6–8.4)
RBC # BLD AUTO: 5.24 M/UL (ref 4.6–6.2)
SODIUM SERPL-SCNC: 141 MMOL/L (ref 136–145)
TRIGL SERPL-MCNC: 101 MG/DL (ref 30–150)
TSH SERPL DL<=0.005 MIU/L-ACNC: 2.58 UIU/ML (ref 0.4–4)
WBC # BLD AUTO: 5.23 K/UL (ref 3.9–12.7)

## 2023-01-09 ENCOUNTER — HOSPITAL ENCOUNTER (OUTPATIENT)
Dept: PREADMISSION TESTING | Facility: HOSPITAL | Age: 43
Discharge: HOME | End: 2023-01-09
Payer: OTHER GOVERNMENT

## 2023-01-09 DIAGNOSIS — K63.5 POLYP OF COLON, UNSPECIFIED PART OF COLON, UNSPECIFIED TYPE: ICD-10-CM

## 2023-01-09 DIAGNOSIS — Z12.11 COLON CANCER SCREENING: Primary | ICD-10-CM

## 2023-08-04 ENCOUNTER — PATIENT MESSAGE (OUTPATIENT)
Dept: PRIMARY CARE CLINIC | Facility: CLINIC | Age: 43
End: 2023-08-04
Payer: OTHER GOVERNMENT

## 2023-11-30 ENCOUNTER — OFFICE VISIT (OUTPATIENT)
Dept: PRIMARY CARE CLINIC | Facility: CLINIC | Age: 43
End: 2023-11-30
Payer: OTHER GOVERNMENT

## 2023-11-30 VITALS
SYSTOLIC BLOOD PRESSURE: 124 MMHG | HEART RATE: 80 BPM | WEIGHT: 180.31 LBS | DIASTOLIC BLOOD PRESSURE: 80 MMHG | TEMPERATURE: 99 F | OXYGEN SATURATION: 97 % | RESPIRATION RATE: 18 BRPM | HEIGHT: 71 IN | BODY MASS INDEX: 25.24 KG/M2

## 2023-11-30 DIAGNOSIS — Z12.11 COLON CANCER SCREENING: ICD-10-CM

## 2023-11-30 DIAGNOSIS — Z12.5 PROSTATE CANCER SCREENING: ICD-10-CM

## 2023-11-30 DIAGNOSIS — M25.551 RIGHT HIP PAIN: ICD-10-CM

## 2023-11-30 DIAGNOSIS — G25.81 RESTLESS LEGS: ICD-10-CM

## 2023-11-30 DIAGNOSIS — M17.11 PRIMARY OSTEOARTHRITIS OF RIGHT KNEE: ICD-10-CM

## 2023-11-30 DIAGNOSIS — R35.1 NOCTURIA: ICD-10-CM

## 2023-11-30 DIAGNOSIS — Z00.01 ENCOUNTER FOR GENERAL ADULT MEDICAL EXAMINATION WITH ABNORMAL FINDINGS: Primary | ICD-10-CM

## 2023-11-30 DIAGNOSIS — H53.9 VISION CHANGES: ICD-10-CM

## 2023-11-30 DIAGNOSIS — F51.04 PSYCHOPHYSIOLOGICAL INSOMNIA: ICD-10-CM

## 2023-11-30 PROCEDURE — 99396 PR PREVENTIVE VISIT,EST,40-64: ICD-10-PCS | Mod: S$PBB,,, | Performed by: FAMILY MEDICINE

## 2023-11-30 PROCEDURE — 99214 OFFICE O/P EST MOD 30 MIN: CPT | Mod: S$PBB,25,, | Performed by: FAMILY MEDICINE

## 2023-11-30 PROCEDURE — 99999 PR PBB SHADOW E&M-EST. PATIENT-LVL V: ICD-10-PCS | Mod: PBBFAC,,, | Performed by: FAMILY MEDICINE

## 2023-11-30 PROCEDURE — 99214 PR OFFICE/OUTPT VISIT, EST, LEVL IV, 30-39 MIN: ICD-10-PCS | Mod: S$PBB,25,, | Performed by: FAMILY MEDICINE

## 2023-11-30 PROCEDURE — 99999 PR PBB SHADOW E&M-EST. PATIENT-LVL V: CPT | Mod: PBBFAC,,, | Performed by: FAMILY MEDICINE

## 2023-11-30 PROCEDURE — 99396 PREV VISIT EST AGE 40-64: CPT | Mod: S$PBB,,, | Performed by: FAMILY MEDICINE

## 2023-11-30 PROCEDURE — 99215 OFFICE O/P EST HI 40 MIN: CPT | Mod: PBBFAC,PN | Performed by: FAMILY MEDICINE

## 2023-11-30 NOTE — PROGRESS NOTES
Subjective:      Patient ID: Sacha Fregoso is a 43 y.o. male.    Chief Complaint: Annual Exam      Patient is a 43 y.o. male coming in today for annual exam.   States he is about a year away from  snf. Currently on Famotidine. Reports recent sleep disturbance with associated nocturia. Latest home sleep study shows no snoring. Reports using Melatonin in the past. Has not f/u with urology yet. He also reports intermittent knee pain flare-ups; states that know pain extends to his hips. Currently running for exercise. Pain worsens with movement. Has been working on stretching exercises. He is due for colon cancer screening.       1. Encounter for general adult medical examination with abnormal findings    2. Psychophysiological insomnia    3. Nocturia    4. Vision changes    5. Right hip pain    6. Restless legs    7. Colon cancer screening    8. Prostate cancer screening    9. Primary osteoarthritis of right knee       [facility] Reason For Visit    11/30/2023  2:14 PM CST - Filed by Patient   What is your primary reason for visit? Other/Annual   Have you experienced any of the following:   Change in activity? No   Unexpected weight change? No   Neck pain? No   Hearing loss? No   Runny nose? No   Trouble swallowing? No   Eye discharge? No   Changes in vision? Yes   Chest tightness? No   Wheezing? No   Chest pain? No   Heart beating fast or racing? No   Blood in stool? No   Constipation? No   Vomiting? No   Diarrhea? No   Drinking much more than usual? No   Urinating much more than usual? No   Difficulty urinating? No   Have a sudden urge to urinate? No   Blood in the urine? No   Joint swelling? No   Joint pain? Yes   Headaches? No   Weakness? No   Confusion? No   Feeling depressed? No     [facility]    11/30/2023  2:21 PM CST - Filed by Patient   This questionnaire should take approximately 5 to 10 minutes to complete.  To begin, press Let's Begin and then press Continue. Let's Begin   On average,  how many days per week do you engage in moderate to strenuous exercise (like a brisk walk)? 5 days   On average, how many minutes do you engage in exercise at this level? 60 min   Do you feel stress - tense, restless, nervous, or anxious, or unable to sleep at night because your mind is troubled all the time - these days? Only a little   Do you belong to any clubs or organizations such as Temple groups, unions, fraternal or athletic groups, or school groups? Yes   How often do you attend meetings of the clubs or organizations you belong to? 1 to 4 times per year   In a typical week, how many times do you talk on the phone with family, friends, or neighbors? Once a week   How often do you get together with friends or relatives? Once a week   Are you , , , , never , or living with a partner?    How hard is it for you to pay for the very basics like food, housing, medical care, and heating? Not hard at all   Within the past 12 months, you worried that your food would run out before you got the money to buy more. Never true   Within the past 12 months, the food you bought just didnt last and you didnt have money to get more. Never true   In the past 12 months, has lack of transportation kept you from medical appointments or from getting medications? No   In the past 12 months, has lack of transportation kept you from meetings, work, or from getting things needed for daily living? No   How often do you have a drink containing alcohol? 2-4 times a month   How many drinks containing alcohol do you have on a typical day when you are drinking? 1 or 2   How often do you have six or more drinks on one occasion? Never   In the last 12 months, was there a time when you were not able to pay the mortgage or rent on time? No   In the last 12 months, how many places have you lived? (range: at least 0) 1   In the last 12 months, was there a time when you did not have a steady place to  "sleep or slept in a shelter (including now)? No         Pmh, Psh, Family Hx, Social Hx, HM updated in Epic Tabs today.   Review of Systems   Constitutional:  Negative for activity change and unexpected weight change.   HENT:  Negative for hearing loss, rhinorrhea and trouble swallowing.    Eyes:  Positive for visual disturbance. Negative for discharge.   Respiratory:  Negative for chest tightness and wheezing.    Cardiovascular:  Negative for chest pain and palpitations.   Gastrointestinal:  Negative for blood in stool, constipation, diarrhea and vomiting.   Endocrine: Negative for polydipsia and polyuria.   Genitourinary:  Negative for difficulty urinating, hematuria and urgency.   Musculoskeletal:  Positive for arthralgias. Negative for joint swelling and neck pain.   Neurological:  Negative for weakness and headaches.   Psychiatric/Behavioral:  Negative for confusion and dysphoric mood.      Objective:     Vitals:    11/30/23 1422   BP: 124/80   BP Location: Left arm   Patient Position: Sitting   BP Method: Small (Manual)   Pulse: 80   Resp: 18   Temp: 98.8 °F (37.1 °C)   TempSrc: Oral   SpO2: 97%   Weight: 81.8 kg (180 lb 5.4 oz)   Height: 5' 11" (1.803 m)     Wt Readings from Last 10 Encounters:   11/30/23 81.8 kg (180 lb 5.4 oz)   12/19/22 81.3 kg (179 lb 2 oz)   10/02/20 83 kg (183 lb)   09/09/20 83 kg (183 lb)   08/10/20 83 kg (183 lb)   06/15/20 83.3 kg (183 lb 10.3 oz)   05/06/19 83.1 kg (183 lb 3.2 oz)   09/11/18 86.2 kg (190 lb)   06/14/18 86.2 kg (190 lb)   04/24/18 86.2 kg (190 lb)     Physical Exam  Vitals reviewed.   Constitutional:       General: He is not in acute distress.     Appearance: Normal appearance. He is well-developed and normal weight.   HENT:      Head: Normocephalic and atraumatic.      Right Ear: Tympanic membrane and external ear normal.      Left Ear: Tympanic membrane and external ear normal.      Nose: Nose normal.      Mouth/Throat:      Mouth: Mucous membranes are moist.      " Pharynx: Oropharynx is clear.   Eyes:      Conjunctiva/sclera: Conjunctivae normal.      Pupils: Pupils are equal, round, and reactive to light.   Neck:      Thyroid: No thyromegaly.   Cardiovascular:      Rate and Rhythm: Normal rate and regular rhythm.      Heart sounds: No murmur heard.     No friction rub. No gallop.   Pulmonary:      Effort: Pulmonary effort is normal. No respiratory distress.      Breath sounds: Normal breath sounds.   Abdominal:      General: Bowel sounds are normal. There is no distension.      Palpations: Abdomen is soft.      Tenderness: There is no abdominal tenderness. There is no rebound.   Musculoskeletal:      Cervical back: Normal range of motion and neck supple.      Right hip: Decreased range of motion.      Right knee: Decreased range of motion.   Lymphadenopathy:      Cervical: No cervical adenopathy.   Skin:     General: Skin is warm and dry.   Neurological:      General: No focal deficit present.      Mental Status: He is alert and oriented to person, place, and time.      Coordination: Coordination normal.   Psychiatric:         Attention and Perception: Attention normal.         Mood and Affect: Mood and affect normal.         Speech: Speech normal.         Behavior: Behavior normal.         Thought Content: Thought content normal.         Cognition and Memory: Cognition normal.         Judgment: Judgment normal.         Assessment:     1. Encounter for general adult medical examination with abnormal findings    2. Psychophysiological insomnia    3. Nocturia    4. Vision changes    5. Right hip pain    6. Restless legs    7. Colon cancer screening    8. Prostate cancer screening    9. Primary osteoarthritis of right knee        Plan:   Sacha was seen today for annual exam.    Diagnoses and all orders for this visit:    Encounter for general adult medical examination with abnormal findings  -     TSH; Future  -     Hemoglobin A1C; Future  -     Lipid Panel; Future  -      Comprehensive Metabolic Panel; Future  -     CBC Auto Differential; Future  -     PSA, Screening; Future    Psychophysiological insomnia  -     Ambulatory referral/consult to Sleep Disorders; Future    Nocturia  -     Ambulatory referral/consult to Sleep Disorders; Future  -     PSA, Screening; Future  -     Ambulatory referral/consult to Urology; Future    Vision changes    Right hip pain  -     X-Ray Hip 2 or 3 views Right (with Pelvis when performed); Future  -     X-Ray Knee Complete 4 Or More Views Right; Future  -     Ambulatory referral/consult to Sports Medicine; Future    Restless legs  -     Ambulatory referral/consult to Sleep Disorders; Future    Colon cancer screening  -     Ambulatory referral/consult to Endo Procedure ; Future    Prostate cancer screening  -     PSA, Screening; Future    Primary osteoarthritis of right knee  -     X-Ray Hip 2 or 3 views Right (with Pelvis when performed); Future  -     X-Ray Knee Complete 4 Or More Views Right; Future  -     Ambulatory referral/consult to Sports Medicine; Future    - labs ordered. Discussed Health Maintenance issues.     Insomnia and nocturia are new problems and are not controlled at this time.   Referral given to urology for nocturia treatment and evaluation.    Referral given to sleep clinic for insomnia treatment. Trial of melatonin also for sleep issues and needing sleep study to evaluation for REM issues and RLS.   Right knee and hip pain is persisting and is not controlled at this time.  Knee and hip X-rays ordered to be completed this week to assess for knee and hip pain etiology.  Referral given to sports medicine for knee pain treatment.   Referral given to schedule colonoscopy.   Lab work ordered to be completed today.   Instructed to f/u in 1 year.     Patient Instructions   REMFresh- 2mg Melatonin     Follow up in about 1 year (around 11/30/2024) for physical with Dr AVENDANO.      LABS:   Lab Results   Component Value Date    HGBA1C  4.8 12/19/2022    HGBA1C 4.8 09/13/2021    HGBA1C 4.8 06/15/2020      Lab Results   Component Value Date    CHOL 214 (H) 12/19/2022    CHOL 153 09/13/2021    CHOL 189 06/15/2020     Lab Results   Component Value Date    LDLCALC 137.8 12/19/2022    LDLCALC 89.4 09/13/2021    LDLCALC 110.8 06/15/2020     Lab Results   Component Value Date    WBC 5.23 12/19/2022    HGB 15.3 12/19/2022    HCT 46.0 12/19/2022     12/19/2022    CHOL 214 (H) 12/19/2022    TRIG 101 12/19/2022    HDL 56 12/19/2022    ALT 35 12/19/2022    AST 23 12/19/2022     12/19/2022    K 4.5 12/19/2022     12/19/2022    CREATININE 1.0 12/19/2022    BUN 16 12/19/2022    CO2 25 12/19/2022    TSH 2.576 12/19/2022    PSA 0.80 12/19/2022    HGBA1C 4.8 12/19/2022       The 10-year ASCVD risk score (Jaylen DK, et al., 2019) is: 1.5%    Values used to calculate the score:      Age: 43 years      Sex: Male      Is Non- : No      Diabetic: No      Tobacco smoker: No      Systolic Blood Pressure: 124 mmHg      Is BP treated: No      HDL Cholesterol: 56 mg/dL      Total Cholesterol: 214 mg/dL  X-ray Knee Ortho Right with Flexion  Narrative: EXAMINATION:  XR KNEE ORTHO RIGHT WITH FLEXION    CLINICAL HISTORY:  Pain in right knee    TECHNIQUE:  Standing AP, standing PA flexion, and Merchant views were obtained of the bilateral knees.  Standing lateral view of the right knee was obtained.    COMPARISON:  None.    FINDINGS:  There is no radiographic evidence of acute osseous, articular, or soft tissue abnormality.  Small tricompartmental marginal osteophytes are present on the right with preserved joint spaces.  Similar findings noted involving the left knee.  Impression: As above.  No acute findings.    Electronically signed by: Dilshad Villatoro MD  Date:    08/10/2020  Time:    09:18  Prevention exam performed at the same time as problem based visit with concerns and treatments addressed by physician, Patient desired to have  these issues addressed at the same time thus 25 modifier added to reflect the time spent on the problem based encounter as well as the prevention services that were performed.     50  minutes of total time spent on the total encounter, 30 min for problem based encounter and 20 minutes for preventative encoubter. This includes face to face time and non-face to face time preparing to see the patient (eg, review of tests), Obtaining and/or reviewing separately obtained history, Documenting clinical information in the electronic or other health record, Independently interpreting results (not separately reported) and communicating results to the patient/family/caregiver, or Care coordination (not separately reported).    Scribe Attestation:   I, Sathish To, am scribing for, and in the presence of, Dr. Zuri Knowles MD. I performed the above scribed service and the documentation accurately describes the services I performed. I attest to the accuracy of the note.    I, Dr. Zuri Knowles MD, reviewed documentation as scribed above. I personally performed the services described in this documentation.  I agree that the record reflects my personal performance and is accurate and complete. Zuri Knowles MD.    11/30/2023

## 2023-11-30 NOTE — PROGRESS NOTES
"Sacha Fregoso presented for a Prevention exam at the same time as problem based visit with concerns and treatments addressed by physician., Patient desired to have these issues addressed at the same time thus 25 modifier added to reflect the time spent on the problem based encounter as well as the prevention services that were performed.      The following components were reviewed and updated:  Pmh, Psh, Family Hx, Social Hx, Allergies, Current Medications, and HM updated in Epic Tabs today.     Vitals:    11/30/23 1422   BP: 124/80   BP Location: Left arm   Patient Position: Sitting   BP Method: Small (Manual)   Pulse: 80   Resp: 18   Temp: 98.8 °F (37.1 °C)   TempSrc: Oral   SpO2: 97%   Weight: 81.8 kg (180 lb 5.4 oz)   Height: 5' 11" (1.803 m)     Body mass index is 25.15 kg/m².       Annual Wellness Visit, Subsequent Z00.01 ICD 10 code    Provided Sacha with a 5-10 year written screening schedule and personal prevention plan. Recommendations were developed using the USPSTF age appropriate recommendations. Education, counseling, and referrals were provided as needed.  After Visit Summary given to patient which includes a list of additional screenings\tests needed.      Health Maintenance Due   Topic Date Due    COVID-19 Vaccine (5 - 2023-24 season) 09/01/2023       Health Maintenance Topics with due status: Not Due       Topic Last Completion Date    Colonoscopy 07/02/2013    TETANUS VACCINE 03/21/2022    Hemoglobin A1c (Diabetic Prevention Screening) 12/19/2022    Lipid Panel 12/19/2022       Patient Active Problem List   Diagnosis    Personal history of colonic polyps    GERD (gastroesophageal reflux disease)    Seasonal allergies    Colon polyps    Right leg weakness    IBS (irritable bowel syndrome)    FH: heart disease    FH: diabetes mellitus    History of exposure to toxins via inhalation    Tinnitus of left ear         Follow up in about 1 year (around 11/30/2024) for physical with Dr" JUAN ALBERTO.      Zuri Knowles MD        Colonoscopy ordered.   Labs ordered for Dec including PSA.   Discussed vaccines.     Scribe Attestation:   I, Sathish To, am scribing for, and in the presence of, Dr. Zuri Knowles MD. I performed the above scribed service and the documentation accurately describes the services I performed. I attest to the accuracy of the note.    I, Dr. Zuri Knowles MD, reviewed documentation as scribed above. I personally performed the services described in this documentation.  I agree that the record reflects my personal performance and is accurate and complete. Zuri Knowles MD.    11/30/2023

## 2023-12-01 ENCOUNTER — PATIENT MESSAGE (OUTPATIENT)
Dept: PULMONOLOGY | Facility: CLINIC | Age: 43
End: 2023-12-01
Payer: OTHER GOVERNMENT

## 2023-12-04 ENCOUNTER — TELEPHONE (OUTPATIENT)
Dept: SPORTS MEDICINE | Facility: CLINIC | Age: 43
End: 2023-12-04
Payer: OTHER GOVERNMENT

## 2023-12-04 ENCOUNTER — HOSPITAL ENCOUNTER (OUTPATIENT)
Dept: PREADMISSION TESTING | Facility: HOSPITAL | Age: 43
Discharge: HOME | End: 2023-12-04
Attending: INTERNAL MEDICINE
Payer: OTHER GOVERNMENT

## 2023-12-04 ENCOUNTER — HOSPITAL ENCOUNTER (OUTPATIENT)
Dept: RADIOLOGY | Facility: HOSPITAL | Age: 43
Discharge: HOME | End: 2023-12-04
Attending: FAMILY MEDICINE
Payer: OTHER GOVERNMENT

## 2023-12-04 DIAGNOSIS — M25.551 RIGHT HIP PAIN: ICD-10-CM

## 2023-12-04 DIAGNOSIS — Z12.11 COLON CANCER SCREENING: ICD-10-CM

## 2023-12-04 DIAGNOSIS — Z12.11 COLON CANCER SCREENING: Primary | ICD-10-CM

## 2023-12-04 DIAGNOSIS — M17.11 PRIMARY OSTEOARTHRITIS OF RIGHT KNEE: ICD-10-CM

## 2023-12-04 PROCEDURE — 73502 X-RAY EXAM HIP UNI 2-3 VIEWS: CPT | Mod: TC,RT

## 2023-12-04 PROCEDURE — 73564 XR KNEE ORTHO RIGHT WITH FLEXION: ICD-10-PCS | Mod: 26,RT,, | Performed by: RADIOLOGY

## 2023-12-04 PROCEDURE — 73562 X-RAY EXAM OF KNEE 3: CPT | Mod: 59,TC,LT

## 2023-12-04 PROCEDURE — 73564 X-RAY EXAM KNEE 4 OR MORE: CPT | Mod: 26,RT,, | Performed by: RADIOLOGY

## 2023-12-04 PROCEDURE — 73562 X-RAY EXAM OF KNEE 3: CPT | Mod: 26,LT,, | Performed by: RADIOLOGY

## 2023-12-04 PROCEDURE — 73502 X-RAY EXAM HIP UNI 2-3 VIEWS: CPT | Mod: 26,RT,, | Performed by: RADIOLOGY

## 2023-12-04 PROCEDURE — 73562 XR KNEE ORTHO RIGHT WITH FLEXION: ICD-10-PCS | Mod: 26,LT,, | Performed by: RADIOLOGY

## 2023-12-04 PROCEDURE — 73502 XR HIP WITH PELVIS WHEN PERFORMED, 2 OR 3  VIEWS RIGHT: ICD-10-PCS | Mod: 26,RT,, | Performed by: RADIOLOGY

## 2023-12-04 RX ORDER — SODIUM, POTASSIUM,MAG SULFATES 17.5-3.13G
1 SOLUTION, RECONSTITUTED, ORAL ORAL DAILY
Qty: 1 KIT | Refills: 0 | Status: SHIPPED | OUTPATIENT
Start: 2023-12-04 | End: 2023-12-06

## 2023-12-04 NOTE — TELEPHONE ENCOUNTER
Called pt and assisted with scheduling appointment that works with his work schedule.  Pt verbalized understanding of appointment date/time/location

## 2023-12-04 NOTE — TELEPHONE ENCOUNTER
----- Message from Jerilyn Gresham sent at 12/4/2023  6:41 AM CST -----  Regarding: Patient Returning Call  Contact: Sacha  .Type:  Patient Returning Call    Who Called: Sacha   Who Left Message for Patient:  Does the patient know what this is regarding?:  Would the patient rather a call back or a response via [facility]? call  Best Call Back Number [phone] (home)    Additional Information: Jackyregismisa has a referral and he missed the call Friday to be scheduled.

## 2023-12-05 NOTE — PROGRESS NOTES
I'd follow up with orthopedics to review your hip/knee concerns. Nothing acute but probably causing some of the knee/hip discomfort.  Zuri Knowles MD

## 2023-12-14 ENCOUNTER — OFFICE VISIT (OUTPATIENT)
Dept: SPORTS MEDICINE | Facility: CLINIC | Age: 43
End: 2023-12-14
Payer: OTHER GOVERNMENT

## 2023-12-14 DIAGNOSIS — M62.89 TENSOR FASCIA LATA SYNDROME: ICD-10-CM

## 2023-12-14 DIAGNOSIS — M22.2X1 PATELLOFEMORAL PAIN SYNDROME OF RIGHT KNEE: Primary | ICD-10-CM

## 2023-12-14 DIAGNOSIS — M17.11 PRIMARY OSTEOARTHRITIS OF RIGHT KNEE: ICD-10-CM

## 2023-12-14 DIAGNOSIS — M25.551 RIGHT HIP PAIN: ICD-10-CM

## 2023-12-14 PROCEDURE — 99999 PR PBB SHADOW E&M-EST. PATIENT-LVL III: ICD-10-PCS | Mod: PBBFAC,,, | Performed by: STUDENT IN AN ORGANIZED HEALTH CARE EDUCATION/TRAINING PROGRAM

## 2023-12-14 PROCEDURE — 99204 PR OFFICE/OUTPT VISIT, NEW, LEVL IV, 45-59 MIN: ICD-10-PCS | Mod: S$PBB,,, | Performed by: STUDENT IN AN ORGANIZED HEALTH CARE EDUCATION/TRAINING PROGRAM

## 2023-12-14 PROCEDURE — 99213 OFFICE O/P EST LOW 20 MIN: CPT | Mod: PBBFAC | Performed by: STUDENT IN AN ORGANIZED HEALTH CARE EDUCATION/TRAINING PROGRAM

## 2023-12-14 PROCEDURE — 99999 PR PBB SHADOW E&M-EST. PATIENT-LVL III: CPT | Mod: PBBFAC,,, | Performed by: STUDENT IN AN ORGANIZED HEALTH CARE EDUCATION/TRAINING PROGRAM

## 2023-12-14 PROCEDURE — 99204 OFFICE O/P NEW MOD 45 MIN: CPT | Mod: S$PBB,,, | Performed by: STUDENT IN AN ORGANIZED HEALTH CARE EDUCATION/TRAINING PROGRAM

## 2023-12-14 RX ORDER — DICLOFENAC SODIUM 75 MG/1
75 TABLET, DELAYED RELEASE ORAL 2 TIMES DAILY WITH MEALS
Qty: 60 TABLET | Refills: 2 | Status: SHIPPED | OUTPATIENT
Start: 2023-12-14 | End: 2024-03-22

## 2023-12-14 NOTE — PATIENT INSTRUCTIONS
Assessment:  Sacha Fregoso is a 43 y.o. male with a chief complaint of Pain of the Right Knee    Encounter Diagnoses   Name Primary?    Patellofemoral pain syndrome of right knee Yes    Tensor fascia anderson syndrome     Primary osteoarthritis of right knee     Right hip pain       Plan:  XR reviewed - mild operative changes of the right knee; slight over coverage of the right hip  Labs reviewed - A1c 4.8%, Cr 1.0, GFR > 60, LFTs WNL  History and clinical exam is consistent with chronic right knee pain due to patellofemoral syndrome, mild degenerative changes, possibly chondromalacia.  Right hip pain is consistent with more tensor fascia anderson syndrome/strain, suspect due to mechanical/functional abnormality from gait changes with running.    Diagnosis, treatment options, prognosis discussed in detail.    Recommend conservative management.    We will switch anti-inflammatory, to diclofenac 75 mg, take twice daily as needed for pain and discomfort.  Recommend to take with food.  We will order for physical therapy at [facility] with Jv Pedro - 2-3 visits per week for 6-8 weeks.     Follow-up: 3 months or sooner if there are any problems between now and then.    Thank you for choosing [facility] and Dr. John Ferguson for your orthopedic & sports medicine care. It is our goal to provide you with exceptional care that will help keep you healthy, active, and get you back in the game.    Please do not hesitate to reach out to us via email, phone, or MyChart with any questions, concerns, or feedback.    If you are experiencing pain/discomfort ,or have questions after 5pm and would like to be connected to the [facility] on-call team, please call this number and specify which Sports Medicine provider is treating you: [phone]

## 2023-12-14 NOTE — PROGRESS NOTES
Patient ID: Sacha Fregoso  YOB: 1980  MRN: [id number]    Chief Complaint: Pain of the Right Knee    Referred By: Dr. Knowles    Occupation:       History of Present Illness: Sacha Fregoso is a 43 y.o. male who presents today with Pain of the Right Knee    He complains of right anterior knee pain since 2020 without injury.  He is active in the  and loves to run.  He complains of aching and throbbing pain that worsens with running and squatting.  He uses ibuprofen and [place].  He was treated in the past with physical therapy with good results but it has been a few years.    Past Medical History:   Past Medical History:   Diagnosis Date    Colon polyp 2008 2013 clear resume at 10 year interval.     GERD (gastroesophageal reflux disease)     Seasonal allergies      Past Surgical History:   Procedure Laterality Date    COLONOSCOPY      VASECTOMY      wisdoom teeth       Family History   Problem Relation Age of Onset    Diabetes Mother     Hyperlipidemia Father     No Known Problems Sister     No Known Problems Brother     No Known Problems Maternal Aunt     No Known Problems Maternal Uncle     No Known Problems Paternal Aunt     No Known Problems Paternal Uncle     No Known Problems Maternal Grandmother     No Known Problems Maternal Grandfather     COPD Paternal Grandmother     No Known Problems Paternal Grandfather     Cancer Paternal Aunt      Social History     Socioeconomic History    Marital status:      Spouse name: Thao    Number of children: 3   Occupational History     Comment: full time [employer]   Tobacco Use    Smoking status: Former     Types: Cigarettes    Smokeless tobacco: Former     Types: Chew   Substance and Sexual Activity    Alcohol use: Yes     Alcohol/week: 1.0 - 2.0 standard drink of alcohol     Types: 1 - 2 Cans of beer per week     Comment: 2x monthly    Drug use: No    Sexual activity: Yes     Partners: Female     Birth  control/protection: Partner-Vasectomy   Social History Narrative    Mom is Saranya Fregoso     Social Determinants of Health     Financial Resource Strain: Low Risk  (11/30/2023)    Overall Financial Resource Strain (CARDIA)     Difficulty of Paying Living Expenses: Not hard at all   Food Insecurity: No Food Insecurity (11/30/2023)    Hunger Vital Sign     Worried About Running Out of Food in the Last Year: Never true     Ran Out of Food in the Last Year: Never true   Transportation Needs: No Transportation Needs (11/30/2023)    PRAPARE - Transportation     Lack of Transportation (Medical): No     Lack of Transportation (Non-Medical): No   Physical Activity: Sufficiently Active (11/30/2023)    Exercise Vital Sign     Days of Exercise per Week: 5 days     Minutes of Exercise per Session: 60 min   Stress: No Stress Concern Present (11/30/2023)    [employer] - Occupational Stress Questionnaire     Feeling of Stress : Only a little   Social Connections: Unknown (11/30/2023)    Social Connection and Isolation Panel [NHANES]     Frequency of Communication with Friends and Family: Once a week     Frequency of Social Gatherings with Friends and Family: Once a week     Active Member of Clubs or Organizations: Yes     Attends Club or Organization Meetings: 1 to 4 times per year     Marital Status:    Housing Stability: Low Risk  (11/30/2023)    Housing Stability Vital Sign     Unable to Pay for Housing in the Last Year: No     Number of Places Lived in the Last Year: 1     Unstable Housing in the Last Year: No     Medication List with Changes/Refills   New Medications    DICLOFENAC (VOLTAREN) 75 MG EC TABLET    Take 1 tablet (75 mg total) by mouth 2 (two) times daily with meals.   Current Medications    FAMOTIDINE (PEPCID) 20 MG TABLET    Take 1 tablet (20 mg total) by mouth 2 (two) times daily.     Review of patient's allergies indicates:  No Known Allergies    Physical Exam:   There is no  height or weight on file to calculate BMI.    Physical Exam  Detailed MSK exam:     Right Knee:  Inspection: Mild effusion, erythema, or ecchymosis   Palpation tenderness: Lateral patella  Range of motion: 0 deg extension - 140 deg flexion  Strength:  5/5 Extension    5/5 Flexion    5-/5 Hip Abduction  Stability: Negative ACL/Lachman      Negative Posterior Drawer      Negative MCL/Valgus Stress      Negative LCL/Varus Stress   Patella Exam: Negative J-sign    Negative lateral patellar tracking   Negative Patellar apprehension   Negative Patellar grind   Meniscus Exam: Negative Jorge's        Negative Thessaly's   N/V Exam:  Tibial:    Normal sensory (plantar foot)  Normal motor (FHL)    Sup Peroneal:   Normal sensory (dorsal foot)  Normal motor (Peroneals)            Deep Peroneal:   Normal sensory (1st web space)  Normal motor (EHL)    Sural:   Normal sensory (lateral foot)   Saphenous:   Normal sensory (medial lower leg)   Normal pedal pulses, warm and well perfused with capillary refill < 2 sec       Right Hip:  TTP lateral to ASIS in TFL  Normal range of motion  5/5 strength with flexion and extension  Negative MELVI  Negative FADIR  Negative Janusz      Imaging:  X-Ray Hip 2 or 3 views Right (with Pelvis when performed)  EXAM: XR HIP WITH PELVIS WHEN PERFORMED, 2 OR 3  VIEWS RIGHT    CLINICAL HISTORY: [M25.551]-Pain in right hip./[M17.11]-Unilateral primary osteoarthritis, right knee.    FINDINGS:  Frontal view the pelvis and 2 views of the right hip.  No acute fracture or dislocation.  No significant degenerative changes.  Mild acetabular over coverage on the right.    IMPRESSION:  As above.    Finalized on: 12/4/2023 8:28 AM By:  Jakub Reyes MD  [facility]# [id number]      2023-12-04 08:30:14.045    BRRKAILASH  X-ray Knee Ortho Right with Flexion  EXAM: XR KNEE ORTHO RIGHT WITH FLEXION    CLINICAL HISTORY: [M25.551]-Pain in right hip./[M17.11]-Unilateral primary osteoarthritis, right knee.    FINDINGS:  4 views right  knee.  3 views of left knee.  Left knee: No acute fracture or dislocation.    Right knee: Small well-corticated ossific fragment posterior medial joint space.  Mild lateral compartment joint space narrowing.  Mild joint compartment osteophytosis.  Small joint effusion.  No acute fracture or dislocation.    IMPRESSION:  Small probable intra-articular body right knee and degenerative changes as above.    Finalized on: 12/4/2023 8:25 AM By:  Jakub Reyes MD  [facility]# [id number]      2023-12-04 08:28:03.875    [facility]    Relevant imaging results were reviewed and interpreted by me and per my read:  Mild tricompartmental degenerative changes of the right knee.  Normal alignment.  No fractures or other acute abnormalities.    Right hip with slight over coverage of the superior acetabulum over the femoral head.  No AVN.  No significant degenerative changes.  No fractures or other acute abnormalities.    This was discussed with the patient and / or family today.     Patient Instructions   Assessment:  Sacha Fregoso is a 43 y.o. male with a chief complaint of Pain of the Right Knee    Encounter Diagnoses   Name Primary?    Patellofemoral pain syndrome of right knee Yes    Tensor fascia anderson syndrome     Primary osteoarthritis of right knee     Right hip pain       Plan:  XR reviewed - mild operative changes of the right knee; slight over coverage of the right hip  Labs reviewed - A1c 4.8%, Cr 1.0, GFR > 60, LFTs WNL  History and clinical exam is consistent with chronic right knee pain due to patellofemoral syndrome, mild degenerative changes, possibly chondromalacia.  Right hip pain is consistent with more tensor fascia anderson syndrome/strain, suspect due to mechanical/functional abnormality from gait changes with running.    Diagnosis, treatment options, prognosis discussed in detail.    Recommend conservative management.    We will switch anti-inflammatory, to diclofenac 75 mg, take twice daily as needed for pain and  discomfort.  Recommend to take with food.  We will order for physical therapy at [facility] with Jv Pedro - 2-3 visits per week for 6-8 weeks.     Follow-up: 3 months or sooner if there are any problems between now and then.    Thank you for choosing [facility] and Dr. John Ferguson for your orthopedic & sports medicine care. It is our goal to provide you with exceptional care that will help keep you healthy, active, and get you back in the game.    Please do not hesitate to reach out to us via email, phone, or MyChart with any questions, concerns, or feedback.    If you are experiencing pain/discomfort ,or have questions after 5pm and would like to be connected to the [facility] on-call team, please call this number and specify which Sports Medicine provider is treating you: [phone]      A copy of today's visit note has been sent to the referring provider.           John Ferguson MD  Primary Care Sports Medicine    Disclaimer: This note was prepared using a voice recognition system and is likely to have sound alike errors within the text.

## 2023-12-14 NOTE — LETTER
December 14, 2023      [facility]  [address]  [place] [address]  Phone: [phone]  Fax: [phone]       Patient: Sacha Fregoso   YOB: 1980  Date of Visit: 12/14/2023    To Whom It May Concern:    Sofia Fregoso  was at [facility] on 12/14/2023.     Please observe the following restrictions at work:    Light jogging only, no sprinting, no deadlifts, no weighted jogging, pushing, pulling or lateral pushes.    If you have any questions or concerns, or if I can be of further assistance, please do not hesitate to contact me.    Sincerely,    John Ferguson MD

## 2023-12-18 RX ORDER — SODIUM, POTASSIUM,MAG SULFATES 17.5-3.13G
1 SOLUTION, RECONSTITUTED, ORAL ORAL DAILY
Qty: 1 KIT | Refills: 0 | Status: SHIPPED | OUTPATIENT
Start: 2023-12-18 | End: 2023-12-20

## 2023-12-20 ENCOUNTER — CLINICAL SUPPORT (OUTPATIENT)
Dept: REHABILITATION | Facility: HOSPITAL | Age: 43
End: 2023-12-20
Payer: OTHER GOVERNMENT

## 2023-12-20 DIAGNOSIS — M22.2X1 PATELLOFEMORAL PAIN SYNDROME OF RIGHT KNEE: ICD-10-CM

## 2023-12-20 DIAGNOSIS — R29.898 RIGHT LEG WEAKNESS: Primary | ICD-10-CM

## 2023-12-20 DIAGNOSIS — M25.551 RIGHT HIP PAIN: ICD-10-CM

## 2023-12-20 DIAGNOSIS — M17.11 PRIMARY OSTEOARTHRITIS OF RIGHT KNEE: ICD-10-CM

## 2023-12-20 PROCEDURE — 97110 THERAPEUTIC EXERCISES: CPT | Performed by: PHYSICAL THERAPIST

## 2023-12-20 PROCEDURE — 97161 PT EVAL LOW COMPLEX 20 MIN: CPT | Performed by: PHYSICAL THERAPIST

## 2023-12-20 NOTE — PROGRESS NOTES
[facility]   Physical Therapy Initial Evaluation      Name: Sacha Fregoso  Clinic Number: [id number]    Therapy Diagnosis:   Encounter Diagnoses   Name Primary?    Right hip pain     Primary osteoarthritis of right knee     Patellofemoral pain syndrome of right knee         Physician: John Ferguson MD    Physician Orders: PT Eval and Treat   Medical Diagnosis from Referral: Right hip pain [M25.551], Primary osteoarthritis of right knee [M17.11], Patellofemoral pain syndrome of right knee [M22.2X1]   Evaluation Date: 12/20/2023  Authorization Period Expiration: 4/12/24  Plan of Care Expiration: 3/20/24  Progress Note Due: 1/20/24  Visit # / Visits authorized: 1/ 1     FOTO:  Goal: ***%  -Intake: ***%  -Status: incomplete  -Discharge: incomplete    Precautions: {IP WOUND PRECAUTIONS OHS:73318}     Time In: ***  Time Out: ***  Total Appointment Time (timed & untimed codes): *** minutes    Subjective     Date of onset: Ongoing issue, worse in the past couple of months    History of current condition - Sacha reports: Pain after running in lateral aspect of knee. He had therapy in the past that helped but pain got worse again. He has periods of flareups periodically. He runs and does cross fit. Typically weights don't hurt, has been hurting more recently.    Running 4 days per week, cross fit the other 3 days per week. 25-30 miles on the runs. After flared up, he cut down to crossfit 2 days per week with lower weight and running 15 miles per week.    He would like to get back to running marathons and occasional half marathons.    Hip pain in the front of the hip, Dr. Ferguson told him that he thought it was related to the TFL. He feels like the hip pain comes and goes with the knee issues. Hip pain after running, often times at night time.    Has recently had tightness in the back when waking up in the morning.    Falls:     Imaging: [] Xray [] MRI [] CT: Performed on:   X-Ray Hip  2 or 3 views Right (with Pelvis when performed)  EXAM: XR HIP WITH PELVIS WHEN PERFORMED, 2 OR 3  VIEWS RIGHT     CLINICAL HISTORY: [M25.551]-Pain in right hip./[M17.11]-Unilateral primary osteoarthritis, right knee.     FINDINGS:  Frontal view the pelvis and 2 views of the right hip.  No acute fracture or dislocation.  No significant degenerative changes.  Mild acetabular over coverage on the right.     IMPRESSION:  As above.     Finalized on: 12/4/2023 8:28 AM By:  Jakub Reyes MD  [facility]# [id number]      2023-12-04 08:30:14.045    [facility]  X-ray Knee Ortho Right with Flexion  EXAM: XR KNEE ORTHO RIGHT WITH FLEXION     CLINICAL HISTORY: [M25.551]-Pain in right hip./[M17.11]-Unilateral primary osteoarthritis, right knee.     FINDINGS:  4 views right knee.  3 views of left knee.  Left knee: No acute fracture or dislocation.     Right knee: Small well-corticated ossific fragment posterior medial joint space.  Mild lateral compartment joint space narrowing.  Mild joint compartment osteophytosis.  Small joint effusion.  No acute fracture or dislocation.     IMPRESSION:  Small probable intra-articular body right knee and degenerative changes as above.     Finalized on: 12/4/2023 8:25 AM By:  Jakub Reyes MD  [facility]# [id number]      2023-12-04 08:28:03.875    [facility]     Relevant imaging results were reviewed and interpreted by me and per my read:  Mild tricompartmental degenerative changes of the right knee.  Normal alignment.  No fractures or other acute abnormalities.     Right hip with slight over coverage of the superior acetabulum over the femoral head.  No AVN.  No significant degenerative changes.  No fractures or other acute abnormalities.    Pain:  Current 0/10, worst 4/10, best 0/10   Location: [x] Right   [] Left:  hip and knee  Description: Throbbing pain  Aggravating Factors: Running, lifting  Easing Factors: activity avoidance, rest    Prior Therapy: Yes  Social History:    Occupation:   Prior Level of  Function: Some pain previously, but was able to run more previously  Current Level of Function: Limited in ability to run and work out    Patients goals: Get back to full function     Medical History:   Past Medical History:   Diagnosis Date    Colon polyp 2008 2013 clear resume at 10 year interval.     GERD (gastroesophageal reflux disease)     Seasonal allergies        Surgical History:   Sacha Fregoso  has a past surgical history that includes wisdoom teeth; Colonoscopy; and Vasectomy.    Medications:   Sacha has a current medication list which includes the following prescription(s): diclofenac, famotidine, and sodium,potassium,mag sulfates.    Allergies:   Review of patient's allergies indicates:  No Known Allergies     Objective      Observation:   POSTURE:    GAIT:   Mild R CPD during mid stance  Mild medial heel whip and abductory twist during terminal stance      FUNCTIONAL MOVEMENT PATTERNS  Movement Analysis Notes   Squat [x]Functional  []Dysfunctional:  []Painful  [x]Non-Painful    Mild L shift at full depth, butt wink   Single Leg Squat  []Functional  [x]Dysfunctional:  []Painful  [x]Non-Painful    B valgus and CPD. Loss of balance on R       MOTOR CONTROL TESTS:    Right  (spine) Left  Goal   Lower extremity      Prone knee bend [x] Positive  [] Negative  Comments: [x] Positive  [] Negative  Comments: Negative B    Prone hip rotation [] Positive  [x] Negative  Comments: [] Positive  [x] Negative  Comments:           Lower  Limb Neurodynamic testing:   Right Left   SLR     Tibial SLR     Sural SLR     Superficial Fibular SLR     Slump test + concordant -   Femoral + concordant -          RANGE OF MOTION:   Lumbar ROM Right  (spine) Left   Pain/Dysfunction with Movement Goal   Lumbar Flexion (60º)  ---     Lumbar Extension (30º)  ---     Lumbar Side Bending (25º) 100% 75% R hip symptoms    Lumbar Rotation 50% 50%         Hip AROM/PROM Right Left Pain/Dysfunction with Movement Goal   Hip  Flexion (120º) 120 120     Hip Extension (30º) 10 10     Hip Abduction (45º)       Hip IR (45º) 25 30     Hip ER (45º) 45 45         Knee AROM/PROM Right Left Pain/Dysfunction with Movement Goal   Knee Flexion (135º) 135 140     Knee Extension (0º) +2 +2            STRENGTH:   L/E MMT Right  (spine) Left Pain/Dysfunction with Movement Goal   Hip Flexion  5/5 5/5  4+/5 B   Hip Extension  4+/5 4+/5 Lumbar extension, hamstring dominant bilaterally 4+/5 B   Hip Abduction  4/5 4+/5  4+/5 B   Knee Extension 4/5 5/5 pain 5/5 B   Knee Flexion    5/5 B   Hip IR    4+/5 B   Hip ER 4/5 5/5  4+/5 B   Ankle DF    5/5 B   Ankle PF 5/5 5/5  5/5 B   Ankle Inversion    5/5 B   Ankle Eversion    5/5 B          MUSCLE LENGTH:   Muscle Tested  Right Left  Limitation Goal   Hip Flexors [] Normal  [] Limited [] Normal  [] Limited  Normal B   ITB / TFL [x] Normal  [] Limited [x] Normal  [] Limited  Normal B   Quadriceps [] Normal  [x] Limited [] Normal  [x] Limited  Normal B   Hamstrings  [] Normal  [] Limited [] Normal  [] Limited  Normal B   Piriformis  [] Normal  [] Limited [] Normal  [] Limited  Normal B   Gastrocnemius  [x] Normal  [] Limited [x] Normal  [] Limited  Normal B   Soleus  [x] Normal  [] Limited [x] Normal  [] Limited  Normal B         JOINT MOBILITY:   Mild decreased R hip mobility  Mild impaired R knee mobility  Normal lumbar mobility      SPECIAL TESTS:    Right  (spine) Left  Goal   MELVI  [x] Positive    [] Negative [] Positive    [x] Negative Negative B    FADIR  [] Positive    [x] Negative [] Positive    [x] Negative Negative B    Scour  [] Positive    [x] Negative [] Positive    [x] Negative Negative B         Right  (spine) Left  Goal   Jorge Test [x] Positive - lateral    [] Negative [] Positive    [x] Negative Negative B           SENSATION  [x] Intact to Light Touch   [] Impaired:      PALPATION: Structures: Increased tenderness to palpation of:  No significant tenderness noted        Function:     Intake  Outcome Measure for FOTO knee Survey    Therapist reviewed FOTO scores for Sacha Fregoso on 12/20/2023.   FOTO documents entered into EPIC - see Media section.    Intake Score: ***%         Treatment     Total Treatment time (time-based codes) separate from Evaluation: (***) minutes     Sacha received the treatments listed below:      Sacha received the following manual therapy techniques: Joint mobilizations and Soft tissue Mobilization were applied to the: *** for *** minutes, including:     Performed Today    Mobility assessment     Joint mobilization     Soft tissue mobilization     Dry needling       Plan for Next Visit: Continue as indicated       Sacha received therapeutic exercises to develop strength, endurance, ROM, flexibility, posture, and core stabilization for *** minutes including:       Performed Today    Strength / range of motion assessment          Flexibility/mobility/ROM          Strength            Plan for Next Visit: See above       Sacha participated in neuromuscular re-education activities to improve: Balance, Coordination, Kinesthetic, Sense, Proprioception, and Posture for *** minutes. The following activities were included:       Performed Today    Motor control/posture               Balance/proprioception                 Plan for Next Visit: See above       Sacha participated in dynamic functional therapeutic activities to improve functional performance for ***  minutes, including:       Performed Today    Functional strength                    Power            Plan for Next Visit: See above         Patient Education and Home Exercises     Education provided: (***) minutes  {PATIENT EDUCATION (Optional):32674}  Activity Modifications: ***    Written Home Exercises Provided: yes.  Exercises were reviewed and Sacha was able to demonstrate them prior to the end of the session.  Sacha demonstrated {Desc; good/fair/poor:29704} understanding of the  "education provided. See EMR under Patient Instructions for exercises provided during therapy sessions.    Assessment     Sacha is a 43 y.o. male referred to outpatient Physical Therapy with a medical diagnosis of Right hip pain [M25.551], Primary osteoarthritis of right knee [M17.11], Patellofemoral pain syndrome of right knee [M22.2X1] . Clinical exam is consistent with gluteal tendinopathy and patellofemoral vs. Tibiofemoral knee joint pain. Pt presents with the following Primary impairments: Dominant hamstring, knee valgus,    Pt prognosis is Good  Pt will benefit from skilled outpatient Physical Therapy to address the deficits stated above and in the chart below, provide pt/family education, and to maximize pt's level of independence.     Plan of care discussed with patient: Yes  Pt's spiritual, cultural and educational needs considered and patient is agreeable to the plan of care and goals as stated below:     Anticipated Barriers for therapy: chronicity of condition    Medical Necessity is demonstrated by the following ***  History  Co-morbidities and personal factors that may impact the plan of care [] LOW: no personal factors / co-morbidities  [] MODERATE: 1-2 personal factors / co-morbidities  [] HIGH: 3+ personal factors / co-morbidities    Moderate / High Support Documentation: See patient medical history and objective assessment     Examination  Body Structures and Functions, activity limitations and participation restrictions that may impact the plan of care [] LOW: addressing 1-2 elements  [] MODERATE: 3+ elements  [] HIGH: 4+ elements (please support below)    Moderate / High Support Documentation: See patient medical history and objective assessment     Clinical Presentation [] LOW: stable  [] MODERATE: Evolving  [] HIGH: Unstable     Decision Making/ Complexity Score: {Desc; low/moderate/high:172695}         Short Term Goals:  {numbers 1-12:72558::"6"} weeks Status  Date Met   PAIN: Pt will " "report worst pain of ***/10 in order to progress toward max functional ability and improve quality of life. [x] Progressing  [] Met  [] Not Met    FUNCTION: Patient will demonstrate improved function as indicated by a functional score improvement of at least 5 points on FOTO. [x] Progressing  [] Met  [] Not Met    MOBILITY: Patient will improve AROM to 50% of stated goals, listed in objective measures above, in order to progress towards independence with functional activities.  [x] Progressing  [] Met  [] Not Met    STRENGTH: Patient will improve strength to 50% of stated goals, listed in objective measures above, in order to progress towards independence with functional activities. [x] Progressing  [] Met  [] Not Met    POSTURE: Patient will correct postural deviations in sitting and standing, to decrease pain and promote long term stability.  [x] Progressing  [] Met  [] Not Met    GAIT: Patient will demonstrate improved gait mechanics including *** in order to improve functional mobility, improve quality of life, and decrease risk of further injury or fall.  [x] Progressing  [] Met  [] Not Met    HEP: Patient will demonstrate independence with HEP in order to progress toward functional independence. [x] Progressing  [] Met  [] Not Met    *** [x] Progressing  [] Met  [] Not Met      Long Term Goals:  {numbers 1-12:54492::"12"} weeks Status Date Met   PAIN: Pt will report worst pain of ***/10 in order to progress toward max functional ability and improve quality of life [x] Progressing  [] Met  [] Not Met    FUNCTION: Patient will demonstrate improved function as indicated by a FOTO functional score improvement as listed in header. [x] Progressing  [] Met  [] Not Met    MOBILITY: Patient will improve AROM to stated goals, listed in objective measures above, in order to return to maximal functional potential and improve quality of life. {Set ROM goals} [x] Progressing  [] Met  [] Not Met    STRENGTH: Patient will " "improve strength to stated goals, listed in objective measures above, in order to improve functional independence and quality of life. {Set strength goals} [x] Progressing  [] Met  [] Not Met    GAIT: Patient will demonstrate normalized gait mechanics with minimal compensation in order to return to PLOF. [x] Progressing  [] Met  [] Not Met    Patient will return to normal ADL's, IADL's, community involvement, recreational activities, and work-related activities with less than or equal to ***/10 pain and maximal function.  [x] Progressing  [] Met  [] Not Met    *** [x] Progressing  [] Met  [] Not Met      Plan   Plan of care Certification: 12/20/2023 to ***.    Outpatient Physical Therapy {Numbers; 1-5:25974::"2"} times weekly for {numbers 1-12:00886::"12"} weeks to include any combination of the following interventions: virtual visits, dry needling, modalities, electrical stimulation (IFC, Pre-Mod, Attended with Functional Dry Needling), {TX PLAN:71993::"Cervical/Lumbar Traction","Gait Training","Manual Therapy","Neuromuscular Re-ed","Patient Education","Self Care","Therapeutic Exercise","Therapeutic Activites"}     Jv Pedro, PT, DPT      I CERTIFY THE NEED FOR THESE SERVICES FURNISHED UNDER THIS PLAN OF TREATMENT AND WHILE UNDER MY CARE   Physician's comments:     Physician's Signature: ___________________________________________________     "

## 2023-12-22 ENCOUNTER — ANESTHESIA EVENT (OUTPATIENT)
Dept: ENDOSCOPY | Facility: HOSPITAL | Age: 43
End: 2023-12-22
Payer: OTHER GOVERNMENT

## 2023-12-22 NOTE — ANESTHESIA PREPROCEDURE EVALUATION
12/22/2023  Sacha Fregoso is a 43 y.o., male.    Past Medical History:   Diagnosis Date    Colon polyp 2008 2013 clear resume at 10 year interval.     GERD (gastroesophageal reflux disease)     Seasonal allergies      Past Surgical History:   Procedure Laterality Date    COLONOSCOPY      VASECTOMY      wisdoom teeth       No current facility-administered medications for this encounter.    Current Outpatient Medications:     diclofenac (VOLTAREN) 75 MG EC tablet, Take 1 tablet (75 mg total) by mouth 2 (two) times daily with meals., Disp: 60 tablet, Rfl: 2    famotidine (PEPCID) 20 MG tablet, Take 1 tablet (20 mg total) by mouth 2 (two) times daily., Disp: 180 tablet, Rfl: 3     Pre-op Assessment    I have reviewed the Patient Summary Reports.    I have reviewed the NPO Status.   I have reviewed the Medications.     Review of Systems  Anesthesia Hx:  No problems with previous Anesthesia   Neg history of prior surgery.          Denies Family Hx of Anesthesia complications.    Denies Personal Hx of Anesthesia complications.                    Social:  Non-Smoker, Social Alcohol Use       Hepatic/GI:     GERD             Musculoskeletal:  Arthritis                   Physical Exam  General: Oriented and Alert    Airway:  Mallampati: I / I  Mouth Opening: Normal  TM Distance: Normal  Tongue: Normal  Neck ROM: Normal ROM  Pre-Existing Airway: Oral Endotracheal tube    Dental:  Intact    Chest/Lungs:  Clear to auscultation, Normal Respiratory Rate    Heart:  Rate: Normal  Rhythm: Regular Rhythm        Anesthesia Plan  Type of Anesthesia, risks & benefits discussed:    Anesthesia Type: Gen Natural Airway  Intra-op Monitoring Plan: Standard ASA Monitors  Post Op Pain Control Plan: multimodal analgesia  Induction:  IV  Informed Consent: Informed consent signed with the Patient and all parties understand the  risks and agree with anesthesia plan.  All questions answered.   ASA Score: 2  Day of Surgery Review of History & Physical: H&P Update referred to the surgeon/provider.    Ready For Surgery From Anesthesia Perspective.     .

## 2023-12-23 PROBLEM — M22.2X1 PATELLOFEMORAL PAIN SYNDROME OF RIGHT KNEE: Status: ACTIVE | Noted: 2023-12-23

## 2023-12-23 PROBLEM — M25.551 RIGHT HIP PAIN: Status: ACTIVE | Noted: 2023-12-23

## 2023-12-23 PROBLEM — M17.11 PRIMARY OSTEOARTHRITIS OF RIGHT KNEE: Status: ACTIVE | Noted: 2023-12-23

## 2023-12-23 NOTE — PLAN OF CARE
[facility]   Physical Therapy Initial Evaluation      Name: Sacha Fregoso  Clinic Number: [id number]    Therapy Diagnosis:   Encounter Diagnoses   Name Primary?    Right hip pain     Primary osteoarthritis of right knee     Patellofemoral pain syndrome of right knee     Right leg weakness Yes        Physician: John Ferguson MD    Physician Orders: PT Eval and Treat   Medical Diagnosis from Referral: Right hip pain [M25.551], Primary osteoarthritis of right knee [M17.11], Patellofemoral pain syndrome of right knee [M22.2X1]   Evaluation Date: 12/20/2023  Authorization Period Expiration: 4/12/24  Plan of Care Expiration: 3/20/24  Progress Note Due: 1/20/24  Visit # / Visits authorized: 1/ 1     FOTO:  Goal: 72%  -Intake: 59%  -Status: incomplete  -Discharge: incomplete    Precautions: Standard     Time In: 1600  Time Out: 1700  Total Appointment Time (timed & untimed codes): 60 minutes    Subjective     Date of onset: Ongoing issue, worse in the past couple of months    History of current condition - Sacha reports: Pain after running in lateral aspect of knee. He had therapy in the past that helped but pain got worse again. He has periods of flareups periodically. He runs and does cross fit. Typically weights don't hurt, has been hurting more recently.    Running 4 days per week, cross fit the other 3 days per week. 25-30 miles on the runs. After flared up, he cut down to crossfit 2 days per week with lower weight and running 15 miles per week.    He would like to get back to running marathons and occasional half marathons.    Hip pain in the front of the hip, Dr. Ferguson told him that he thought it was related to the TFL. He feels like the hip pain comes and goes with the knee issues. Hip pain after running, often times at night time.    Has recently had tightness in the back when waking up in the morning.    Falls:     Imaging: [] Xray [] MRI [] CT: Performed on:   X-Ray  Hip 2 or 3 views Right (with Pelvis when performed)  EXAM: XR HIP WITH PELVIS WHEN PERFORMED, 2 OR 3  VIEWS RIGHT     CLINICAL HISTORY: [M25.551]-Pain in right hip./[M17.11]-Unilateral primary osteoarthritis, right knee.     FINDINGS:  Frontal view the pelvis and 2 views of the right hip.  No acute fracture or dislocation.  No significant degenerative changes.  Mild acetabular over coverage on the right.     IMPRESSION:  As above.     Finalized on: 12/4/2023 8:28 AM By:  Jakub Reyes MD  [facility]# [id number]      2023-12-04 08:30:14.045    [facility]  X-ray Knee Ortho Right with Flexion  EXAM: XR KNEE ORTHO RIGHT WITH FLEXION     CLINICAL HISTORY: [M25.551]-Pain in right hip./[M17.11]-Unilateral primary osteoarthritis, right knee.     FINDINGS:  4 views right knee.  3 views of left knee.  Left knee: No acute fracture or dislocation.     Right knee: Small well-corticated ossific fragment posterior medial joint space.  Mild lateral compartment joint space narrowing.  Mild joint compartment osteophytosis.  Small joint effusion.  No acute fracture or dislocation.     IMPRESSION:  Small probable intra-articular body right knee and degenerative changes as above.     Finalized on: 12/4/2023 8:25 AM By:  Jakub Reyes MD  [facility]# [id number]      2023-12-04 08:28:03.875    [facility]     Relevant imaging results were reviewed and interpreted by me and per my read:  Mild tricompartmental degenerative changes of the right knee.  Normal alignment.  No fractures or other acute abnormalities.     Right hip with slight over coverage of the superior acetabulum over the femoral head.  No AVN.  No significant degenerative changes.  No fractures or other acute abnormalities.    Pain:  Current 0/10, worst 4/10, best 0/10   Location: [x] Right   [] Left:  hip and knee  Description: Throbbing pain  Aggravating Factors: Running, lifting  Easing Factors: activity avoidance, rest    Prior Therapy: Yes  Social History:    Occupation:   Prior Level of  Function: Some pain previously, but was able to run more previously  Current Level of Function: Limited in ability to run and work out    Patients goals: Get back to full function     Medical History:   Past Medical History:   Diagnosis Date    Colon polyp 2008 2013 clear resume at 10 year interval.     GERD (gastroesophageal reflux disease)     Seasonal allergies        Surgical History:   Sacha Fregoso  has a past surgical history that includes wisdoom teeth; Colonoscopy; and Vasectomy.    Medications:   Sacha has a current medication list which includes the following prescription(s): diclofenac and famotidine.    Allergies:   Review of patient's allergies indicates:  No Known Allergies     Objective      Observation:   POSTURE:    GAIT:   Mild R CPD during mid stance  Mild medial heel whip and abductory twist during terminal stance      FUNCTIONAL MOVEMENT PATTERNS  Movement Analysis Notes   Squat [x]Functional  []Dysfunctional:  []Painful  [x]Non-Painful    Mild L shift at full depth, butt wink   Single Leg Squat  []Functional  [x]Dysfunctional:  []Painful  [x]Non-Painful    B valgus and CPD. Loss of balance on R       MOTOR CONTROL TESTS:    Right  (spine) Left  Goal   Lower extremity      Prone knee bend [x] Positive  [] Negative  Comments: [x] Positive  [] Negative  Comments: Negative B    Prone hip rotation [] Positive  [x] Negative  Comments: [] Positive  [x] Negative  Comments:           Lower  Limb Neurodynamic testing:   Right Left   SLR     Tibial SLR     Sural SLR     Superficial Fibular SLR     Slump test + concordant -   Femoral + concordant -          RANGE OF MOTION:   Lumbar ROM Right  (spine) Left   Pain/Dysfunction with Movement Goal   Lumbar Flexion (60º)  ---     Lumbar Extension (30º)  ---     Lumbar Side Bending (25º) 100% 75% R hip symptoms    Lumbar Rotation 50% 50%         Hip AROM/PROM Right Left Pain/Dysfunction with Movement Goal   Hip Flexion (120º) 120 120     Hip  Extension (30º) 10 10     Hip Abduction (45º)       Hip IR (45º) 25 30     Hip ER (45º) 45 45         Knee AROM/PROM Right Left Pain/Dysfunction with Movement Goal   Knee Flexion (135º) 135 140     Knee Extension (0º) +2 +2            STRENGTH:   L/E MMT Right  (spine) Left Pain/Dysfunction with Movement Goal   Hip Flexion  5/5 5/5  4+/5 B   Hip Extension  4+/5 4+/5 Lumbar extension, hamstring dominant bilaterally 4+/5 B   Hip Abduction  4/5 4+/5  4+/5 B   Knee Extension 4/5 5/5 pain 5/5 B   Knee Flexion    5/5 B   Hip IR    4+/5 B   Hip ER 4/5 5/5  4+/5 B   Ankle DF    5/5 B   Ankle PF 5/5 5/5  5/5 B   Ankle Inversion    5/5 B   Ankle Eversion    5/5 B          MUSCLE LENGTH:   Muscle Tested  Right Left  Limitation Goal   Hip Flexors [] Normal  [] Limited [] Normal  [] Limited  Normal B   ITB / TFL [x] Normal  [] Limited [x] Normal  [] Limited  Normal B   Quadriceps [] Normal  [x] Limited [] Normal  [x] Limited  Normal B   Hamstrings  [] Normal  [] Limited [] Normal  [] Limited  Normal B   Piriformis  [] Normal  [] Limited [] Normal  [] Limited  Normal B   Gastrocnemius  [x] Normal  [] Limited [x] Normal  [] Limited  Normal B   Soleus  [x] Normal  [] Limited [x] Normal  [] Limited  Normal B         JOINT MOBILITY:   Mild decreased R hip mobility  Mild impaired R knee mobility  Normal lumbar mobility      SPECIAL TESTS:    Right  (spine) Left  Goal   MELVI  [x] Positive    [] Negative [] Positive    [x] Negative Negative B    FADIR  [] Positive    [x] Negative [] Positive    [x] Negative Negative B    Scour  [] Positive    [x] Negative [] Positive    [x] Negative Negative B         Right  (spine) Left  Goal   Jorge Test [x] Positive - lateral    [] Negative [] Positive    [x] Negative Negative B           SENSATION  [x] Intact to Light Touch   [] Impaired:      PALPATION: Structures: Increased tenderness to palpation of:  No significant tenderness noted        Function:     Intake Outcome Measure for FOTO knee  Survey    Therapist reviewed FOTO scores for Sacha Fregoso on 12/20/2023.   FOTO documents entered into EPIC - see Media section.    Intake Score: 59%         Treatment     Total Treatment time (time-based codes) separate from Evaluation: (10) minutes     Sacha received the treatments listed below:      Sacha received the following manual therapy techniques: Joint mobilizations and Soft tissue Mobilization were applied to the: lower quarter for 00 minutes, including:     Performed Today    Mobility assessment     Joint mobilization  Hip   Soft tissue mobilization     Dry needling       Plan for Next Visit: Continue as indicated       Sacha received therapeutic exercises to develop strength, endurance, ROM, flexibility, posture, and core stabilization for 10 minutes including:    HEP instruction x 10 minutes     Performed Today    Strength / range of motion assessment          Flexibility/mobility/ROM     Quad stretch          Strength     Quad     Hamstring     Calf       Plan for Next Visit: See above       Sacha participated in neuromuscular re-education activities to improve: Balance, Coordination, Kinesthetic, Sense, Proprioception, and Posture for 00 minutes. The following activities were included:       Performed Today    Motor control/posture     Hip abductor     Lateral step down     Glute          Balance/proprioception                 Plan for Next Visit: See above       Sacha participated in dynamic functional therapeutic activities to improve functional performance for 00  minutes, including:       Performed Today    Functional strength                    Power            Plan for Next Visit: See above         Patient Education and Home Exercises     Education provided: (5) minutes  PURPOSE: Patient educated on the impairments noted above and the effects of physical therapy intervention to improve overall condition and QOL.   Lower quarter motor control  Activity  Modifications: Monitoring morning symptoms    Written Home Exercises Provided: yes.  Exercises were reviewed and Sacha was able to demonstrate them prior to the end of the session.  Sacha demonstrated good  understanding of the education provided. See EMR under Patient Instructions for exercises provided during therapy sessions.    Assessment     Sacha is a 43 y.o. male referred to outpatient Physical Therapy with a medical diagnosis of Right hip pain [M25.551], Primary osteoarthritis of right knee [M17.11], Patellofemoral pain syndrome of right knee [M22.2X1] . Clinical exam is consistent with gluteal tendinopathy and patellofemoral vs. Tibiofemoral knee joint pain. Pt presents with the following Primary impairments: Dominant hamstring, knee valgus,    Pt prognosis is Good  Pt will benefit from skilled outpatient Physical Therapy to address the deficits stated above and in the chart below, provide pt/family education, and to maximize pt's level of independence.     Plan of care discussed with patient: Yes  Pt's spiritual, cultural and educational needs considered and patient is agreeable to the plan of care and goals as stated below:     Anticipated Barriers for therapy: chronicity of condition    Medical Necessity is demonstrated by the following   History  Co-morbidities and personal factors that may impact the plan of care [x] LOW: no personal factors / co-morbidities  [] MODERATE: 1-2 personal factors / co-morbidities  [] HIGH: 3+ personal factors / co-morbidities    Moderate / High Support Documentation: See patient medical history and objective assessment     Examination  Body Structures and Functions, activity limitations and participation restrictions that may impact the plan of care [x] LOW: addressing 1-2 elements  [] MODERATE: 3+ elements  [] HIGH: 4+ elements (please support below)    Moderate / High Support Documentation: See patient medical history and objective assessment     Clinical  Presentation [x] LOW: stable  [] MODERATE: Evolving  [] HIGH: Unstable     Decision Making/ Complexity Score: low         Short Term Goals:  6 weeks Status  Date Met   PAIN: Pt will report worst pain of 2/10 in order to progress toward max functional ability and improve quality of life. [x] Progressing  [] Met  [] Not Met    FUNCTION: Patient will demonstrate improved function as indicated by a functional score improvement of at least 5 points on FOTO. [x] Progressing  [] Met  [] Not Met    MOBILITY: Patient will improve AROM to 50% of stated goals, listed in objective measures above, in order to progress towards independence with functional activities.  [x] Progressing  [] Met  [] Not Met    STRENGTH: Patient will improve strength to 50% of stated goals, listed in objective measures above, in order to progress towards independence with functional activities. [x] Progressing  [] Met  [] Not Met    POSTURE: Patient will correct postural deviations in sitting and standing, to decrease pain and promote long term stability.  [x] Progressing  [] Met  [] Not Met    HEP: Patient will demonstrate independence with HEP in order to progress toward functional independence. [x] Progressing  [] Met  [] Not Met      Long Term Goals:  12 weeks Status Date Met   PAIN: Pt will report worst pain of 1/10 in order to progress toward max functional ability and improve quality of life [x] Progressing  [] Met  [] Not Met    FUNCTION: Patient will demonstrate improved function as indicated by a FOTO functional score improvement as listed in header. [x] Progressing  [] Met  [] Not Met    MOBILITY: Patient will improve AROM to stated goals, listed in objective measures above, in order to return to maximal functional potential and improve quality of life.  [x] Progressing  [] Met  [] Not Met    STRENGTH: Patient will improve strength to stated goals, listed in objective measures above, in order to improve functional independence and quality  of life.  [x] Progressing  [] Met  [] Not Met    GAIT: Patient will demonstrate normalized gait mechanics with minimal compensation in order to return to PLOF. [x] Progressing  [] Met  [] Not Met    Patient will return to normal ADL's, IADL's, community involvement, recreational activities, and work-related activities with less than or equal to 1/10 pain and maximal function.  [x] Progressing  [] Met  [] Not Met      Plan   Plan of care Certification: 12/20/2023 to 3/20/24.    Outpatient Physical Therapy 2 times weekly for 12 weeks to include any combination of the following interventions: virtual visits, dry needling, modalities, electrical stimulation (IFC, Pre-Mod, Attended with Functional Dry Needling), Cervical/Lumbar Traction, Gait Training, Manual Therapy, Neuromuscular Re-ed, Patient Education, Self Care, Therapeutic Exercise, and Therapeutic Activites     Jv Pedro, PT, DPT      I CERTIFY THE NEED FOR THESE SERVICES FURNISHED UNDER THIS PLAN OF TREATMENT AND WHILE UNDER MY CARE   Physician's comments:     Physician's Signature: ___________________________________________________

## 2023-12-27 ENCOUNTER — ANESTHESIA (OUTPATIENT)
Dept: ENDOSCOPY | Facility: HOSPITAL | Age: 43
End: 2023-12-27
Payer: OTHER GOVERNMENT

## 2023-12-27 ENCOUNTER — HOSPITAL ENCOUNTER (OUTPATIENT)
Facility: HOSPITAL | Age: 43
Discharge: HOME | End: 2023-12-27
Attending: INTERNAL MEDICINE | Admitting: INTERNAL MEDICINE
Payer: OTHER GOVERNMENT

## 2023-12-27 VITALS
HEIGHT: 71 IN | SYSTOLIC BLOOD PRESSURE: 113 MMHG | OXYGEN SATURATION: 99 % | RESPIRATION RATE: 16 BRPM | BODY MASS INDEX: 24.75 KG/M2 | WEIGHT: 176.81 LBS | DIASTOLIC BLOOD PRESSURE: 59 MMHG | HEART RATE: 54 BPM | TEMPERATURE: 98 F

## 2023-12-27 DIAGNOSIS — Z86.010 PERSONAL HISTORY OF COLONIC POLYPS: Primary | ICD-10-CM

## 2023-12-27 PROCEDURE — D9220A PRA ANESTHESIA: Mod: ,,, | Performed by: NURSE ANESTHETIST, CERTIFIED REGISTERED

## 2023-12-27 PROCEDURE — 00812 ANES LWR INTST SCR COLSC: CPT | Performed by: INTERNAL MEDICINE

## 2023-12-27 PROCEDURE — 63600175 PHARM REV CODE 636 W HCPCS: Performed by: INTERNAL MEDICINE

## 2023-12-27 PROCEDURE — 63600175 PHARM REV CODE 636 W HCPCS: Performed by: NURSE ANESTHETIST, CERTIFIED REGISTERED

## 2023-12-27 PROCEDURE — 25000003 PHARM REV CODE 250: Performed by: NURSE ANESTHETIST, CERTIFIED REGISTERED

## 2023-12-27 PROCEDURE — 37000008 HC ANESTHESIA 1ST 15 MINUTES: Performed by: INTERNAL MEDICINE

## 2023-12-27 PROCEDURE — G0105 COLORECTAL SCRN; HI RISK IND: HCPCS | Performed by: INTERNAL MEDICINE

## 2023-12-27 PROCEDURE — D9220A PRA ANESTHESIA: ICD-10-PCS | Mod: ,,, | Performed by: NURSE ANESTHETIST, CERTIFIED REGISTERED

## 2023-12-27 PROCEDURE — 25000003 PHARM REV CODE 250: Performed by: INTERNAL MEDICINE

## 2023-12-27 PROCEDURE — G0105 COLORECTAL SCRN; HI RISK IND: HCPCS | Mod: ,,, | Performed by: INTERNAL MEDICINE

## 2023-12-27 PROCEDURE — 37000009 HC ANESTHESIA EA ADD 15 MINS: Performed by: INTERNAL MEDICINE

## 2023-12-27 RX ORDER — SODIUM CHLORIDE, SODIUM LACTATE, POTASSIUM CHLORIDE, CALCIUM CHLORIDE 600; 310; 30; 20 MG/100ML; MG/100ML; MG/100ML; MG/100ML
INJECTION, SOLUTION INTRAVENOUS CONTINUOUS
Status: DISCONTINUED | OUTPATIENT
Start: 2023-12-27 | End: 2023-12-27 | Stop reason: HOSPADM

## 2023-12-27 RX ORDER — LIDOCAINE HYDROCHLORIDE 20 MG/ML
INJECTION, SOLUTION EPIDURAL; INFILTRATION; INTRACAUDAL; PERINEURAL
Status: DISCONTINUED | OUTPATIENT
Start: 2023-12-27 | End: 2023-12-27

## 2023-12-27 RX ORDER — PROPOFOL 10 MG/ML
VIAL (ML) INTRAVENOUS
Status: DISCONTINUED | OUTPATIENT
Start: 2023-12-27 | End: 2023-12-27

## 2023-12-27 RX ORDER — DEXTROMETHORPHAN/PSEUDOEPHED 2.5-7.5/.8
DROPS ORAL
Status: COMPLETED | OUTPATIENT
Start: 2023-12-27 | End: 2023-12-27

## 2023-12-27 RX ADMIN — PROPOFOL 30 MG: 10 INJECTION, EMULSION INTRAVENOUS at 12:12

## 2023-12-27 RX ADMIN — GLYCOPYRROLATE 0.2 MG: 0.2 INJECTION, SOLUTION INTRAMUSCULAR; INTRAVITREAL at 12:12

## 2023-12-27 RX ADMIN — LIDOCAINE HYDROCHLORIDE 50 MG: 20 INJECTION, SOLUTION EPIDURAL; INFILTRATION; INTRACAUDAL; PERINEURAL at 12:12

## 2023-12-27 RX ADMIN — PROPOFOL 40 MG: 10 INJECTION, EMULSION INTRAVENOUS at 12:12

## 2023-12-27 RX ADMIN — SODIUM CHLORIDE, POTASSIUM CHLORIDE, SODIUM LACTATE AND CALCIUM CHLORIDE: 600; 310; 30; 20 INJECTION, SOLUTION INTRAVENOUS at 10:12

## 2023-12-27 RX ADMIN — PROPOFOL 20 MG: 10 INJECTION, EMULSION INTRAVENOUS at 12:12

## 2023-12-27 RX ADMIN — PROPOFOL 50 MG: 10 INJECTION, EMULSION INTRAVENOUS at 12:12

## 2023-12-27 RX ADMIN — PROPOFOL 10 MG: 10 INJECTION, EMULSION INTRAVENOUS at 12:12

## 2023-12-27 RX ADMIN — PROPOFOL 120 MG: 10 INJECTION, EMULSION INTRAVENOUS at 12:12

## 2023-12-27 NOTE — H&P
Short Stay Endoscopy History and Physical    PCP - Zuri Knowles MD    Procedure - Colonoscopy  ASA - 2  Mallampati - per anesthesia  History of Anesthesia problems - no  Family history Anesthesia problems -  no     HPI:  This is a 43 y.o. male here for evaluation of :   Active Hospital Problems    Diagnosis  POA    *Personal history of colonic polyps [Z86.010]  Not Applicable      Resolved Hospital Problems   No resolved problems to display.         Health Maintenance         Date Due Completion Date    Hepatitis C Screening Never done ---    COVID-19 Vaccine (5 - 2023-24 season) 09/01/2023 2/10/2021    HIV Screening 12/19/2028 (Originally 8/21/1995) ---    Colonoscopy 08/21/2025 7/2/2013    Lipid Panel 12/19/2027 12/19/2022    TETANUS VACCINE 03/21/2032 3/21/2022    Override on 1/1/2011: Done              ROS:  CONSTITUTIONAL: Denies weight change,  fatigue, fevers, chills, night sweats.  CARDIOVASCULAR: Denies chest pain, shortness of breath, orthopnea and edema.  RESPIRATORY: Denies cough, hemoptysis, dyspnea, and wheezing.  GI: See HPI.    Medical History:   Past Medical History:   Diagnosis Date    Colon polyp 2008 2013 clear resume at 10 year interval.     GERD (gastroesophageal reflux disease)     Seasonal allergies        Surgical History:   Past Surgical History:   Procedure Laterality Date    COLONOSCOPY      VASECTOMY      wisdoom teeth         Family History:   Family History   Problem Relation Age of Onset    Diabetes Mother     Hyperlipidemia Father     No Known Problems Sister     No Known Problems Brother     No Known Problems Maternal Aunt     No Known Problems Maternal Uncle     No Known Problems Paternal Aunt     No Known Problems Paternal Uncle     No Known Problems Maternal Grandmother     No Known Problems Maternal Grandfather     COPD Paternal Grandmother     No Known Problems Paternal Grandfather     Cancer Paternal Aunt        Social History:   Social History     Tobacco Use     Smoking status: Former     Types: Cigarettes    Smokeless tobacco: Former     Types: Chew   Substance Use Topics    Alcohol use: Yes     Alcohol/week: 1.0 - 2.0 standard drink of alcohol     Types: 1 - 2 Cans of beer per week     Comment: 2x monthly    Drug use: No       Allergies:   Review of patient's allergies indicates:  No Known Allergies    Medications:   No current facility-administered medications on file prior to encounter.     Current Outpatient Medications on File Prior to Encounter   Medication Sig Dispense Refill    famotidine (PEPCID) 20 MG tablet Take 1 tablet (20 mg total) by mouth 2 (two) times daily. 180 tablet 3       Physical Exam:  Vital Signs:   Vitals:    12/27/23 1047   BP: 122/73   Pulse: (!) 55   Resp: 16   Temp: 97.4 °F (36.3 °C)     General Appearance: Well appearing in no acute distress  ENT: OP clear  Chest: CTA B  CV: RRR, no m/r/g  Abd: s/nt/nd/nabs  Ext: no edema    Labs:Reviewed    IMP:  Active Hospital Problems    Diagnosis  POA    *Personal history of colonic polyps [Z86.010]  Not Applicable      Resolved Hospital Problems   No resolved problems to display.         Plan:   I have explained the risks and benefits of colonoscopy to the patient including but not limited to bleeding, perforation, infection, and death. The patient wishes to proceed.

## 2023-12-27 NOTE — ANESTHESIA POSTPROCEDURE EVALUATION
Anesthesia Post Evaluation    Patient: Sacha Fregoso    Procedure(s) Performed: Procedure(s) (LRB):  COLONOSCOPY (N/A)    Final Anesthesia Type: general      Patient location during evaluation: PACU  Patient participation: Yes- Able to Participate  Level of consciousness: awake and alert and oriented  Post-procedure vital signs: reviewed and stable  Pain management: adequate  Airway patency: patent    PONV status at discharge: No PONV  Anesthetic complications: no      Cardiovascular status: blood pressure returned to baseline, stable and hemodynamically stable  Respiratory status: unassisted  Hydration status: euvolemic  Follow-up not needed.              Vitals Value Taken Time   /62 12/27/23 1251   Temp 36.4 °C (97.6 °F) 12/27/23 1231   Pulse 54 12/27/23 1251   Resp 17 12/27/23 1251   SpO2 99 % 12/27/23 1251         No case tracking events are documented in the log.      Pain/Sagar Score: Sagar Score: 8 (12/27/2023 12:31 PM)

## 2023-12-27 NOTE — DISCHARGE SUMMARY
[facility] - End[facility] 1st Fl  Discharge Note  Short Stay    Procedure(s) (LRB):  COLONOSCOPY (N/A)      OUTCOME: Patient tolerated treatment/procedure well without complication and is now ready for discharge.    DISPOSITION: Home or Self Care    FINAL DIAGNOSIS:  Personal history of colonic polyps    FOLLOWUP: With primary care provider    DISCHARGE INSTRUCTIONS:  No discharge procedures on file.

## 2023-12-27 NOTE — [employer] PATIENT INSTRUCTIONS
Discharge Summary/Instructions after an Endoscopic Procedure  Patient Name: Sacha Fregoso  Patient MRN: [id number]  Patient YOB: 1980 Wednesday, December 27, 2023  Jennifer Mcfarland MD  Dear patient,  As a result of recent federal legislation (The Federal Cures Act), you may   receive lab or pathology results from your procedure in your [facility]   account before your physician is able to contact you. Your physician or   their representative will relay the results to you with their   recommendations at their soonest availability.  Thank you,  RESTRICTIONS:  During your procedure today, you received medications for sedation.  These   medications may affect your judgment, balance and coordination.  Therefore,   for 24 hours, you have the following restrictions:   - DO NOT drive a car, operate machinery, make legal/financial decisions,   sign important papers or drink alcohol.    ACTIVITY:  Today: no heavy lifting, straining or running due to procedural   sedation/anesthesia.  The following day: return to full activity including work.  DIET:  Eat and drink normally unless instructed otherwise.     TREATMENT FOR COMMON SIDE EFFECTS:  - Mild abdominal pain, nausea, belching, bloating or excessive gas:  rest,   eat lightly and use a heating pad.  - Sore Throat: treat with throat lozenges and/or gargle with warm salt   water.  - Because air was used during the procedure, expelling large amounts of air   from your rectum or belching is normal.  - If a bowel prep was taken, you may not have a bowel movement for 1-3 days.    This is normal.  SYMPTOMS TO WATCH FOR AND REPORT TO YOUR PHYSICIAN:  1. Abdominal pain or bloating, other than gas cramps.  2. Chest pain.  3. Back pain.  4. Signs of infection such as: chills or fever occurring within 24 hours   after the procedure.  5. Rectal bleeding, which would show as bright red, maroon, or black stools.   (A tablespoon of blood from the rectum is not serious,  especially if   hemorrhoids are present.)  6. Vomiting.  7. Weakness or dizziness.  GO DIRECTLY TO THE NEAREST EMERGENCY ROOM IF YOU HAVE ANY OF THE FOLLOWING:      Difficulty breathing              Chills and/or fever over 101 F   Persistent vomiting and/or vomiting blood   Severe abdominal pain   Severe chest pain   Black, tarry stools   Bleeding- more than one tablespoon   Any other symptom or condition that you feel may need urgent attention  Your doctor recommends these additional instructions:  If any biopsies were taken, your doctors clinic will contact you in 1 to 2   weeks with any results.  - Discharge patient to home (via wheelchair).   - Resume previous diet.   - Continue present medications.   - Repeat colonoscopy in 5 years for surveillance.   - Patient has a contact number available for emergencies.  The signs and   symptoms of potential delayed complications were discussed with the   patient.  Return to normal activities tomorrow.  Written discharge   instructions were provided to the patient.  For questions, problems or results please call your physician Jennifer Mcfarland MD at Work:  [phone]  If you have any questions about the above instructions, call the GI   department at [phone] or call the endoscopy unit at [phone]   from 7am until 3 pm.  [facility], EMERGENCY ROOM PHONE NUMBER:   [phone]  IF A COMPLICATION OR EMERGENCY SITUATION ARISES AND YOU ARE UNABLE TO REACH   YOUR PHYSICIAN - GO DIRECTLY TO THE EMERGENCY ROOM.  I have read or have had read to me these discharge instructions for my   procedure and have received a written copy.  I understand these   instructions and will follow-up with my physician if I have any questions.     __________________________________       _____________________________________  Nurse Signature                                          Patient/Designated   Responsible Party Signature  Jennifer Mcfarland  MD Jennifer Mcfarland MD  12/27/2023 12:32:01 PM  [employer]

## 2023-12-27 NOTE — PLAN OF CARE
Discharge instructions reviewed with pt and spouse, handouts given, verbalized understanding with no further questions at this time. Dr. Mcfarland spoke to pt at bedside, reviewed procedure and answered questions with MD telephone number provided per AVS sheet. VSS on RA, no pain or nausea noted, tolerating po fluids without difficulty, no other complaints noted. Fall precautions reviewed, consents in chart, PIV removed.

## 2023-12-27 NOTE — TRANSFER OF CARE
"Anesthesia Transfer of Care Note    Patient: Sacha Fregoso    Procedure(s) Performed: Procedure(s) (LRB):  COLONOSCOPY (N/A)    Patient location: PACU    Anesthesia Type: general    Transport from OR: Transported from OR on room air with adequate spontaneous ventilation    Post pain: adequate analgesia    Post assessment: no apparent anesthetic complications    Post vital signs: stable    Level of consciousness: sedated    Nausea/Vomiting: no nausea/vomiting    Complications: none    Transfer of care protocol was followed      Last vitals: Visit Vitals  /73 (BP Location: Right arm, Patient Position: Sitting)   Pulse (!) 55   Temp 36.3 °C (97.4 °F) (Temporal)   Resp 16   Ht 5' 11" (1.803 m)   Wt 80.2 kg (176 lb 12.9 oz)   SpO2 99%   BMI 24.66 kg/m²     "

## 2023-12-28 ENCOUNTER — CLINICAL SUPPORT (OUTPATIENT)
Dept: REHABILITATION | Facility: HOSPITAL | Age: 43
End: 2023-12-28
Payer: OTHER GOVERNMENT

## 2023-12-28 DIAGNOSIS — M17.11 PRIMARY OSTEOARTHRITIS OF RIGHT KNEE: ICD-10-CM

## 2023-12-28 DIAGNOSIS — R29.898 RIGHT LEG WEAKNESS: Primary | ICD-10-CM

## 2023-12-28 DIAGNOSIS — M22.2X1 PATELLOFEMORAL PAIN SYNDROME OF RIGHT KNEE: ICD-10-CM

## 2023-12-28 DIAGNOSIS — M25.551 RIGHT HIP PAIN: ICD-10-CM

## 2023-12-28 PROCEDURE — 97110 THERAPEUTIC EXERCISES: CPT | Performed by: PHYSICAL THERAPIST

## 2023-12-28 PROCEDURE — 97112 NEUROMUSCULAR REEDUCATION: CPT | Performed by: PHYSICAL THERAPIST

## 2023-12-28 PROCEDURE — 97140 MANUAL THERAPY 1/> REGIONS: CPT | Performed by: PHYSICAL THERAPIST

## 2023-12-28 NOTE — PROGRESS NOTES
[facility]   Physical Therapy Treatment Note      Name: Sacha Fregoso  Clinic Number: [id number]    Therapy Diagnosis:   Encounter Diagnoses   Name Primary?    Right leg weakness Yes    Patellofemoral pain syndrome of right knee     Primary osteoarthritis of right knee     Right hip pain      Physician: John Ferguson MD     Physician Orders: PT Eval and Treat   Medical Diagnosis from Referral: Right hip pain [M25.551], Primary osteoarthritis of right knee [M17.11], Patellofemoral pain syndrome of right knee [M22.2X1]   Evaluation Date: 12/20/2023  Authorization Period Expiration: 4/12/24  Plan of Care Expiration: 3/20/24  Progress Note Due: 1/20/24  Visit # / Visits authorized: 1/ 15     Time In: 1101  Time Out: 1200  Total Billable Time: 60 minutes Billing reflects 1:1 direct supervision    MD follow-up:    Precautions: Standard    FOTO:  Goal: 72%  -Intake: 59%  -Status: incomplete  -Discharge: incomplete    Subjective     Pt reports: No new complaints..  He was compliant with home exercise program.  Response to previous treatment: No significant change  Functional change: Improving function    Pain: Minimal/10  Location: right hip and knee (*slump, sidebend)    Objective      Objective Measures updated at progress report unless specified otherwise.    Primary Impairments: Dominant hamstring, knee valgus,     - Slump test  Impaired R rotation    Treatment     Sacha received the treatments listed below:      Sacha received the following manual therapy techniques: Joint mobilizations and Soft tissue Mobilization were applied to the: lower quarter for 10 minutes, including:       Performed Today     Mobility assessment x  Impaired mid thoracic mobility    Joint mobilization  x  X  x R lumbar manipulation  Mid thoracic manipualtion supine  Knee medial gaping mobilization   Soft tissue mobilization       Dry needling          Plan for Next Visit: Continue as indicated          Sacha received therapeutic exercises to develop strength, endurance, ROM, flexibility, posture, and core stabilization for 20 minutes including:     Education regarding tendon loading protocol x 10 minutes       Performed Today     Strength / range of motion assessment x  Impaired R spine rotaiton  Pain with abductor load/sidebend            Flexibility/mobility/ROM       Quad stretch        Thoracic rotation  x 15x ea    Thoracic mobilizations     Strength       Quad       Hamstring       Calf          Plan for Next Visit: See above         Sacha participated in neuromuscular re-education activities to improve: Balance, Coordination, Kinesthetic, Sense, Proprioception, and Posture for 30 minutes. The following activities were included:          Performed Today     Motor control/posture       Hip abductor  x  X  x  CC hip abduction 7.5#  Side plank abduction  Lateral band walks   Lateral step down  x  3 x 10 ea   Glute  x  Triped 15x ea           Balance/proprioception                          Plan for Next Visit: See above         Sacha participated in dynamic functional therapeutic activities to improve functional performance for 00  minutes, including:          Performed Today     Functional strength                               Power                  Plan for Next Visit: See above          Patient Education and Home Exercises         Education provided:   Hip abductor loading progression    Written Home Exercises Provided: Patient instructed to cont prior HEP.  Exercises were reviewed and Sacha was able to demonstrate them prior to the end of the session.  Sacha demonstrated good  understanding of the education provided.     See EMR under Patient Instructions for exercises provided prior visit.    Assessment     12/29- Sacha Naif Fregoso tolerated PT session well with minimal  complaints of pain or discomfort. Patient continues to have hip abductor pain that would respond  well to abductor specific loading progression. Objective findings are improving with joint mobility and motor control.  Updated home exercises were issued during today's visit. Sacha demonstrated good understanding of new exercises and will continue to progress at home until next follow-up.       Sacha Is progressing well towards his goals.   Pt prognosis is Good.     Pt will continue to benefit from skilled outpatient physical therapy to address the deficits listed in the problem list box on initial evaluation, provide pt/family education and to maximize pt's level of independence in the home and community environment.     Pt's spiritual, cultural and educational needs considered and pt agreeable to plan of care and goals.    Anticipated barriers to physical therapy: chronicity of condition     Goals:     Short Term Goals:  6 weeks Status  Date Met   PAIN: Pt will report worst pain of 2/10 in order to progress toward max functional ability and improve quality of life. [x] Progressing  [] Met  [] Not Met     FUNCTION: Patient will demonstrate improved function as indicated by a functional score improvement of at least 5 points on FOTO. [x] Progressing  [] Met  [] Not Met     MOBILITY: Patient will improve AROM to 50% of stated goals, listed in objective measures above, in order to progress towards independence with functional activities.  [x] Progressing  [] Met  [] Not Met     STRENGTH: Patient will improve strength to 50% of stated goals, listed in objective measures above, in order to progress towards independence with functional activities. [x] Progressing  [] Met  [] Not Met     POSTURE: Patient will correct postural deviations in sitting and standing, to decrease pain and promote long term stability.  [x] Progressing  [] Met  [] Not Met     HEP: Patient will demonstrate independence with HEP in order to progress toward functional independence. [x] Progressing  [] Met  [] Not Met        Long Term  Goals:  12 weeks Status Date Met   PAIN: Pt will report worst pain of 1/10 in order to progress toward max functional ability and improve quality of life [x] Progressing  [] Met  [] Not Met     FUNCTION: Patient will demonstrate improved function as indicated by a FOTO functional score improvement as listed in header. [x] Progressing  [] Met  [] Not Met     MOBILITY: Patient will improve AROM to stated goals, listed in objective measures above, in order to return to maximal functional potential and improve quality of life.  [x] Progressing  [] Met  [] Not Met     STRENGTH: Patient will improve strength to stated goals, listed in objective measures above, in order to improve functional independence and quality of life.  [x] Progressing  [] Met  [] Not Met     GAIT: Patient will demonstrate normalized gait mechanics with minimal compensation in order to return to PLOF. [x] Progressing  [] Met  [] Not Met     Patient will return to normal ADL's, IADL's, community involvement, recreational activities, and work-related activities with less than or equal to 1/10 pain and maximal function.  [x] Progressing  [] Met  [] Not Met          Plan   Plan of care Certification: 12/20/2023 to 3/20/24.     Continue with abductor loading progression  Continued to address movement deficits      Jv Pedro, PT

## 2024-01-04 ENCOUNTER — PATIENT MESSAGE (OUTPATIENT)
Dept: PRIMARY CARE CLINIC | Facility: CLINIC | Age: 44
End: 2024-01-04
Payer: OTHER GOVERNMENT

## 2024-01-04 DIAGNOSIS — Z12.11 COLON CANCER SCREENING: Primary | ICD-10-CM

## 2024-01-05 ENCOUNTER — CLINICAL SUPPORT (OUTPATIENT)
Dept: REHABILITATION | Facility: HOSPITAL | Age: 44
End: 2024-01-05
Payer: OTHER GOVERNMENT

## 2024-01-05 DIAGNOSIS — M25.551 RIGHT HIP PAIN: ICD-10-CM

## 2024-01-05 DIAGNOSIS — M22.2X1 PATELLOFEMORAL PAIN SYNDROME OF RIGHT KNEE: ICD-10-CM

## 2024-01-05 DIAGNOSIS — R29.898 RIGHT LEG WEAKNESS: Primary | ICD-10-CM

## 2024-01-05 DIAGNOSIS — M17.11 PRIMARY OSTEOARTHRITIS OF RIGHT KNEE: ICD-10-CM

## 2024-01-05 PROCEDURE — 97110 THERAPEUTIC EXERCISES: CPT | Performed by: PHYSICAL THERAPIST

## 2024-01-05 PROCEDURE — 97112 NEUROMUSCULAR REEDUCATION: CPT | Performed by: PHYSICAL THERAPIST

## 2024-01-05 PROCEDURE — 97140 MANUAL THERAPY 1/> REGIONS: CPT | Performed by: PHYSICAL THERAPIST

## 2024-01-05 NOTE — TELEPHONE ENCOUNTER
I have signed for the following orders AND/OR meds.  Please call the patient and ask the patient to schedule the testing AND/OR inform about any medications that were sent.      Orders Placed This Encounter   Procedures    Ambulatory referral/consult to Endo Procedure      Standing Status:   Future     Standing Expiration Date:   7/31/2024     Referral Priority:   Routine     Referral Type:   Consultation     Referred to Provider:   Jennifer Mcfarland MD     Number of Visits Requested:   1

## 2024-01-05 NOTE — PROGRESS NOTES
[facility]   Physical Therapy Treatment Note      Name: Sacha Fregoso  Clinic Number: [id number]    Therapy Diagnosis:   Encounter Diagnoses   Name Primary?    Right leg weakness Yes    Patellofemoral pain syndrome of right knee     Primary osteoarthritis of right knee     Right hip pain      Physician: John Ferguson MD     Physician Orders: PT Eval and Treat   Medical Diagnosis from Referral: Right hip pain [M25.551], Primary osteoarthritis of right knee [M17.11], Patellofemoral pain syndrome of right knee [M22.2X1]   Evaluation Date: 12/20/2023  Authorization Period Expiration: 4/12/24  Plan of Care Expiration: 3/20/24  Progress Note Due: 1/20/24  Visit # / Visits authorized: 1/ 15     Time In: 0803  Time Out: 0905  Total Billable Time: 60 minutes Billing reflects 1:1 direct supervision    MD follow-up:    Precautions: Standard    FOTO:  Goal: 72%  -Intake: 59%  -Status: incomplete  -Discharge: incomplete    Subjective     Pt reports: He ran this morning without significant issues.  He was compliant with home exercise program.  Response to previous treatment: No significant change  Functional change: Improving function    Pain: Minimal/10  Location: right hip and knee (*slump, sidebend)    Objective      Objective Measures updated at progress report unless specified otherwise.    Primary Impairments: Dominant hamstring, knee valgus, knee lateral glide syndrome    - Slump test  Symmetrical rotation today        Treatment     Sacha received the treatments listed below:      Sacha received the following manual therapy techniques: Joint mobilizations and Soft tissue Mobilization were applied to the: lower quarter for 10 minutes, including:       Performed Today     Mobility assessment x  Impaired mid thoracic mobility    Joint mobilization  x  X  x R lumbar manipulation  Mid thoracic manipualtion supine  Knee medial gaping mobilization   Soft tissue mobilization        Dry needling          Plan for Next Visit: Continue as indicated         Sacha received therapeutic exercises to develop strength, endurance, ROM, flexibility, posture, and core stabilization for 20 minutes including:     Education regarding tendon loading protocol x 10 minutes       Performed Today     Strength / range of motion assessment x  Impaired R spine rotaiton  Pain with abductor load/sidebend            Flexibility/mobility/ROM       Quad stretch        Thoracic rotation  x 15x ea    Thoracic mobilizations     Strength       Quad x  Extension machine 3 x 10    Hamstring  x  Hamstring curl 3 x 10   Calf          Plan for Next Visit: See above         Sacha participated in neuromuscular re-education activities to improve: Balance, Coordination, Kinesthetic, Sense, Proprioception, and Posture for 25 minutes. The following activities were included:          Performed Today     Motor control/posture       Hip abductor  x  X  x  CC hip abduction 7.5#  Side plank abduction  Lateral band walks   Lateral step down  x  3 x 10 ea   Glute  x  Triped 15x ea           Balance/proprioception                          Plan for Next Visit: See above         Sacha participated in dynamic functional therapeutic activities to improve functional performance for 5minutes, including:          Performed Today     Functional strength        Turkmen split squat x 20# Dbs 3 x 10                   Power                  Plan for Next Visit: See above          Patient Education and Home Exercises         Education provided:   Hip abductor loading progression    Written Home Exercises Provided: Patient instructed to cont prior HEP.  Exercises were reviewed and Sacha was able to demonstrate them prior to the end of the session.  Sacha demonstrated good  understanding of the education provided.     See EMR under Patient Instructions for exercises provided prior visit.    Assessment     1/5- Sacha Disla  Ildefonso tolerated PT session well with no  complaints of pain or discomfort. Patient continues to have mild knee pain with deep knee flexion that was improved with knee medial glide. Objective findings are improving with range of motion, joint mobility, and motor control.  Updated home exercises were not issued during today's visit. Sacha demonstrated good understanding of new exercises and will continue to progress at home until next follow-up.         Sacha Is progressing well towards his goals.   Pt prognosis is Good.     Pt will continue to benefit from skilled outpatient physical therapy to address the deficits listed in the problem list box on initial evaluation, provide pt/family education and to maximize pt's level of independence in the home and community environment.     Pt's spiritual, cultural and educational needs considered and pt agreeable to plan of care and goals.    Anticipated barriers to physical therapy: chronicity of condition     Goals:     Short Term Goals:  6 weeks Status  Date Met   PAIN: Pt will report worst pain of 2/10 in order to progress toward max functional ability and improve quality of life. [x] Progressing  [] Met  [] Not Met     FUNCTION: Patient will demonstrate improved function as indicated by a functional score improvement of at least 5 points on FOTO. [x] Progressing  [] Met  [] Not Met     MOBILITY: Patient will improve AROM to 50% of stated goals, listed in objective measures above, in order to progress towards independence with functional activities.  [x] Progressing  [] Met  [] Not Met     STRENGTH: Patient will improve strength to 50% of stated goals, listed in objective measures above, in order to progress towards independence with functional activities. [x] Progressing  [] Met  [] Not Met     POSTURE: Patient will correct postural deviations in sitting and standing, to decrease pain and promote long term stability.  [x] Progressing  [] Met  [] Not Met     HEP:  Patient will demonstrate independence with HEP in order to progress toward functional independence. [x] Progressing  [] Met  [] Not Met        Long Term Goals:  12 weeks Status Date Met   PAIN: Pt will report worst pain of 1/10 in order to progress toward max functional ability and improve quality of life [x] Progressing  [] Met  [] Not Met     FUNCTION: Patient will demonstrate improved function as indicated by a FOTO functional score improvement as listed in header. [x] Progressing  [] Met  [] Not Met     MOBILITY: Patient will improve AROM to stated goals, listed in objective measures above, in order to return to maximal functional potential and improve quality of life.  [x] Progressing  [] Met  [] Not Met     STRENGTH: Patient will improve strength to stated goals, listed in objective measures above, in order to improve functional independence and quality of life.  [x] Progressing  [] Met  [] Not Met     GAIT: Patient will demonstrate normalized gait mechanics with minimal compensation in order to return to PLOF. [x] Progressing  [] Met  [] Not Met     Patient will return to normal ADL's, IADL's, community involvement, recreational activities, and work-related activities with less than or equal to 1/10 pain and maximal function.  [x] Progressing  [] Met  [] Not Met          Plan   Plan of care Certification: 12/20/2023 to 3/20/24.     Continue with abductor loading progression  Continued to address movement deficits      Jv Pedro, PT

## 2024-01-08 ENCOUNTER — OFFICE VISIT (OUTPATIENT)
Dept: UROLOGY | Facility: CLINIC | Age: 44
End: 2024-01-08
Payer: OTHER GOVERNMENT

## 2024-01-08 ENCOUNTER — LAB VISIT (OUTPATIENT)
Dept: LAB | Facility: HOSPITAL | Age: 44
End: 2024-01-08
Attending: NURSE PRACTITIONER
Payer: OTHER GOVERNMENT

## 2024-01-08 VITALS
HEART RATE: 73 BPM | WEIGHT: 176.81 LBS | BODY MASS INDEX: 24.75 KG/M2 | SYSTOLIC BLOOD PRESSURE: 123 MMHG | HEIGHT: 71 IN | DIASTOLIC BLOOD PRESSURE: 79 MMHG

## 2024-01-08 DIAGNOSIS — N40.0 BENIGN PROSTATIC HYPERPLASIA WITHOUT LOWER URINARY TRACT SYMPTOMS: Primary | ICD-10-CM

## 2024-01-08 DIAGNOSIS — Z12.5 PROSTATE CANCER SCREENING: ICD-10-CM

## 2024-01-08 DIAGNOSIS — N40.0 BENIGN PROSTATIC HYPERPLASIA WITHOUT LOWER URINARY TRACT SYMPTOMS: ICD-10-CM

## 2024-01-08 DIAGNOSIS — R35.1 NOCTURIA: ICD-10-CM

## 2024-01-08 PROBLEM — H93.12 TINNITUS OF LEFT EAR: Status: RESOLVED | Noted: 2022-12-19 | Resolved: 2024-01-08

## 2024-01-08 PROBLEM — R29.898 RIGHT LEG WEAKNESS: Status: RESOLVED | Noted: 2020-08-17 | Resolved: 2024-01-08

## 2024-01-08 PROCEDURE — 84153 ASSAY OF PSA TOTAL: CPT | Performed by: NURSE PRACTITIONER

## 2024-01-08 PROCEDURE — 99213 OFFICE O/P EST LOW 20 MIN: CPT | Mod: PBBFAC | Performed by: NURSE PRACTITIONER

## 2024-01-08 PROCEDURE — 99204 OFFICE O/P NEW MOD 45 MIN: CPT | Mod: S$PBB,,, | Performed by: NURSE PRACTITIONER

## 2024-01-08 PROCEDURE — 99999 PR PBB SHADOW E&M-EST. PATIENT-LVL III: CPT | Mod: PBBFAC,,, | Performed by: NURSE PRACTITIONER

## 2024-01-08 PROCEDURE — 36415 COLL VENOUS BLD VENIPUNCTURE: CPT | Performed by: NURSE PRACTITIONER

## 2024-01-08 NOTE — PROGRESS NOTES
Chief Complaint:   Prostate cancer screening  BPH    HPI:   Patient is a delightful 43-year-old male that is presenting with nocturia 2 times nightly in fairly strong urinary stream.  No BPH meds.  Urine in clinic is negative and PVR is 3 mL.  In reviewing EMR, all normal PSAs, is due for PSA.  No prostate cancers in his family.  Denies gross hematuria.  States he drinks water in [place] 0 throughout the day.    Allergies:  Patient has no known allergies.    Medications:  has a current medication list which includes the following prescription(s): diclofenac and famotidine.    Review of Systems:  General: No fever, chills, fatigability, or weight loss.  Skin: No rashes, itching, or changes in color or texture of skin.  Chest: Denies CASTANON, cyanosis, wheezing, cough, and sputum production.  Abdomen: Appetite fine. No weight loss. Denies diarrhea, abdominal pain, hematemesis, or blood in stool.  Musculoskeletal: No joint stiffness or swelling. Denies back pain.  : As above.  All other review of systems negative.    PMH:   has a past medical history of Colon polyp (2008), GERD (gastroesophageal reflux disease), and Seasonal allergies.    PSH:   has a past surgical history that includes wisdoom teeth; Colonoscopy; Vasectomy; and Colonoscopy (N/A, 12/27/2023).    FamHx: family history includes COPD in his paternal grandmother; Cancer in his paternal aunt; Diabetes in his mother; Hyperlipidemia in his father; No Known Problems in his brother, maternal aunt, maternal grandfather, maternal grandmother, maternal uncle, paternal aunt, paternal grandfather, paternal uncle, and sister.    SocHx:  reports that he has quit smoking. His smoking use included cigarettes. He has quit using smokeless tobacco.  His smokeless tobacco use included chew. He reports current alcohol use of about 1.0 - 2.0 standard drink of alcohol per week. He reports that he does not use drugs.      Physical Exam:  Vitals:    01/08/24 1409   BP: 123/79    Pulse: 73     General: A&Ox3, no apparent distress, no deformities  Neck: No masses, normal thyroid  Lungs: normal inspiration, no use of accessory muscles  Heart: normal pulse, no arrhythmias  Abdomen: Soft, NT, ND, no masses, no hernias, no hepatosplenomegaly  Lymphatic: Neck and groin nodes negative  Skin: The skin is warm and dry. No jaundice.  Ext: No c/c/e.  : Test desc lacy, no abnormalities of epididymus. Penis with normal penile and scrotal skin. Meatus normal. Normal rectal tone, no hemorrhoids. Prost 30 gm no nodules or masses appreciated. SV not palpable. Perineum and anus normal.    Labs/Studies:   See HPI   Latest Reference Range & Units 09/13/21 08:00 12/19/22 09:37   Prostate Specific Antigen 0.00 - 4.00 ng/mL 0.60 0.80     Impression/Plan:   1. Prostate cancer screening  Exam was unremarkable and patient was sent for PSA.  I will contact him with results and needed follow-up.    2. BPH  Patient was educated on behavior modifications needed to decrease BPH symptoms.  Does not want to consider tamsulosin at this time, uses the patient portal, was instructed to contact me if he desires to proceed with tamsulosin.  Otherwise if PSA is normal, will return to clinic in

## 2024-01-09 LAB — COMPLEXED PSA SERPL-MCNC: 0.57 NG/ML (ref 0–4)

## 2024-01-10 ENCOUNTER — CLINICAL SUPPORT (OUTPATIENT)
Dept: REHABILITATION | Facility: HOSPITAL | Age: 44
End: 2024-01-10
Payer: OTHER GOVERNMENT

## 2024-01-10 DIAGNOSIS — M22.2X1 PATELLOFEMORAL PAIN SYNDROME OF RIGHT KNEE: Primary | ICD-10-CM

## 2024-01-10 DIAGNOSIS — M25.551 RIGHT HIP PAIN: ICD-10-CM

## 2024-01-10 DIAGNOSIS — M17.11 PRIMARY OSTEOARTHRITIS OF RIGHT KNEE: ICD-10-CM

## 2024-01-10 PROCEDURE — 97110 THERAPEUTIC EXERCISES: CPT | Performed by: PHYSICAL THERAPIST

## 2024-01-10 PROCEDURE — 97530 THERAPEUTIC ACTIVITIES: CPT | Performed by: PHYSICAL THERAPIST

## 2024-01-10 NOTE — PROGRESS NOTES
"  [facility]   Physical Therapy Treatment Note      Name: Sacha Fregoso  Clinic Number: [id number]    Therapy Diagnosis:   Encounter Diagnoses   Name Primary?    Patellofemoral pain syndrome of right knee Yes    Primary osteoarthritis of right knee     Right hip pain        Physician: John Ferguson MD     Physician Orders: PT Eval and Treat   Medical Diagnosis from Referral: Right hip pain [M25.551], Primary osteoarthritis of right knee [M17.11], Patellofemoral pain syndrome of right knee [M22.2X1]   Evaluation Date: 12/20/2023  Authorization Period Expiration: 4/12/24  Plan of Care Expiration: 3/20/24  Progress Note Due: 1/20/24  Visit # / Visits authorized: 2/ 15     Time In: 1401  Time Out: 1500  Total Billable Time: 45 minutes Billing reflects 1:1 direct supervision    MD follow-up:    Precautions: Standard    FOTO:  Goal: 72%  -Intake: 59%  -Status: incomplete  -Discharge: incomplete    Subjective     Pt reports: He ran this morning without significant issues.  He was compliant with home exercise program.  Response to previous treatment: No significant change  Functional change: Improving function    Pain: Minimal/10  Location: right hip and knee (*slump, sidebend)    Objective      Objective Measures updated at progress report unless specified otherwise.    Primary Impairments: Dominant hamstring, knee valgus, knee lateral glide syndrome    - Slump test  Symmetrical rotation today    Running gait  L increased pronation during midstance compared to R  Minimal increase in R lateral trunk lean during mid stance  Appropriate foot/tibial inclination angle  Symmetrical CPD   Symmetrical knee flexion during midstance  Increased medial heel whip angle during swing on L  Forward head noted during midstance    Asymmetrical femoral/tibial torsion noted    Knee to wall  3" on R  5" on L    Hip abduction  L- 28#  R- 24#    Quad  L 180#  R 205#    Hamstring  L- 62.9  R- " 53.3    Treatment     Sacha received the treatments listed below:      Sacha received the following manual therapy techniques: Joint mobilizations and Soft tissue Mobilization were applied to the: lower quarter for 00 minutes, including:       Performed Today     Mobility assessment  Impaired mid thoracic mobility    Joint mobilization  R lumbar manipulation  Mid thoracic manipualtion supine  Knee medial gaping mobilization   Soft tissue mobilization       Dry needling          Plan for Next Visit: Continue as indicated         Sacha received therapeutic exercises to develop strength, endurance, ROM, flexibility, posture, and core stabilization for 15 minutes including:       Performed Today     Strength / range of motion assessment x See above           Flexibility/mobility/ROM       Dynamic warm up X* Treadmill x 5 minutes  Dynamic stretches x 5 minutes   Quad stretch        Thoracic rotation   15x ea    Thoracic mobilizations     Strength       Quad       Hamstring       Calf          Plan for Next Visit: See above         Sacha participated in neuromuscular re-education activities to improve: Balance, Coordination, Kinesthetic, Sense, Proprioception, and Posture for 30 minutes. The following activities were included:          Performed Today     Motor control/posture       Hip abductor   CC hip abduction 7.5#  Side plank abduction  Lateral band walks   Lateral step down   3 x 10 ea   Glute   Triped 15x ea           Balance/proprioception                          Plan for Next Visit: See above         Sacha participated in dynamic functional therapeutic activities to improve functional performance for 00  minutes, including:     Running gait analysis and retraining x 30 minutes- see findings above       Performed Today     Functional strength                               Power                  Plan for Next Visit: See above          Patient Education and Home Exercises         Education  provided:   Hip abductor loading progression    Written Home Exercises Provided: Patient instructed to cont prior HEP.  Exercises were reviewed and Sacha was able to demonstrate them prior to the end of the session.  Sacha demonstrated good  understanding of the education provided.     See EMR under Patient Instructions for exercises provided prior visit.    Assessment     1/10- Sacha Fregoso tolerated PT session well with no  complaints of pain or discomfort. Patient continues to have intermittent R knee and hip symptoms. Patient noted to have minor asymmetries in running gait that may account for symptoms, but nothing highly suspect. Strength deficits noted in lateral hip and hamstring. Objective findings are improving with range of motion and symptoms.  Updated home exercises were not issued during today's visit. Sacha demonstrated good understanding of new exercises and will continue to progress at home until next follow-up.         Sacha Is progressing well towards his goals.   Pt prognosis is Good.     Pt will continue to benefit from skilled outpatient physical therapy to address the deficits listed in the problem list box on initial evaluation, provide pt/family education and to maximize pt's level of independence in the home and community environment.     Pt's spiritual, cultural and educational needs considered and pt agreeable to plan of care and goals.    Anticipated barriers to physical therapy: chronicity of condition     Goals:     Short Term Goals:  6 weeks Status  Date Met   PAIN: Pt will report worst pain of 2/10 in order to progress toward max functional ability and improve quality of life. [x] Progressing  [] Met  [] Not Met     FUNCTION: Patient will demonstrate improved function as indicated by a functional score improvement of at least 5 points on FOTO. [x] Progressing  [] Met  [] Not Met     MOBILITY: Patient will improve AROM to 50% of stated goals, listed in  objective measures above, in order to progress towards independence with functional activities.  [x] Progressing  [] Met  [] Not Met     STRENGTH: Patient will improve strength to 50% of stated goals, listed in objective measures above, in order to progress towards independence with functional activities. [x] Progressing  [] Met  [] Not Met     POSTURE: Patient will correct postural deviations in sitting and standing, to decrease pain and promote long term stability.  [x] Progressing  [] Met  [] Not Met     HEP: Patient will demonstrate independence with HEP in order to progress toward functional independence. [x] Progressing  [] Met  [] Not Met        Long Term Goals:  12 weeks Status Date Met   PAIN: Pt will report worst pain of 1/10 in order to progress toward max functional ability and improve quality of life [x] Progressing  [] Met  [] Not Met     FUNCTION: Patient will demonstrate improved function as indicated by a FOTO functional score improvement as listed in header. [x] Progressing  [] Met  [] Not Met     MOBILITY: Patient will improve AROM to stated goals, listed in objective measures above, in order to return to maximal functional potential and improve quality of life.  [x] Progressing  [] Met  [] Not Met     STRENGTH: Patient will improve strength to stated goals, listed in objective measures above, in order to improve functional independence and quality of life.  [x] Progressing  [] Met  [] Not Met     GAIT: Patient will demonstrate normalized gait mechanics with minimal compensation in order to return to PLOF. [x] Progressing  [] Met  [] Not Met     Patient will return to normal ADL's, IADL's, community involvement, recreational activities, and work-related activities with less than or equal to 1/10 pain and maximal function.  [x] Progressing  [] Met  [] Not Met          Plan   Plan of care Certification: 12/20/2023 to 3/20/24.     Continue with abductor loading progression  Continued to address  movement deficits      Jv Pedro, PT

## 2024-01-24 ENCOUNTER — CLINICAL SUPPORT (OUTPATIENT)
Dept: REHABILITATION | Facility: HOSPITAL | Age: 44
End: 2024-01-24
Payer: OTHER GOVERNMENT

## 2024-01-24 DIAGNOSIS — M17.11 PRIMARY OSTEOARTHRITIS OF RIGHT KNEE: ICD-10-CM

## 2024-01-24 DIAGNOSIS — M25.551 RIGHT HIP PAIN: ICD-10-CM

## 2024-01-24 DIAGNOSIS — M22.2X1 PATELLOFEMORAL PAIN SYNDROME OF RIGHT KNEE: Primary | ICD-10-CM

## 2024-01-24 PROCEDURE — 97110 THERAPEUTIC EXERCISES: CPT | Performed by: PHYSICAL THERAPIST

## 2024-01-24 PROCEDURE — 97530 THERAPEUTIC ACTIVITIES: CPT | Performed by: PHYSICAL THERAPIST

## 2024-01-24 PROCEDURE — 97112 NEUROMUSCULAR REEDUCATION: CPT | Performed by: PHYSICAL THERAPIST

## 2024-01-24 PROCEDURE — 97140 MANUAL THERAPY 1/> REGIONS: CPT | Performed by: PHYSICAL THERAPIST

## 2024-01-24 NOTE — PROGRESS NOTES
"  [facility]   Physical Therapy Treatment Note      Name: Sacha Fregoso  Clinic Number: [id number]    Therapy Diagnosis:   Encounter Diagnoses   Name Primary?    Patellofemoral pain syndrome of right knee Yes    Primary osteoarthritis of right knee     Right hip pain        Physician: John Ferguson MD     Physician Orders: PT Eval and Treat   Medical Diagnosis from Referral: Right hip pain [M25.551], Primary osteoarthritis of right knee [M17.11], Patellofemoral pain syndrome of right knee [M22.2X1]   Evaluation Date: 12/20/2023  Authorization Period Expiration: 4/12/24  Plan of Care Expiration: 3/20/24  Progress Note Due: 2/10/24  Visit # / Visits authorized: 3/ 15     Time In: 0801  Time Out: 0900  Total Billable Time: 46 minutes Billing reflects 1:1 direct supervision    MD follow-up:    Precautions: Standard    FOTO:  Goal: 72%  -Intake: 59%  -Status: incomplete  -Discharge: incomplete    Subjective     Pt reports: 1/24- He was able to run a 10k this weekend. Some stiffness, but he wasn't sore the next day.  He was compliant with home exercise program.  Response to previous treatment: No significant change  Functional change: Improving function    Pain: Minimal/10  Location: right hip and knee (*slump, sidebend)    Objective      Objective Measures updated at progress report unless specified otherwise.    Primary Impairments: Dominant hamstring, knee valgus, knee lateral glide syndrome    - Slump test  Symmetrical rotation today    Running gait  L increased pronation during midstance compared to R  Minimal increase in R lateral trunk lean during mid stance  Appropriate foot/tibial inclination angle  Symmetrical CPD   Symmetrical knee flexion during midstance  Increased medial heel whip angle during swing on L  Forward head noted during midstance    Asymmetrical femoral/tibial torsion noted    Knee to wall  3" on R  5" on L    Hip abduction  L- 28#  R- 24#    Quad  L 180#  R " 205#    Hamstring  L- 62.9  R- 53.3    Treatment     Sacha received the treatments listed below:      Sacha received the following manual therapy techniques: Joint mobilizations and Soft tissue Mobilization were applied to the: lower quarter for 08 minutes, including:       Performed Today     Mobility assessment  Impaired mid thoracic mobility    Joint mobilization   X    x R lumbar manipulation  Mid thoracic manipualtion supine  Knee medial gaping mobilization  Ankle manipulation   Soft tissue mobilization       Dry needling          Plan for Next Visit: Continue as indicated         Sacha received therapeutic exercises to develop strength, endurance, ROM, flexibility, posture, and core stabilization for 20 minutes including:       Performed Today     Strength / range of motion assessment x See above           Flexibility/mobility/ROM       Dynamic warm up X Treadmill x 5 minutes  Dynamic stretches x 5 minutes   Quad stretch        Thoracic rotation  x 15x ea    Thoracic mobilizations x 2 min   Ankle mobs x 20x   Strength       Quad       Hamstring       Calf          Plan for Next Visit: See above         Sacha participated in neuromuscular re-education activities to improve: Balance, Coordination, Kinesthetic, Sense, Proprioception, and Posture for 08 minutes. The following activities were included:          Performed Today     Motor control/posture       Hip abductor   x  CC hip abduction 7.5#  Side plank abduction  Lateral band walks   Lateral step down   3 x 10 ea   Glute   Triped 15x ea    Hamstring brdge x  Curl on ball    Balance/proprioception        Y taps                  Plan for Next Visit: See above         Sacha participated in dynamic functional therapeutic activities to improve functional performance for 10 minutes, including:          Performed Today     Functional strength        RDL x  Sgl RDL 35#   3 x 10    Lunge  x  30# KB 3 x 10           Power                  Plan  for Next Visit: See above          Patient Education and Home Exercises         Education provided:   Hip abductor loading progression  Adding specific exercises like ankle mobs, single leg squats, lunges with rotation before running to prime the movements    Written Home Exercises Provided: Patient instructed to cont prior HEP.  Exercises were reviewed and Sacha was able to demonstrate them prior to the end of the session.  Sacha demonstrated good  understanding of the education provided.     See EMR under Patient Instructions for exercises provided prior visit.    Assessment     1/24- Sacha Fregoso tolerated PT session well with no  complaints of pain or discomfort. Patient continues to have impaired single leg stability, ankle mobility, hip, and hamstring strength. Objective findings are improving with motor control, functional activity performance, and symptoms.  Updated home exercises were issued during today's visit. Sacha demonstrated good understanding of new exercises and will continue to progress at home until next follow-up.         Sacha Is progressing well towards his goals.   Pt prognosis is Good.     Pt will continue to benefit from skilled outpatient physical therapy to address the deficits listed in the problem list box on initial evaluation, provide pt/family education and to maximize pt's level of independence in the home and community environment.     Pt's spiritual, cultural and educational needs considered and pt agreeable to plan of care and goals.    Anticipated barriers to physical therapy: chronicity of condition     Goals:     Short Term Goals:  6 weeks Status  Date Met   PAIN: Pt will report worst pain of 2/10 in order to progress toward max functional ability and improve quality of life. [x] Progressing  [] Met  [] Not Met     FUNCTION: Patient will demonstrate improved function as indicated by a functional score improvement of at least 5 points on FOTO. [x]  Progressing  [] Met  [] Not Met     MOBILITY: Patient will improve AROM to 50% of stated goals, listed in objective measures above, in order to progress towards independence with functional activities.  [x] Progressing  [] Met  [] Not Met     STRENGTH: Patient will improve strength to 50% of stated goals, listed in objective measures above, in order to progress towards independence with functional activities. [x] Progressing  [] Met  [] Not Met     POSTURE: Patient will correct postural deviations in sitting and standing, to decrease pain and promote long term stability.  [x] Progressing  [] Met  [] Not Met     HEP: Patient will demonstrate independence with HEP in order to progress toward functional independence. [x] Progressing  [] Met  [] Not Met        Long Term Goals:  12 weeks Status Date Met   PAIN: Pt will report worst pain of 1/10 in order to progress toward max functional ability and improve quality of life [x] Progressing  [] Met  [] Not Met     FUNCTION: Patient will demonstrate improved function as indicated by a FOTO functional score improvement as listed in header. [x] Progressing  [] Met  [] Not Met     MOBILITY: Patient will improve AROM to stated goals, listed in objective measures above, in order to return to maximal functional potential and improve quality of life.  [x] Progressing  [] Met  [] Not Met     STRENGTH: Patient will improve strength to stated goals, listed in objective measures above, in order to improve functional independence and quality of life.  [x] Progressing  [] Met  [] Not Met     GAIT: Patient will demonstrate normalized gait mechanics with minimal compensation in order to return to PLOF. [x] Progressing  [] Met  [] Not Met     Patient will return to normal ADL's, IADL's, community involvement, recreational activities, and work-related activities with less than or equal to 1/10 pain and maximal function.  [x] Progressing  [] Met  [] Not Met          Plan   Plan of care  Certification: 12/20/2023 to 3/20/24.     Continue with abductor loading progression  Continued to address movement deficits      Jv Pedro, PT

## 2024-02-14 ENCOUNTER — LAB VISIT (OUTPATIENT)
Dept: LAB | Facility: HOSPITAL | Age: 44
End: 2024-02-14
Attending: FAMILY MEDICINE
Payer: OTHER GOVERNMENT

## 2024-02-14 DIAGNOSIS — Z00.01 ENCOUNTER FOR GENERAL ADULT MEDICAL EXAMINATION WITH ABNORMAL FINDINGS: ICD-10-CM

## 2024-02-14 LAB
ALBUMIN SERPL BCP-MCNC: 3.9 G/DL (ref 3.5–5.2)
ALP SERPL-CCNC: 57 U/L (ref 55–135)
ALT SERPL W/O P-5'-P-CCNC: 21 U/L (ref 10–44)
ANION GAP SERPL CALC-SCNC: 8 MMOL/L (ref 8–16)
AST SERPL-CCNC: 23 U/L (ref 10–40)
BILIRUB SERPL-MCNC: 0.8 MG/DL (ref 0.1–1)
BUN SERPL-MCNC: 18 MG/DL (ref 6–20)
CALCIUM SERPL-MCNC: 9.1 MG/DL (ref 8.7–10.5)
CHLORIDE SERPL-SCNC: 106 MMOL/L (ref 95–110)
CHOLEST SERPL-MCNC: 161 MG/DL (ref 120–199)
CHOLEST/HDLC SERPL: 3.4 {RATIO} (ref 2–5)
CO2 SERPL-SCNC: 24 MMOL/L (ref 23–29)
CREAT SERPL-MCNC: 0.9 MG/DL (ref 0.5–1.4)
EST. GFR  (NO RACE VARIABLE): >60 ML/MIN/1.73 M^2
ESTIMATED AVG GLUCOSE: 91 MG/DL (ref 68–131)
GLUCOSE SERPL-MCNC: 82 MG/DL (ref 70–110)
HBA1C MFR BLD: 4.8 % (ref 4–5.6)
HDLC SERPL-MCNC: 48 MG/DL (ref 40–75)
HDLC SERPL: 29.8 % (ref 20–50)
LDLC SERPL CALC-MCNC: 101.8 MG/DL (ref 63–159)
NONHDLC SERPL-MCNC: 113 MG/DL
POTASSIUM SERPL-SCNC: 4.7 MMOL/L (ref 3.5–5.1)
PROT SERPL-MCNC: 6.4 G/DL (ref 6–8.4)
SODIUM SERPL-SCNC: 138 MMOL/L (ref 136–145)
TRIGL SERPL-MCNC: 56 MG/DL (ref 30–150)
TSH SERPL DL<=0.005 MIU/L-ACNC: 3.12 UIU/ML (ref 0.4–4)

## 2024-02-14 PROCEDURE — 85025 COMPLETE CBC W/AUTO DIFF WBC: CPT | Performed by: FAMILY MEDICINE

## 2024-02-14 PROCEDURE — 80053 COMPREHEN METABOLIC PANEL: CPT | Performed by: FAMILY MEDICINE

## 2024-02-14 PROCEDURE — 84443 ASSAY THYROID STIM HORMONE: CPT | Performed by: FAMILY MEDICINE

## 2024-02-14 PROCEDURE — 80061 LIPID PANEL: CPT | Performed by: FAMILY MEDICINE

## 2024-02-14 PROCEDURE — 36415 COLL VENOUS BLD VENIPUNCTURE: CPT | Mod: PN | Performed by: FAMILY MEDICINE

## 2024-02-14 PROCEDURE — 83036 HEMOGLOBIN GLYCOSYLATED A1C: CPT | Performed by: FAMILY MEDICINE

## 2024-02-15 LAB
BASOPHILS # BLD AUTO: 0.02 K/UL (ref 0–0.2)
BASOPHILS NFR BLD: 0.4 % (ref 0–1.9)
DIFFERENTIAL METHOD BLD: NORMAL
EOSINOPHIL # BLD AUTO: 0.1 K/UL (ref 0–0.5)
EOSINOPHIL NFR BLD: 2.1 % (ref 0–8)
ERYTHROCYTE [DISTWIDTH] IN BLOOD BY AUTOMATED COUNT: 12.2 % (ref 11.5–14.5)
HCT VFR BLD AUTO: 42.9 % (ref 40–54)
HGB BLD-MCNC: 14.5 G/DL (ref 14–18)
IMM GRANULOCYTES # BLD AUTO: 0.02 K/UL (ref 0–0.04)
IMM GRANULOCYTES NFR BLD AUTO: 0.4 % (ref 0–0.5)
LYMPHOCYTES # BLD AUTO: 2.1 K/UL (ref 1–4.8)
LYMPHOCYTES NFR BLD: 40.2 % (ref 18–48)
MCH RBC QN AUTO: 29.7 PG (ref 27–31)
MCHC RBC AUTO-ENTMCNC: 33.8 G/DL (ref 32–36)
MCV RBC AUTO: 88 FL (ref 82–98)
MONOCYTES # BLD AUTO: 0.3 K/UL (ref 0.3–1)
MONOCYTES NFR BLD: 5.7 % (ref 4–15)
NEUTROPHILS # BLD AUTO: 2.6 K/UL (ref 1.8–7.7)
NEUTROPHILS NFR BLD: 51.2 % (ref 38–73)
NRBC BLD-RTO: 0 /100 WBC
PLATELET # BLD AUTO: 200 K/UL (ref 150–450)
PMV BLD AUTO: 11.8 FL (ref 9.2–12.9)
RBC # BLD AUTO: 4.89 M/UL (ref 4.6–6.2)
WBC # BLD AUTO: 5.13 K/UL (ref 3.9–12.7)

## 2024-03-14 ENCOUNTER — OFFICE VISIT (OUTPATIENT)
Dept: SPORTS MEDICINE | Facility: CLINIC | Age: 44
End: 2024-03-14
Payer: OTHER GOVERNMENT

## 2024-03-14 ENCOUNTER — PATIENT MESSAGE (OUTPATIENT)
Dept: SPORTS MEDICINE | Facility: CLINIC | Age: 44
End: 2024-03-14

## 2024-03-14 DIAGNOSIS — M17.11 PRIMARY OSTEOARTHRITIS OF RIGHT KNEE: ICD-10-CM

## 2024-03-14 DIAGNOSIS — M25.551 RIGHT HIP PAIN: Primary | ICD-10-CM

## 2024-03-14 DIAGNOSIS — M62.89 TENSOR FASCIA LATA SYNDROME: ICD-10-CM

## 2024-03-14 DIAGNOSIS — M22.2X1 PATELLOFEMORAL PAIN SYNDROME OF RIGHT KNEE: ICD-10-CM

## 2024-03-14 PROCEDURE — 99214 OFFICE O/P EST MOD 30 MIN: CPT | Mod: S$PBB,,, | Performed by: STUDENT IN AN ORGANIZED HEALTH CARE EDUCATION/TRAINING PROGRAM

## 2024-03-14 PROCEDURE — 99213 OFFICE O/P EST LOW 20 MIN: CPT | Mod: PBBFAC | Performed by: STUDENT IN AN ORGANIZED HEALTH CARE EDUCATION/TRAINING PROGRAM

## 2024-03-14 PROCEDURE — 99999 PR PBB SHADOW E&M-EST. PATIENT-LVL III: CPT | Mod: PBBFAC,,, | Performed by: STUDENT IN AN ORGANIZED HEALTH CARE EDUCATION/TRAINING PROGRAM

## 2024-03-14 NOTE — PATIENT INSTRUCTIONS
Assessment:  Sacha Fregoso is a 43 y.o. male with a chief complaint of Pain of the Right Knee and Pain of the Right Hip    Encounter Diagnoses   Name Primary?    Right hip pain Yes    Patellofemoral pain syndrome of right knee     Tensor fascia anderson syndrome     Primary osteoarthritis of right knee       Plan:  Resume physical therapy with Jv Douglas.  Continue diclofenac, 75 mg, twice daily with meals.    Work activity modifications given today - no max dead lifts, no sprint drugs, no 2 mi runs.  Defer corticosteroid injection at this time.    Discussed PRP injections, which would be appropriate as well given mild underlying degenerative arthritis & chondromalacia.  Need approx 1 month go get prior authorization for these.  Need to stop NSAIDs for 1 week prior to injection, and for 1 week after injection.    Follow-up: 3 months or sooner if there are any problems between now and then.    Thank you for choosing [facility] and Dr. John Ferguson for your orthopedic & sports medicine care. It is our goal to provide you with exceptional care that will help keep you healthy, active, and get you back in the game.    Please do not hesitate to reach out to us via email, phone, or MyChart with any questions, concerns, or feedback.    If you are experiencing pain/discomfort ,or have questions after 5pm and would like to be connected to the [facility] on-call team, please call this number and specify which Sports Medicine provider is treating you: [phone]

## 2024-03-14 NOTE — PROGRESS NOTES
Patient ID: Sacha Fregoso  YOB: 1980  MRN: [id number]    Chief Complaint: Pain of the Right Knee and Pain of the Right Hip    Referred By: Dr. Knowles    Occupation:       History of Present Illness: Sacha Fregoso is a 43 y.o. male who presents today with Pain of the Right Knee and Pain of the Right Hip    He was initially evaluated in our office on 12/14/23.   He was diagnosed with right knee patellofemoral pain syndrome and right hip TFL syndrome and treated with diclofenac 75mg and PT at [facility].    Today, he reports that his hip pain has improved but he continues to have persistent pain in the knee especially with lifting and running.  He is concerned about his ability to complete his [employer] Test involving a max deadlift, 2 mile run and sprint drag carry test.  There are alternate cardio events that he can complete instead if given restrictions.      HPI 12/14/23:  He complains of right anterior knee pain since 2020 without injury.  He is active in the  and loves to run.  He complains of aching and throbbing pain that worsens with running and squatting.  He uses ibuprofen and [employer].  He was treated in the past with physical therapy with good results but it has been a few years.    Past Medical History:   Past Medical History:   Diagnosis Date    Colon polyp 2008 2013 clear resume at 10 year interval.     GERD (gastroesophageal reflux disease)     Seasonal allergies      Past Surgical History:   Procedure Laterality Date    COLONOSCOPY      COLONOSCOPY N/A 12/27/2023    Procedure: COLONOSCOPY;  Surgeon: Jennifer Mcfarland MD;  Location: [facility];  Service: Endoscopy;  Laterality: N/A;    VASECTOMY      wisdoom teeth       Family History   Problem Relation Age of Onset    Diabetes Mother     Hyperlipidemia Father     No Known Problems Sister     No Known Problems Brother     No Known Problems Maternal Aunt     No Known Problems Maternal  Uncle     No Known Problems Paternal Aunt     No Known Problems Paternal Uncle     No Known Problems Maternal Grandmother     No Known Problems Maternal Grandfather     COPD Paternal Grandmother     No Known Problems Paternal Grandfather     Cancer Paternal Aunt      Social History     Socioeconomic History    Marital status:      Spouse name: Thao    Number of children: 3   Occupational History     Comment: full time [employer]   Tobacco Use    Smoking status: Former     Types: Cigarettes    Smokeless tobacco: Former     Types: Chew   Substance and Sexual Activity    Alcohol use: Yes     Alcohol/week: 1.0 - 2.0 standard drink of alcohol     Types: 1 - 2 Cans of beer per week     Comment: 2x monthly    Drug use: No    Sexual activity: Yes     Partners: Female     Birth control/protection: Partner-Vasectomy   Social History Narrative    Mom is Saranya Fregoso     Social Determinants of Health     Financial Resource Strain: Low Risk  (11/30/2023)    Overall Financial Resource Strain (CARDIA)     Difficulty of Paying Living Expenses: Not hard at all   Food Insecurity: No Food Insecurity (11/30/2023)    Hunger Vital Sign     Worried About Running Out of Food in the Last Year: Never true     Ran Out of Food in the Last Year: Never true   Transportation Needs: No Transportation Needs (11/30/2023)    PRAPARE - Transportation     Lack of Transportation (Medical): No     Lack of Transportation (Non-Medical): No   Physical Activity: Sufficiently Active (11/30/2023)    Exercise Vital Sign     Days of Exercise per Week: 5 days     Minutes of Exercise per Session: 60 min   Stress: No Stress Concern Present (11/30/2023)    [employer] - Occupational Stress Questionnaire     Feeling of Stress : Only a little   Social Connections: Unknown (11/30/2023)    Social Connection and Isolation Panel [NHANES]     Frequency of Communication with Friends and Family: Once a week     Frequency of Social  Gatherings with Friends and Family: Once a week     Active Member of Clubs or Organizations: Yes     Attends Club or Organization Meetings: 1 to 4 times per year     Marital Status:    Housing Stability: Low Risk  (11/30/2023)    Housing Stability Vital Sign     Unable to Pay for Housing in the Last Year: No     Number of Places Lived in the Last Year: 1     Unstable Housing in the Last Year: No     Medication List with Changes/Refills   Current Medications    DICLOFENAC (VOLTAREN) 75 MG EC TABLET    Take 1 tablet (75 mg total) by mouth 2 (two) times daily with meals.    FAMOTIDINE (PEPCID) 20 MG TABLET    Take 1 tablet (20 mg total) by mouth 2 (two) times daily.     Review of patient's allergies indicates:  No Known Allergies    Physical Exam:   There is no height or weight on file to calculate BMI.    Physical Exam  Detailed MSK exam:     Right Knee/Hip:  Inspection: No effusion, erythema, or ecchymosis   Palpation tenderness: Nontender to palpation knee and hip  Range of motion: 0 deg extension - 140 deg flexion  Strength:  5/5 Extension    5/5 Flexion    5-/5 Hip Abduction with TFL dominance  Stability: Stable ACL/Lachman      Stable Posterior Drawer      Stable MCL/Valgus Stress      Stable LCL/Varus Stress   Patella Exam: Negative J-sign    Negative lateral patellar tracking   Negative Patellar apprehension   Negative Patellar grind   Meniscus Exam: Negative Jorge's        Negative Thessaly's   N/V Exam:  Tibial:    Normal sensory (plantar foot)  Normal motor (FHL)    Sup Peroneal:   Normal sensory (dorsal foot)  Normal motor (Peroneals)            Deep Peroneal:   Normal sensory (1st web space)  Normal motor (EHL)    Sural:   Normal sensory (lateral foot)   Saphenous:   Normal sensory (medial lower leg)   Normal pedal pulses, warm and well perfused with capillary refill < 2 sec     Imaging:    No new imaging today.    Patient Instructions   Assessment:  Sacha Fregoso is a 43 y.o. male with a  chief complaint of Pain of the Right Knee and Pain of the Right Hip    Encounter Diagnoses   Name Primary?    Right hip pain Yes    Patellofemoral pain syndrome of right knee     Tensor fascia anderson syndrome     Primary osteoarthritis of right knee       Plan:  Resume physical therapy with Jv Douglas.  Continue diclofenac, 75 mg, twice daily with meals.    Work activity modifications given today - no max dead lifts, no sprint drugs, no 2 mi runs.  Defer corticosteroid injection at this time.    Discussed PRP injections, which would be appropriate as well given mild underlying degenerative arthritis & chondromalacia.  Need approx 1 month go get prior authorization for these.  Need to stop NSAIDs for 1 week prior to injection, and for 1 week after injection.    Follow-up: 3 months or sooner if there are any problems between now and then.    Thank you for choosing [facility] and Dr. John Ferguson for your orthopedic & sports medicine care. It is our goal to provide you with exceptional care that will help keep you healthy, active, and get you back in the game.    Please do not hesitate to reach out to us via email, phone, or MyChart with any questions, concerns, or feedback.    If you are experiencing pain/discomfort ,or have questions after 5pm and would like to be connected to the [facility] on-call team, please call this number and specify which Sports Medicine provider is treating you: [phone]       A copy of today's visit note has been sent to the referring provider.           John Ferguson MD  Primary Care Sports Medicine    Disclaimer: This note was prepared using a voice recognition system and is likely to have sound alike errors within the text.

## 2024-03-18 ENCOUNTER — CLINICAL SUPPORT (OUTPATIENT)
Facility: HOSPITAL | Age: 44
End: 2024-03-18
Payer: OTHER GOVERNMENT

## 2024-03-18 DIAGNOSIS — M22.2X1 PATELLOFEMORAL PAIN SYNDROME OF RIGHT KNEE: Primary | ICD-10-CM

## 2024-03-18 DIAGNOSIS — M25.551 RIGHT HIP PAIN: ICD-10-CM

## 2024-03-18 DIAGNOSIS — M17.11 PRIMARY OSTEOARTHRITIS OF RIGHT KNEE: ICD-10-CM

## 2024-03-18 PROCEDURE — 97110 THERAPEUTIC EXERCISES: CPT | Mod: PN

## 2024-03-18 PROCEDURE — 97112 NEUROMUSCULAR REEDUCATION: CPT | Mod: PN

## 2024-03-18 NOTE — PLAN OF CARE
[facility]  Physical Therapy Plan of Care Note    Name: Sacha Fregoso  Clinic Number: [id number]    Therapy Diagnosis:   Encounter Diagnoses   Name Primary?    Patellofemoral pain syndrome of right knee Yes    Primary osteoarthritis of right knee     Right hip pain      Physician: John Ferguson MD    Visit Date: 3/18/2024    Physician Orders: PT Eval and Treat   Medical Diagnosis from Referral: Right hip pain [M25.551], Primary osteoarthritis of right knee [M17.11], Patellofemoral pain syndrome of right knee [M22.2X1]   Evaluation Date: 12/20/2023  Authorization Period Expiration: 4/12/24  Plan of Care Expiration: 6/10/24  Progress Note Due: 4/18/24  Visit # / Visits authorized: 4/14     Time In: 12:58 pm   Time Out: 1:55 pm   Total Billable Time: 40 minutes (decreased due to lack of billable staff)  Billing reflects 1:1 direct supervision     MD follow-up:     Precautions: Standard  Functional Level Prior to Evaluation:  patient experienced quite a bit of difficulty with participation in his typical high level ADL's.     SUBJECTIVE     Update:   Pt reports: continued lateral knee pain with jogging at faster paces and with lifting heavier weights.   He was compliant with home exercise program.  Response to previous treatment: tolerated treatment well.   Functional change: improved ability to participate in high level ADL's in a limited fashion.      Pain: 7/10 at worst, 2/10 currently  Location: right knee     OBJECTIVE     Update:     RANGE OF MOTION:   Lumbar ROM Right  (spine) Left    Pain/Dysfunction with Movement Goal   Lumbar Side Bending (25º) 100% 100%   100%   Lumbar Rotation 100% 100%    100%       Hip AROM/PROM Right Left Pain/Dysfunction with Movement Goal   Hip Flexion (120º) 120 120    120   Hip Extension (30º) 10 10    30   Hip IR (45º) 30 30    30   Hip ER (45º) 45 45    45       Knee AROM/PROM Right Left Pain/Dysfunction with Movement Goal   Knee Flexion  (135º) 135  (Tightness) 140    140   Knee Extension (0º) 2 0    0            STRENGTH:   L/E MMT Right  (spine) Left Pain/Dysfunction with Movement Goal   Hip Extension  4+/5 4+/5 Lumbar extension, hamstring dominant bilaterally 4+/5 B   Hip Abduction  4/5 4+/5   4+/5 B   Knee Extension 5/5 5/5 pain 5/5 B   Hip ER 5/5 5/5   4+/5 B            MUSCLE LENGTH:   Muscle Tested  Right Left  Limitation Goal   Quadriceps [] Normal  [x] Limited [x] Normal  [] Limited Rectus Femoris  Normal B        Functional Mobility   Step Down: Dysfunctional Painful   Squat: Funcitonal Nonpainful            Function:      Intake Outcome Measure for FOTO knee Survey     Therapist reviewed FOTO scores for Sacha Fregoso on 12/20/2023.   FOTO documents entered into [employer] - see Media section.     Intake Score: 59%  Updated Score: 56%          ASSESSMENT     Update: Patient has progressed well with knee pain, ROM, and strength; however, patient remains limited with knee extension ROM, hip strength, and participation in high level ADL's.  Patient will benefit from continued Physical Therapy to address remaining limitations.      Previous Short Term Goals Status:   none  New Short Term Goals Status:   met  Long Term Goal Status: continue per updated Plan of Care.   Reasons for Recertification of Therapy: to address remaining limitations.    Goals:      Short Term Goals:  6 weeks Status  Date Met   PAIN: Pt will report worst pain of 2/10 in order to progress toward max functional ability and improve quality of life. [] Progressing  [x] Met  [] Not Met  3/18/24   FUNCTION: Patient will demonstrate improved function as indicated by a functional score improvement of at least 5 points on FOTO. [] Progressing  [x] Met  [] Not Met  3/18/24   MOBILITY: Patient will improve AROM to 50% of stated goals, listed in objective measures above, in order to progress towards independence with functional activities.  [] Progressing  [x] Met  [] Not Met   3/18/24   STRENGTH: Patient will improve strength to 50% of stated goals, listed in objective measures above, in order to progress towards independence with functional activities. [x] Progressing  [x] Met  [] Not Met  3/18/24   POSTURE: Patient will correct postural deviations in sitting and standing, to decrease pain and promote long term stability.  [] Progressing  [x] Met  [] Not Met  3/18/24   HEP: Patient will demonstrate independence with HEP in order to progress toward functional independence. [] Progressing  [x] Met  [] Not Met  3/18/24      Long Term Goals:  12 weeks Status Date Met   PAIN: Pt will report worst pain of 1/10 in order to progress toward max functional ability and improve quality of life [x] Progressing  [] Met  [] Not Met     FUNCTION: Patient will demonstrate improved function as indicated by a FOTO functional score improvement as listed in header. [x] Progressing  [] Met  [] Not Met     MOBILITY: Patient will improve AROM to stated goals, listed in objective measures above, in order to return to maximal functional potential and improve quality of life.  [x] Progressing  [] Met  [] Not Met     STRENGTH: Patient will improve strength to stated goals, listed in objective measures above, in order to improve functional independence and quality of life.  [x] Progressing  [] Met  [] Not Met     GAIT: Patient will demonstrate normalized gait mechanics with minimal compensation in order to return to PLOF. [x] Progressing  [] Met  [] Not Met     Patient will return to normal ADL's, IADL's, community involvement, recreational activities, and work-related activities with less than or equal to 1/10 pain and maximal function.  [x] Progressing  [] Met  [] Not Met         PLAN     Updated Certification Period: 3/18/24 to 6/10/24   Recommended Treatment Plan: 3 times per week for 12 weeks:  Manual Therapy, Neuromuscular Re-ed, Therapeutic Activities, and Therapeutic Exercise  Other Recommendations:  none    Mo Lenz, PT, DPT, SCS    I CERTIFY THE NEED FOR THESE SERVICES FURNISHED UNDER THIS PLAN OF TREATMENT AND WHILE UNDER MY CARE  Physician's comments:      Physician's Signature: ___________________________________________________

## 2024-03-18 NOTE — PROGRESS NOTES
[facility]   Physical Therapy Treatment Note + Plan of Care     Name: Sacha Fregoso  Clinic Number: [id number]    Therapy Diagnosis:   Encounter Diagnoses   Name Primary?    Patellofemoral pain syndrome of right knee Yes    Primary osteoarthritis of right knee     Right hip pain        Physician: John Ferguson MD     Physician Orders: PT Eval and Treat   Medical Diagnosis from Referral: Right hip pain [M25.551], Primary osteoarthritis of right knee [M17.11], Patellofemoral pain syndrome of right knee [M22.2X1]   Evaluation Date: 12/20/2023  Authorization Period Expiration: 4/12/24  Plan of Care Expiration: 6/10/24  Progress Note Due: 4/18/24  Visit # / Visits authorized: 4/14    Time In: 12:58 pm   Time Out: 1:55 pm   Total Billable Time: 40 minutes (decreased due to lack of billable staff)  Billing reflects 1:1 direct supervision    MD follow-up:    Precautions: Standard    FOTO:  Goal: 72%  -Intake: 59%  -Status: 56%  -Discharge: incomplete    Subjective     Pt reports: continued lateral knee pain with jogging at faster paces and with lifting heavier weights.   He was compliant with home exercise program.  Response to previous treatment: tolerated treatment well.   Functional change: improved ability to participate in high level ADL's in a limited fashion.     Pain: 7/10 at worst, 2/10 currently  Location: right knee     Objective      Objective Measures updated at progress report unless specified otherwise.    Primary Impairments: Dominant hamstring, knee valgus, knee lateral glide syndrome    - Slump test  Symmetrical rotation today    Running gait  L increased pronation during midstance compared to R  Minimal increase in R lateral trunk lean during mid stance  Appropriate foot/tibial inclination angle  Symmetrical CPD   Symmetrical knee flexion during midstance  Increased medial heel whip angle during swing on L  Forward head noted during midstance    Asymmetrical  "femoral/tibial torsion noted    Knee to wall  3" on R  5" on L    Hip abduction  L- 28#  R- 24#    Quad  L 180#  R 205#    Hamstring  L- 62.9  R- 53.3    Treatment     Sacha received the treatments listed below:      Intervention Performed  Today Code  (see below chart) Date/Notes  3/18/24   Updated goals, measurements, and FOTO for POC   TE     Bike   TE 5'   Calf Stretch   TE Wedge: 30"x3   Prone quad stretch with strap   TE  30"x3   Hamstring Stretch   TE Seated: 30"x3   Supine Ext Stretch   TE 10# x 3'   SLR - Abd in side plank   NMR 3x10 - lacy   Clamshell in side plank    NMR 3x10 lacy    Lateral Squat Walk   NMR blue band, 2 laps                                                         0 minutes of Manual therapy (MT) to improve pain and ROM. (38700)  25 minutes of Therapeutic Exercise (TE) to develop strength, endurance, ROM, and flexibility. ([id number])  15 minutes of Neuromuscular Re-Education (NR)  to improve: Balance, Coordination, Kinesthetic, Sense, Proprioception, and Posture. (15803)  0 minutes of Therapeutic Activities (TA) to improve functional performance. (36678)         Patient Education and Home Exercises         Education provided:   Hip abductor loading progression  Adding specific exercises like ankle mobs, single leg squats, lunges with rotation before running to prime the movements    Written Home Exercises Provided: Patient instructed to cont prior HEP.  Exercises were reviewed and Sacha was able to demonstrate them prior to the end of the session.  Sacha demonstrated good  understanding of the education provided.     See EMR under Patient Instructions for exercises provided prior visit.    Assessment   Updated goals, measurements, and FOTO for Plan of Care update during Therapeutic Exercise. Added Lateral Squat Walk, Clamshell in Side Plank, and SLR - Abduction in Side Plank to improve lateral hip motor control.       Sacha Is progressing well towards his goals.   Pt " prognosis is Good.     Pt will continue to benefit from skilled outpatient physical therapy to address the deficits listed in the problem list box on initial evaluation, provide pt/family education and to maximize pt's level of independence in the home and community environment.     Pt's spiritual, cultural and educational needs considered and pt agreeable to plan of care and goals.    Anticipated barriers to physical therapy: chronicity of condition     Goals:     Short Term Goals:  6 weeks Status  Date Met   PAIN: Pt will report worst pain of 2/10 in order to progress toward max functional ability and improve quality of life. [] Progressing  [x] Met  [] Not Met  3/18/24   FUNCTION: Patient will demonstrate improved function as indicated by a functional score improvement of at least 5 points on FOTO. [] Progressing  [x] Met  [] Not Met  3/18/24   MOBILITY: Patient will improve AROM to 50% of stated goals, listed in objective measures above, in order to progress towards independence with functional activities.  [] Progressing  [x] Met  [] Not Met  3/18/24   STRENGTH: Patient will improve strength to 50% of stated goals, listed in objective measures above, in order to progress towards independence with functional activities. [x] Progressing  [x] Met  [] Not Met  3/18/24   POSTURE: Patient will correct postural deviations in sitting and standing, to decrease pain and promote long term stability.  [] Progressing  [x] Met  [] Not Met  3/18/24   HEP: Patient will demonstrate independence with HEP in order to progress toward functional independence. [] Progressing  [x] Met  [] Not Met  3/18/24      Long Term Goals:  12 weeks Status Date Met   PAIN: Pt will report worst pain of 1/10 in order to progress toward max functional ability and improve quality of life [x] Progressing  [] Met  [] Not Met     FUNCTION: Patient will demonstrate improved function as indicated by a FOTO functional score improvement as listed in header.  [x] Progressing  [] Met  [] Not Met     MOBILITY: Patient will improve AROM to stated goals, listed in objective measures above, in order to return to maximal functional potential and improve quality of life.  [x] Progressing  [] Met  [] Not Met     STRENGTH: Patient will improve strength to stated goals, listed in objective measures above, in order to improve functional independence and quality of life.  [x] Progressing  [] Met  [] Not Met     GAIT: Patient will demonstrate normalized gait mechanics with minimal compensation in order to return to PLOF. [x] Progressing  [] Met  [] Not Met     Patient will return to normal ADL's, IADL's, community involvement, recreational activities, and work-related activities with less than or equal to 1/10 pain and maximal function.  [x] Progressing  [] Met  [] Not Met          Plan   Plan of care Certification: 12/20/2023 to 3/20/24.     Continue with abductor loading progression  Continued to address movement deficits      Mo Lenz, PT, DPT, SCS

## 2024-03-22 RX ORDER — DICLOFENAC SODIUM 75 MG/1
75 TABLET, DELAYED RELEASE ORAL 2 TIMES DAILY WITH MEALS
Qty: 60 TABLET | Refills: 0 | Status: SHIPPED | OUTPATIENT
Start: 2024-03-22

## 2024-03-25 ENCOUNTER — CLINICAL SUPPORT (OUTPATIENT)
Facility: HOSPITAL | Age: 44
End: 2024-03-25
Payer: OTHER GOVERNMENT

## 2024-03-25 DIAGNOSIS — M25.551 RIGHT HIP PAIN: ICD-10-CM

## 2024-03-25 DIAGNOSIS — M17.11 PRIMARY OSTEOARTHRITIS OF RIGHT KNEE: ICD-10-CM

## 2024-03-25 DIAGNOSIS — M22.2X1 PATELLOFEMORAL PAIN SYNDROME OF RIGHT KNEE: Primary | ICD-10-CM

## 2024-03-25 PROCEDURE — 97140 MANUAL THERAPY 1/> REGIONS: CPT | Mod: PN | Performed by: PHYSICAL THERAPIST

## 2024-03-25 PROCEDURE — 97110 THERAPEUTIC EXERCISES: CPT | Mod: PN | Performed by: PHYSICAL THERAPIST

## 2024-03-25 NOTE — PROGRESS NOTES
[facility]   Physical Therapy Treatment Note + Plan of Care     Name: Sacha Fregoso  Clinic Number: [id number]    Therapy Diagnosis:   Encounter Diagnoses   Name Primary?    Patellofemoral pain syndrome of right knee Yes    Primary osteoarthritis of right knee     Right hip pain        Physician: John Ferguson MD     Physician Orders: PT Eval and Treat   Medical Diagnosis from Referral: Right hip pain [M25.551], Primary osteoarthritis of right knee [M17.11], Patellofemoral pain syndrome of right knee [M22.2X1]   Evaluation Date: 12/20/2023  Authorization Period Expiration: 4/12/24  Plan of Care Expiration: 6/10/24  Progress Note Due: 4/18/24  Visit # / Visits authorized: 5/14    Time In: 0803 am   Time Out: 0908 pm   Total Billable Time: 62 minutes (decreased due to lack of billable staff)  Billing reflects 1:1 direct supervision    MD follow-up:    Precautions: Standard    FOTO:  Goal: 72%  -Intake: 59%  -Status: 56%  -Discharge: incomplete    Subjective     Pt reports: He was sore over the weekend, no particular cause.  He was compliant with home exercise program.  Response to previous treatment: tolerated treatment well.   Functional change: improved ability to participate in high level ADL's in a limited fashion.     Pain: 7/10 at worst, 2/10 currently  Location: right knee     Objective      Objective Measures updated at progress report unless specified otherwise.    Primary Impairments: Dominant hamstring, knee valgus, knee lateral glide syndrome    - Slump test  Symmetrical rotation today    Running gait  L increased pronation during midstance compared to R  Minimal increase in R lateral trunk lean during mid stance  Appropriate foot/tibial inclination angle  Symmetrical CPD   Symmetrical knee flexion during midstance  Increased medial heel whip angle during swing on L  Forward head noted during midstance    Asymmetrical femoral/tibial torsion noted    Knee to  "wall  3" on R  5" on L    Hip abduction  L- 28#  R- 24#    Quad  L 180#  R 205#    Hamstring  L- 62.9  R- 53.3    Treatment     Sacha received the treatments listed below:      Intervention Performed  Today Code  (see below chart) Date/Notes  3/25   Updated goals, measurements, and FOTO for POC   TE     Manual x MT Knee extension mobs  Knee flexion mobs   Bike   TE 5'   Dynamic stretch X TE Squat  Lunge   Biodex testing x TE 25 minutes   Calf Stretch   TE Wedge: 30"x3   Prone quad stretch with strap   TE  30"x3   Hamstring Stretch   TE Seated: 30"x3   Supine Ext Stretch   TE 10# x 3'   SLR - Abd in side plank   TE 3x10 - lacy   Clamshell in side plank    TE 3x10 lacy    Lateral Squat Walk   NMR blue band, 2 laps    Leg press x TE 4 x 8 RPE 8   Knee extension x TE 4 x 8 RPE 8   Hamstring curl x TE 3 x 12 RPE 8                          08 minutes of Manual therapy (MT) to improve pain and ROM. (76927)  54 minutes of Therapeutic Exercise (TE) to develop strength, endurance, ROM, and flexibility. (09780)  00 minutes of Neuromuscular Re-Education (NR)  to improve: Balance, Coordination, Kinesthetic, Sense, Proprioception, and Posture. (49077)  00 minutes of Therapeutic Activities (TA) to improve functional performance. (20390)    Patient Education and Home Exercises         Education provided:   Hip abductor loading progression  Adding specific exercises like ankle mobs, single leg squats, lunges with rotation before running to prime the movements    Written Home Exercises Provided: Patient instructed to cont prior HEP.  Exercises were reviewed and Jackyregismisa was able to demonstrate them prior to the end of the session.  Sacha demonstrated good  understanding of the education provided.     See EMR under Patient Instructions for exercises provided prior visit.    Assessment     Sacha Fregoso tolerated PT session well with minimal  complaints of pain or discomfort. Biodex testing indicates continue thigh " muscle strength dysfunction.  Objective findings are improving with motor control and functional strength.  Updated home exercises were issued during today's visit. Sacha demonstrated good understanding of new exercises and will continue to progress at home until next follow-up.       Sacha Is progressing well towards his goals.   Pt prognosis is Good.     Pt will continue to benefit from skilled outpatient physical therapy to address the deficits listed in the problem list box on initial evaluation, provide pt/family education and to maximize pt's level of independence in the home and community environment.     Pt's spiritual, cultural and educational needs considered and pt agreeable to plan of care and goals.    Anticipated barriers to physical therapy: chronicity of condition     Goals:     Short Term Goals:  6 weeks Status  Date Met   PAIN: Pt will report worst pain of 2/10 in order to progress toward max functional ability and improve quality of life. [] Progressing  [x] Met  [] Not Met  3/18/24   FUNCTION: Patient will demonstrate improved function as indicated by a functional score improvement of at least 5 points on FOTO. [] Progressing  [x] Met  [] Not Met  3/18/24   MOBILITY: Patient will improve AROM to 50% of stated goals, listed in objective measures above, in order to progress towards independence with functional activities.  [] Progressing  [x] Met  [] Not Met  3/18/24   STRENGTH: Patient will improve strength to 50% of stated goals, listed in objective measures above, in order to progress towards independence with functional activities. [x] Progressing  [x] Met  [] Not Met  3/18/24   POSTURE: Patient will correct postural deviations in sitting and standing, to decrease pain and promote long term stability.  [] Progressing  [x] Met  [] Not Met  3/18/24   HEP: Patient will demonstrate independence with HEP in order to progress toward functional independence. [] Progressing  [x] Met  [] Not  Met  3/18/24      Long Term Goals:  12 weeks Status Date Met   PAIN: Pt will report worst pain of 1/10 in order to progress toward max functional ability and improve quality of life [x] Progressing  [] Met  [] Not Met     FUNCTION: Patient will demonstrate improved function as indicated by a FOTO functional score improvement as listed in header. [x] Progressing  [] Met  [] Not Met     MOBILITY: Patient will improve AROM to stated goals, listed in objective measures above, in order to return to maximal functional potential and improve quality of life.  [x] Progressing  [] Met  [] Not Met     STRENGTH: Patient will improve strength to stated goals, listed in objective measures above, in order to improve functional independence and quality of life.  [x] Progressing  [] Met  [] Not Met     GAIT: Patient will demonstrate normalized gait mechanics with minimal compensation in order to return to PLOF. [x] Progressing  [] Met  [] Not Met     Patient will return to normal ADL's, IADL's, community involvement, recreational activities, and work-related activities with less than or equal to 1/10 pain and maximal function.  [x] Progressing  [] Met  [] Not Met          Plan   Plan of care Certification: 12/20/2023 to 3/20/24.     Continue with abductor loading progression  Continued to address movement deficits      Jv Pedro, PT, DPT, SCS

## 2024-04-02 ENCOUNTER — CLINICAL SUPPORT (OUTPATIENT)
Facility: HOSPITAL | Age: 44
End: 2024-04-02
Payer: OTHER GOVERNMENT

## 2024-04-02 DIAGNOSIS — M25.551 RIGHT HIP PAIN: ICD-10-CM

## 2024-04-02 DIAGNOSIS — M17.11 PRIMARY OSTEOARTHRITIS OF RIGHT KNEE: ICD-10-CM

## 2024-04-02 DIAGNOSIS — M22.2X1 PATELLOFEMORAL PAIN SYNDROME OF RIGHT KNEE: Primary | ICD-10-CM

## 2024-04-02 PROCEDURE — 97112 NEUROMUSCULAR REEDUCATION: CPT | Mod: PN | Performed by: PHYSICAL THERAPIST

## 2024-04-02 PROCEDURE — 97140 MANUAL THERAPY 1/> REGIONS: CPT | Mod: PN | Performed by: PHYSICAL THERAPIST

## 2024-04-02 NOTE — PROGRESS NOTES
"  [facility]   Physical Therapy Treatment Note + Plan of Care     Name: Sacha Fregoso  Clinic Number: [id number]    Therapy Diagnosis:   Encounter Diagnoses   Name Primary?    Patellofemoral pain syndrome of right knee Yes    Primary osteoarthritis of right knee     Right hip pain          Physician: John Ferguson MD     Physician Orders: PT Eval and Treat   Medical Diagnosis from Referral: Right hip pain [M25.551], Primary osteoarthritis of right knee [M17.11], Patellofemoral pain syndrome of right knee [M22.2X1]   Evaluation Date: 12/20/2023  Authorization Period Expiration: 4/12/24  Plan of Care Expiration: 6/10/24  Progress Note Due: 4/18/24  Visit # / Visits authorized: 6/14    Time In: 0933 am   Time Out: 1035 pm   Total Billable Time: 24 minutes  Billing reflects 1:1 direct supervision    MD follow-up:    Precautions: Standard    FOTO:  Goal: 72%  -Intake: 59%  -Status: 56%  -Discharge: incomplete    Subjective     Pt reports: 4/2- He was fatigued after last visit. No significant symptoms todyay.  He was compliant with home exercise program.  Response to previous treatment: tolerated treatment well.   Functional change: improved ability to participate in high level ADL's in a limited fashion.     Pain: 7/10 at worst, 2/10 currently  Location: right knee     Objective      Objective Measures updated at progress report unless specified otherwise.    Primary Impairments: Dominant hamstring, knee valgus, knee lateral glide syndrome    - Slump test  Symmetrical rotation today    Running gait  L increased pronation during midstance compared to R  Minimal increase in R lateral trunk lean during mid stance  Appropriate foot/tibial inclination angle  Symmetrical CPD   Symmetrical knee flexion during midstance  Increased medial heel whip angle during swing on L  Forward head noted during midstance    Asymmetrical femoral/tibial torsion noted    Knee to wall  3" on R  5" on " "L    Hip abduction  L- 28#  R- 24#    Quad  L 180#  R 205#    Hamstring  L- 62.9  R- 53.3    Media Information        File Link    Scan on 3/25/2024  8:46 AM by Filemon Cardenas: Biodex testing        Key Information    Document ID File Type Document Type Description   [id number] Image Miscellaneous Documents clinic Biodex testing     Import Information    Attached At Date Time User Dept   Patient Level 3/25/2024  8:46 AM Filemon Cardenas [facility]       Treatment     Sacha received the treatments listed below:      Intervention Performed  Today Code  (see below chart) Date/Notes  4/2   Manual x MT Knee extension mobs  Knee flexion mobs   Bike  x* TE 5'   Dynamic stretch X* TE Squat  Lunge   Split squat         Prone quad stretch with strap   TE  30"x3   SLR - Abd in side plank x NR 3x10 - lacy   Clamshell in side plank    TE 3x10 lacy    Lateral Squat Walk   NMR blue band, 2 laps    Leg press x TE 4 x 5 RPE 8 (225#)   Knee extension x* TE 4 x 5 RPE 8 (140#)   Hamstring curl x* TE 3 x 12 RPE 8 (125#)   Sport cord lateral x NR 2 x 15 ea                10 minutes of Manual therapy (MT) to improve pain and ROM. (78339)  04 minutes of Therapeutic Exercise (TE) to develop strength, endurance, ROM, and flexibility. (39105)  10 minutes of Neuromuscular Re-Education (NR)  to improve: Balance, Coordination, Kinesthetic, Sense, Proprioception, and Posture. (08170)  00 minutes of Therapeutic Activities (TA) to improve functional performance. (49143)    Patient Education and Home Exercises         Education provided:   Hip abductor loading progression  Adding specific exercises like ankle mobs, single leg squats, lunges with rotation before running to prime the movements    Written Home Exercises Provided: Patient instructed to cont prior HEP.  Exercises were reviewed and Sacha was able to demonstrate them prior to the end of the session.  Sacha demonstrated good  understanding of the education " provided.     See EMR under Patient Instructions for exercises provided prior visit.    Assessment     Sacha Fregoso tolerated PT session well with minimal  complaints of pain or discomfort with deep flexion. Thsi was improved after manual. Patient continues to have impaired thigh and hip strength. Objective findings are improving with motor control, functional strength, and functional activity performance.  Updated home exercises were issued during today's visit. Sacha demonstrated good understanding of new exercises and will continue to progress at home until next follow-up.         Sacha Is progressing well towards his goals.   Pt prognosis is Good.     Pt will continue to benefit from skilled outpatient physical therapy to address the deficits listed in the problem list box on initial evaluation, provide pt/family education and to maximize pt's level of independence in the home and community environment.     Pt's spiritual, cultural and educational needs considered and pt agreeable to plan of care and goals.    Anticipated barriers to physical therapy: chronicity of condition     Goals:     Short Term Goals:  6 weeks Status  Date Met   PAIN: Pt will report worst pain of 2/10 in order to progress toward max functional ability and improve quality of life. [] Progressing  [x] Met  [] Not Met  3/18/24   FUNCTION: Patient will demonstrate improved function as indicated by a functional score improvement of at least 5 points on FOTO. [] Progressing  [x] Met  [] Not Met  3/18/24   MOBILITY: Patient will improve AROM to 50% of stated goals, listed in objective measures above, in order to progress towards independence with functional activities.  [] Progressing  [x] Met  [] Not Met  3/18/24   STRENGTH: Patient will improve strength to 50% of stated goals, listed in objective measures above, in order to progress towards independence with functional activities. [x] Progressing  [x] Met  [] Not Met  3/18/24    POSTURE: Patient will correct postural deviations in sitting and standing, to decrease pain and promote long term stability.  [] Progressing  [x] Met  [] Not Met  3/18/24   HEP: Patient will demonstrate independence with HEP in order to progress toward functional independence. [] Progressing  [x] Met  [] Not Met  3/18/24      Long Term Goals:  12 weeks Status Date Met   PAIN: Pt will report worst pain of 1/10 in order to progress toward max functional ability and improve quality of life [x] Progressing  [] Met  [] Not Met     FUNCTION: Patient will demonstrate improved function as indicated by a FOTO functional score improvement as listed in header. [x] Progressing  [] Met  [] Not Met     MOBILITY: Patient will improve AROM to stated goals, listed in objective measures above, in order to return to maximal functional potential and improve quality of life.  [x] Progressing  [] Met  [] Not Met     STRENGTH: Patient will improve strength to stated goals, listed in objective measures above, in order to improve functional independence and quality of life.  [x] Progressing  [] Met  [] Not Met     GAIT: Patient will demonstrate normalized gait mechanics with minimal compensation in order to return to PLOF. [x] Progressing  [] Met  [] Not Met     Patient will return to normal ADL's, IADL's, community involvement, recreational activities, and work-related activities with less than or equal to 1/10 pain and maximal function.  [x] Progressing  [] Met  [] Not Met          Plan   Plan of care Certification: 12/20/2023 to 3/20/24.     Continue with abductor loading progression  Continued to address movement deficits      Jv Pedro, PT, DPT, SCS

## 2024-04-08 ENCOUNTER — CLINICAL SUPPORT (OUTPATIENT)
Facility: HOSPITAL | Age: 44
End: 2024-04-08
Payer: OTHER GOVERNMENT

## 2024-04-08 DIAGNOSIS — M17.11 PRIMARY OSTEOARTHRITIS OF RIGHT KNEE: ICD-10-CM

## 2024-04-08 DIAGNOSIS — M25.551 RIGHT HIP PAIN: ICD-10-CM

## 2024-04-08 DIAGNOSIS — M22.2X1 PATELLOFEMORAL PAIN SYNDROME OF RIGHT KNEE: Primary | ICD-10-CM

## 2024-04-08 PROCEDURE — 97140 MANUAL THERAPY 1/> REGIONS: CPT | Mod: PN | Performed by: PHYSICAL THERAPIST

## 2024-04-08 PROCEDURE — 97112 NEUROMUSCULAR REEDUCATION: CPT | Mod: PN | Performed by: PHYSICAL THERAPIST

## 2024-04-08 PROCEDURE — 97110 THERAPEUTIC EXERCISES: CPT | Mod: PN | Performed by: PHYSICAL THERAPIST

## 2024-04-08 NOTE — PROGRESS NOTES
[facility]   Physical Therapy Treatment Note + Plan of Care     Name: Sacha Fregoso  Clinic Number: [id number]    Therapy Diagnosis:   Encounter Diagnoses   Name Primary?    Patellofemoral pain syndrome of right knee Yes    Primary osteoarthritis of right knee     Right hip pain        Physician: John Ferguson MD     Physician Orders: PT Eval and Treat   Medical Diagnosis from Referral: Right hip pain [M25.551], Primary osteoarthritis of right knee [M17.11], Patellofemoral pain syndrome of right knee [M22.2X1]   Evaluation Date: 12/20/2023  Authorization Period Expiration: 4/12/24  Plan of Care Expiration: 6/10/24  Progress Note Due: 4/18/24  Visit # / Visits authorized: 7/14    Time In: 0803 am   Time Out: 0910 am   Total Billable Time: 54 minutes  Billing reflects 1:1 direct supervision    MD follow-up:    Precautions: Standard    FOTO:  Goal: 72%  -Intake: 59%  -Status: 56%  -Discharge: incomplete    Subjective     Pt reports: 4/8- The knee has been feeling about normal, he is tolerating progressive loading.  He was compliant with home exercise program.  Response to previous treatment: tolerated treatment well.   Functional change: improved ability to participate in high level ADL's in a limited fashion.     Pain: 7/10 at worst, 2/10 currently  Location: right knee     Objective      Objective Measures updated at progress report unless specified otherwise.    Primary Impairments:   Thigh strength  Dominant hamstring,   knee valgus,   knee lateral glide syndrome    Shoulder  B flat clavicle  L scapular downward rotation  L resting shoulder anterior glide  Mild limited L ER, scaption strength    Treatment     Sacha received the treatments listed below:      Intervention Performed  Today Code  (see below chart) Date/Notes  4/8   Manual x MT Knee extension mobs  Knee flexion mobs  Thoracic manip  L GH posterior mobs   Shoudler assessment x 15 minutes x TE    Bike   TE 5'  "  Dynamic stretch X TE Squat  Lunge   Supine subscap retraining x NR 15x   Serratus wall slide with overhead reach x NR 15x   Split squat x TA 35# x 10, 45# 3 x 8   Prone quad stretch with strap   TE  30"x3   SLR - Abd in side plank   NR 3x10 - lacy   Clamshell in side plank    TE 3x10 lacy    Lateral Squat Walk x NMR blue band, 2 laps    Leg press   TE 4 x 5 RPE 8 (225#)   Knee extension X* TE 4 x 5 RPE 8 (140#)   Hamstring curl X* TE 3 x 12 RPE 8 (125#)   Sport cord lateral   NR 2 x 15 ea                10 minutes of Manual therapy (MT) to improve pain and ROM. (92646)  24 minutes of Therapeutic Exercise (TE) to develop strength, endurance, ROM, and flexibility. (70373)  15 minutes of Neuromuscular Re-Education (NR)  to improve: Balance, Coordination, Kinesthetic, Sense, Proprioception, and Posture. (48945)  5 minutes of Therapeutic Activities (TA) to improve functional performance. (09822)    Patient Education and Home Exercises         Education provided:   Hip abductor loading progression  Adding specific exercises like ankle mobs, single leg squats, lunges with rotation before running to prime the movements    Written Home Exercises Provided: Patient instructed to cont prior HEP.  Exercises were reviewed and Sacha was able to demonstrate them prior to the end of the session.  Sacha demonstrated good  understanding of the education provided.     See EMR under Patient Instructions for exercises provided prior visit.    Assessment     4/8- Sacha Naif Fregoso tolerated PT session well with minimal  complaints of pain or discomfort. Shoulder pain is consistent with GH anterior glide syndrome with impaired scapular mechanics as listed above. Mildly improved symptoms with manual and cuing. Patient continues to have impaired thigh and hip strength. Objective findings are improving with functional strength, functional activity performance, and symptoms.  Updated home exercises were issued during today's " visit. Sacha demonstrated good understanding of new exercises and will continue to progress at home until next follow-up.         Sacha Is progressing well towards his goals.   Pt prognosis is Good.     Pt will continue to benefit from skilled outpatient physical therapy to address the deficits listed in the problem list box on initial evaluation, provide pt/family education and to maximize pt's level of independence in the home and community environment.     Pt's spiritual, cultural and educational needs considered and pt agreeable to plan of care and goals.    Anticipated barriers to physical therapy: chronicity of condition     Goals:     Short Term Goals:  6 weeks Status  Date Met   PAIN: Pt will report worst pain of 2/10 in order to progress toward max functional ability and improve quality of life. [] Progressing  [x] Met  [] Not Met  3/18/24   FUNCTION: Patient will demonstrate improved function as indicated by a functional score improvement of at least 5 points on FOTO. [] Progressing  [x] Met  [] Not Met  3/18/24   MOBILITY: Patient will improve AROM to 50% of stated goals, listed in objective measures above, in order to progress towards independence with functional activities.  [] Progressing  [x] Met  [] Not Met  3/18/24   STRENGTH: Patient will improve strength to 50% of stated goals, listed in objective measures above, in order to progress towards independence with functional activities. [x] Progressing  [x] Met  [] Not Met  3/18/24   POSTURE: Patient will correct postural deviations in sitting and standing, to decrease pain and promote long term stability.  [] Progressing  [x] Met  [] Not Met  3/18/24   HEP: Patient will demonstrate independence with HEP in order to progress toward functional independence. [] Progressing  [x] Met  [] Not Met  3/18/24      Long Term Goals:  12 weeks Status Date Met   PAIN: Pt will report worst pain of 1/10 in order to progress toward max functional ability and  improve quality of life [x] Progressing  [] Met  [] Not Met     FUNCTION: Patient will demonstrate improved function as indicated by a FOTO functional score improvement as listed in header. [x] Progressing  [] Met  [] Not Met     MOBILITY: Patient will improve AROM to stated goals, listed in objective measures above, in order to return to maximal functional potential and improve quality of life.  [x] Progressing  [] Met  [] Not Met     STRENGTH: Patient will improve strength to stated goals, listed in objective measures above, in order to improve functional independence and quality of life.  [x] Progressing  [] Met  [] Not Met     GAIT: Patient will demonstrate normalized gait mechanics with minimal compensation in order to return to PLOF. [x] Progressing  [] Met  [] Not Met     Patient will return to normal ADL's, IADL's, community involvement, recreational activities, and work-related activities with less than or equal to 1/10 pain and maximal function.  [x] Progressing  [] Met  [] Not Met          Plan   Plan of care Certification: 12/20/2023 to 3/20/24.     Continue with abductor loading progression  Continued to address movement deficits      Jv Pedro, PT, DPT, SCS

## 2024-04-15 ENCOUNTER — CLINICAL SUPPORT (OUTPATIENT)
Facility: HOSPITAL | Age: 44
End: 2024-04-15
Payer: OTHER GOVERNMENT

## 2024-04-15 DIAGNOSIS — M22.2X1 PATELLOFEMORAL PAIN SYNDROME OF RIGHT KNEE: Primary | ICD-10-CM

## 2024-04-15 DIAGNOSIS — M25.551 RIGHT HIP PAIN: ICD-10-CM

## 2024-04-15 DIAGNOSIS — M17.11 PRIMARY OSTEOARTHRITIS OF RIGHT KNEE: ICD-10-CM

## 2024-04-15 PROCEDURE — 97110 THERAPEUTIC EXERCISES: CPT | Mod: PN | Performed by: PHYSICAL THERAPIST

## 2024-04-15 PROCEDURE — 97140 MANUAL THERAPY 1/> REGIONS: CPT | Mod: PN | Performed by: PHYSICAL THERAPIST

## 2024-04-15 NOTE — PROGRESS NOTES
[facility]   Physical Therapy Treatment Note + Plan of Care     Name: Sacha Fregoso  Clinic Number: [id number]    Therapy Diagnosis:   Encounter Diagnoses   Name Primary?    Patellofemoral pain syndrome of right knee Yes    Primary osteoarthritis of right knee     Right hip pain        Physician: No ref. provider found     Physician Orders: PT Eval and Treat   Medical Diagnosis from Referral: Right hip pain [M25.551], Primary osteoarthritis of right knee [M17.11], Patellofemoral pain syndrome of right knee [M22.2X1]   Evaluation Date: 12/20/2023  Authorization Period Expiration: 4/12/24  Plan of Care Expiration: 6/10/24  Progress Note Due: 4/18/24  Visit # / Visits authorized: 8/14    Time In: 0803 am   Time Out: 0910 am   Total Billable Time: 24 minutes  Billing reflects 1:1 direct supervision    MD follow-up:    Precautions: Standard    FOTO:  Goal: 72%  -Intake: 59%  -Status: 56%  -Discharge: incomplete    Subjective     Pt reports: 4/15- He notices some stiffness today from installing a  over the weekend.  He was compliant with home exercise program.  Response to previous treatment: tolerated treatment well.   Functional change: improved ability to participate in high level ADL's in a limited fashion.     Pain: 7/10 at worst, 2/10 currently  Location: right knee     Objective      Objective Measures updated at progress report unless specified otherwise.    Primary Impairments:   Thigh strength  Dominant hamstring,   knee valgus,   knee lateral glide syndrome    Shoulder  B flat clavicle  L scapular downward rotation  L resting shoulder anterior glide  Mild limited L ER, scaption strength    Treatment     Sacha received the treatments listed below:      Intervention Performed  Today Code  (see below chart) Date/Notes  4/15   Manual x MT Knee extension mobs  Knee flexion mobs     Bike  x* TE 5'   Dynamic stretch X* TE Squat  Lunge   Supine subscap retraining   NR  "15x   Serratus wall slide with overhead reach   NR 15x   Split squat X TA 35# x 10, 45# 3 x 8   Prone quad stretch with strap   TE  30"x3   SLR - Abd in side plank X* NR 3x10 - lacy   Clamshell in side plank    TE 3x10 lacy    Lateral Squat Walk X* NMR blue band, 3 laps    Leg press   TE 4 x 5 RPE 8 (225#)   Knee extension X TE warm up: 95#  4 x 5 RPE 8 (140#)   Hamstring curl X* TE 3 x 12 RPE 8 (125#)   Sport cord lateral X* NR 2 x 15 ea   Sled pushes & pulls X TE 3 laps      10 minutes of Manual therapy (MT) to improve pain and ROM. (69295)  10 minutes of Therapeutic Exercise (TE) to develop strength, endurance, ROM, and flexibility. (39003)  04 minutes of Neuromuscular Re-Education (NR)  to improve: Balance, Coordination, Kinesthetic, Sense, Proprioception, and Posture. ([id number])  00 minutes of Therapeutic Activities (TA) to improve functional performance. (62695)      * indicates that exercise was performed, not billed today since patient was not under direct one-on-one supervision      Patient Education and Home Exercises         Education provided:   Hip abductor loading progression  Adding specific exercises like ankle mobs, single leg squats, lunges with rotation before running to prime the movements    Written Home Exercises Provided: Patient instructed to cont prior HEP.  Exercises were reviewed and Sacha was able to demonstrate them prior to the end of the session.  Sacha demonstrated good  understanding of the education provided.     See EMR under Patient Instructions for exercises provided prior visit.    Assessment     4/15- Sacha Naif Fregoso tolerated PT session well with minimal  complaints of pain or discomfort. Patient continues to have impaired thigh muscle strength and hip stability. Objective findings are improving with joint mobility, functional strength, and symptoms.  Updated home exercises were not issued during today's visit. Sacha demonstrated good understanding of new " exercises and will continue to progress at home until next follow-up.           Sacha Is progressing well towards his goals.   Pt prognosis is Good.     Pt will continue to benefit from skilled outpatient physical therapy to address the deficits listed in the problem list box on initial evaluation, provide pt/family education and to maximize pt's level of independence in the home and community environment.     Pt's spiritual, cultural and educational needs considered and pt agreeable to plan of care and goals.    Anticipated barriers to physical therapy: chronicity of condition     Goals:     Short Term Goals:  6 weeks Status  Date Met   PAIN: Pt will report worst pain of 2/10 in order to progress toward max functional ability and improve quality of life. [] Progressing  [x] Met  [] Not Met  3/18/24   FUNCTION: Patient will demonstrate improved function as indicated by a functional score improvement of at least 5 points on FOTO. [] Progressing  [x] Met  [] Not Met  3/18/24   MOBILITY: Patient will improve AROM to 50% of stated goals, listed in objective measures above, in order to progress towards independence with functional activities.  [] Progressing  [x] Met  [] Not Met  3/18/24   STRENGTH: Patient will improve strength to 50% of stated goals, listed in objective measures above, in order to progress towards independence with functional activities. [x] Progressing  [x] Met  [] Not Met  3/18/24   POSTURE: Patient will correct postural deviations in sitting and standing, to decrease pain and promote long term stability.  [] Progressing  [x] Met  [] Not Met  3/18/24   HEP: Patient will demonstrate independence with HEP in order to progress toward functional independence. [] Progressing  [x] Met  [] Not Met  3/18/24      Long Term Goals:  12 weeks Status Date Met   PAIN: Pt will report worst pain of 1/10 in order to progress toward max functional ability and improve quality of life [x] Progressing  [] Met  []  Not Met     FUNCTION: Patient will demonstrate improved function as indicated by a FOTO functional score improvement as listed in header. [x] Progressing  [] Met  [] Not Met     MOBILITY: Patient will improve AROM to stated goals, listed in objective measures above, in order to return to maximal functional potential and improve quality of life.  [x] Progressing  [] Met  [] Not Met     STRENGTH: Patient will improve strength to stated goals, listed in objective measures above, in order to improve functional independence and quality of life.  [x] Progressing  [] Met  [] Not Met     GAIT: Patient will demonstrate normalized gait mechanics with minimal compensation in order to return to PLOF. [x] Progressing  [] Met  [] Not Met     Patient will return to normal ADL's, IADL's, community involvement, recreational activities, and work-related activities with less than or equal to 1/10 pain and maximal function.  [x] Progressing  [] Met  [] Not Met          Plan   Plan of care Certification: 12/20/2023 to 3/20/24.     Continue with abductor loading progression  Continued to address movement deficits      Jv Pedro, PT,

## 2024-04-19 ENCOUNTER — PATIENT MESSAGE (OUTPATIENT)
Dept: RESEARCH | Facility: HOSPITAL | Age: 44
End: 2024-04-19
Payer: OTHER GOVERNMENT

## 2024-04-22 ENCOUNTER — CLINICAL SUPPORT (OUTPATIENT)
Facility: HOSPITAL | Age: 44
End: 2024-04-22
Payer: OTHER GOVERNMENT

## 2024-04-22 DIAGNOSIS — M17.11 PRIMARY OSTEOARTHRITIS OF RIGHT KNEE: ICD-10-CM

## 2024-04-22 DIAGNOSIS — M22.2X1 PATELLOFEMORAL PAIN SYNDROME OF RIGHT KNEE: Primary | ICD-10-CM

## 2024-04-22 DIAGNOSIS — M25.551 RIGHT HIP PAIN: ICD-10-CM

## 2024-04-22 PROCEDURE — 97140 MANUAL THERAPY 1/> REGIONS: CPT | Mod: PN | Performed by: PHYSICAL THERAPIST

## 2024-04-24 NOTE — PROGRESS NOTES
[facility]   Physical Therapy Treatment Note + Plan of Care     Name: Sacha Fregoso  Clinic Number: [id number]    Therapy Diagnosis:   Encounter Diagnoses   Name Primary?    Patellofemoral pain syndrome of right knee Yes    Primary osteoarthritis of right knee     Right hip pain        Physician: John Ferguson MD     Physician Orders: PT Eval and Treat   Medical Diagnosis from Referral: Right hip pain [M25.551], Primary osteoarthritis of right knee [M17.11], Patellofemoral pain syndrome of right knee [M22.2X1]   Evaluation Date: 12/20/2023  Authorization Period Expiration: 4/12/24  Plan of Care Expiration: 6/10/24  Progress Note Due: 4/18/24  Visit # / Visits authorized: 9/14    Time In: 1105 am   Time Out: 1204 pm   Total Billable Time: 12 minutes  Billing reflects 1:1 direct supervision    MD follow-up:    Precautions: Standard    FOTO:  Goal: 72%  -Intake: 59%  -Status: 56%  -Discharge: incomplete    Subjective     Pt reports: 4/22- Minor stiffness from cutting the grass over the weekend. He is going out of town for around 3 weeks.  He was compliant with home exercise program.  Response to previous treatment: tolerated treatment well.   Functional change: improved ability to participate in high level ADL's in a limited fashion.     Pain: 7/10 at worst, 2/10 currently  Location: right knee     Objective      Objective Measures updated at progress report unless specified otherwise.    Primary Impairments:   Thigh strength  Dominant hamstring,   knee valgus,   knee lateral glide syndrome    Shoulder  B flat clavicle  L scapular downward rotation  L resting shoulder anterior glide  Mild limited L ER, scaption strength    Treatment     Sacha received the treatments listed below:      Intervention Performed  Today Code  (see below chart) Date/Notes  4/22   Manual x MT Knee extension mobs  Knee flexion mobs   Bike   TE 5'   Dynamic stretch X* TE Squat  Lunge   Supine  "subscap retraining   NR 15x   Serratus wall slide with overhead reach   NR 15x   Decline step down anterior X* TA 25# KB holds 6" 3 x 12   Prone quad stretch with strap   TE 30"x3   Side plank holds X* NR 3 x 30s - lacy   Clamshell in side plank  X* TE 3x10 lacy    Lateral Squat Walk X* NMR blue band, 3 laps    Leg press X* TE 4 x 5 RPE 8 (225#)   Knee extension X* TE warm up: 95#  4 x 5 RPE 8 (140#)   Hamstring curl X* TE 3 x 12 RPE 8 (125#)   Sport cord lateral  NR 2 x 15 ea   Sled pushes & pulls X* TE 3 laps      12 minutes of Manual therapy (MT) to improve pain and ROM. (90706)  00 minutes of Therapeutic Exercise (TE) to develop strength, endurance, ROM, and flexibility. (87405)  00 minutes of Neuromuscular Re-Education (NR)  to improve: Balance, Coordination, Kinesthetic, Sense, Proprioception, and Posture. (46051)  00 minutes of Therapeutic Activities (TA) to improve functional performance. (82442)    * indicates that exercise was performed, not billed today since patient was not under direct one-on-one supervision      Patient Education and Home Exercises         Education provided:   Hip abductor loading progression  Adding specific exercises like ankle mobs, single leg squats, lunges with rotation before running to prime the movements    Written Home Exercises Provided: Patient instructed to cont prior HEP.  Exercises were reviewed and Sacha was able to demonstrate them prior to the end of the session.  Sacha demonstrated good  understanding of the education provided.     See EMR under Patient Instructions for exercises provided prior visit.    Assessment     4/22- Sacha Vanwin tolerated PT session well with no  complaints of pain or discomfort. Patient continues to have intermittent symptoms and thigh muscle weakness. Objective findings are improving with functional strength and functional activity performance.  Updated home exercises were issued during today's visit. Sacha" demonstrated good understanding of new exercises and will continue to progress at home until next follow-up.             Sacha Is progressing well towards his goals.   Pt prognosis is Good.     Pt will continue to benefit from skilled outpatient physical therapy to address the deficits listed in the problem list box on initial evaluation, provide pt/family education and to maximize pt's level of independence in the home and community environment.     Pt's spiritual, cultural and educational needs considered and pt agreeable to plan of care and goals.    Anticipated barriers to physical therapy: chronicity of condition     Goals:     Short Term Goals:  6 weeks Status  Date Met   PAIN: Pt will report worst pain of 2/10 in order to progress toward max functional ability and improve quality of life. [] Progressing  [x] Met  [] Not Met  3/18/24   FUNCTION: Patient will demonstrate improved function as indicated by a functional score improvement of at least 5 points on FOTO. [] Progressing  [x] Met  [] Not Met  3/18/24   MOBILITY: Patient will improve AROM to 50% of stated goals, listed in objective measures above, in order to progress towards independence with functional activities.  [] Progressing  [x] Met  [] Not Met  3/18/24   STRENGTH: Patient will improve strength to 50% of stated goals, listed in objective measures above, in order to progress towards independence with functional activities. [x] Progressing  [x] Met  [] Not Met  3/18/24   POSTURE: Patient will correct postural deviations in sitting and standing, to decrease pain and promote long term stability.  [] Progressing  [x] Met  [] Not Met  3/18/24   HEP: Patient will demonstrate independence with HEP in order to progress toward functional independence. [] Progressing  [x] Met  [] Not Met  3/18/24      Long Term Goals:  12 weeks Status Date Met   PAIN: Pt will report worst pain of 1/10 in order to progress toward max functional ability and improve  quality of life [x] Progressing  [] Met  [] Not Met     FUNCTION: Patient will demonstrate improved function as indicated by a FOTO functional score improvement as listed in header. [x] Progressing  [] Met  [] Not Met     MOBILITY: Patient will improve AROM to stated goals, listed in objective measures above, in order to return to maximal functional potential and improve quality of life.  [x] Progressing  [] Met  [] Not Met     STRENGTH: Patient will improve strength to stated goals, listed in objective measures above, in order to improve functional independence and quality of life.  [x] Progressing  [] Met  [] Not Met     GAIT: Patient will demonstrate normalized gait mechanics with minimal compensation in order to return to PLOF. [x] Progressing  [] Met  [] Not Met     Patient will return to normal ADL's, IADL's, community involvement, recreational activities, and work-related activities with less than or equal to 1/10 pain and maximal function.  [x] Progressing  [] Met  [] Not Met          Plan   Plan of care Certification: 12/20/2023 to 3/20/24.     Biodex test next visit  Continue with abductor loading progression  Continued to address movement deficits      Jv Pedro, PT,

## 2024-05-06 ENCOUNTER — PATIENT MESSAGE (OUTPATIENT)
Dept: PRIMARY CARE CLINIC | Facility: CLINIC | Age: 44
End: 2024-05-06
Payer: OTHER GOVERNMENT

## 2024-05-06 DIAGNOSIS — F43.10 PTSD (POST-TRAUMATIC STRESS DISORDER): Primary | ICD-10-CM

## 2024-05-07 NOTE — TELEPHONE ENCOUNTER
See pended referral (if applicable).//ddw    -Referral can be found under [facility] tab.  (Unsure if required fills are correct please review)

## 2024-05-08 ENCOUNTER — PATIENT MESSAGE (OUTPATIENT)
Dept: RESEARCH | Facility: HOSPITAL | Age: 44
End: 2024-05-08
Payer: OTHER GOVERNMENT

## 2024-05-13 ENCOUNTER — CLINICAL SUPPORT (OUTPATIENT)
Facility: HOSPITAL | Age: 44
End: 2024-05-13
Payer: OTHER GOVERNMENT

## 2024-05-13 DIAGNOSIS — M22.2X1 PATELLOFEMORAL PAIN SYNDROME OF RIGHT KNEE: Primary | ICD-10-CM

## 2024-05-13 DIAGNOSIS — M25.551 RIGHT HIP PAIN: ICD-10-CM

## 2024-05-13 DIAGNOSIS — M17.11 PRIMARY OSTEOARTHRITIS OF RIGHT KNEE: ICD-10-CM

## 2024-05-13 PROCEDURE — 97110 THERAPEUTIC EXERCISES: CPT | Mod: PN | Performed by: PHYSICAL THERAPIST

## 2024-05-13 NOTE — PROGRESS NOTES
[facility]   Physical Therapy Treatment Note + Plan of Care     Name: Sacha Fregoso  Clinic Number: [id number]    Therapy Diagnosis:   No diagnosis found.      Physician: No ref. provider found     Physician Orders: PT Eval and Treat   Medical Diagnosis from Referral: Right hip pain [M25.551], Primary osteoarthritis of right knee [M17.11], Patellofemoral pain syndrome of right knee [M22.2X1]   Evaluation Date: 12/20/2023  Authorization Period Expiration: 4/12/24  Plan of Care Expiration: 6/10/24  Progress Note Due: 6/13/24  Visit # / Visits authorized: 10/14    Time In: 0801 am   Time Out: 0902 am   Total Billable Time: 45 minutes  Billing reflects 1:1 direct supervision    MD follow-up:    Precautions: Standard    FOTO:  Goal: 72%  -Intake: 59%  -Status: 56%  -Discharge: incomplete    Subjective     Pt reports: 5/13- The knee has been feeling a little bit achy this weekend from getting back into normal activities.  He was compliant with home exercise program.  Response to previous treatment: tolerated treatment well.   Functional change: improved ability to participate in high level ADL's in a limited fashion.     Pain: 7/10 at worst, 2/10 currently  Location: right knee     Objective      Objective Measures updated at progress report unless specified otherwise.    Primary Impairments:   Thigh strength  Dominant hamstring,   knee valgus,   knee lateral glide syndrome      Hip abductor strength  R- 4/5  L- 4/5    Hip extensor strength  R- 4/5  L- 4/5       Single-leg step down assessment: (0-1 good quality of movement, 2-3 moderate, 4+ poor)         LLE RLE   1. Hands off hips   []  []    2. Trunk shift    []  []    3. Pelvic drop    []  []    4. Knee medial to 2nd toe  [x]  [x]    5. Lift off of medial foot  []  []    6. Loss of balance   [x]  []            2/6 1/6       Media Information        File Link    Scan on 5/13/2024  9:15 AM by Fabian Lawson: Biodex test 5/13/24        Key  Information    Document ID File Type Document Type Description   [id number] Image Miscellaneous Documents clinic Biodex test 5/13/24     Import Information    Attached At Date Time User Dept   Patient Level 5/13/2024  9:15 AM Fabian Lawson [facility]       Treatment     Sacha received the treatments listed below:      Sacha received therapeutic exercises to develop strength and ROM for 45 minutes including:    Bike x 5 minutes  Dynamic stretch x 5 minutes  Knee extension 1 x 15 moderate, 1 x 5 heavy  Hamstring curl 1 x 15 moderate, 1 x 5 heavy  Biodex testing      * indicates that exercise was performed, not billed today since patient was not under direct one-on-one supervision      Patient Education and Home Exercises         Education provided:   Hip abductor loading progression  Adding specific exercises like ankle mobs, single leg squats, lunges with rotation before running to prime the movements    Written Home Exercises Provided: Patient instructed to cont prior HEP.  Exercises were reviewed and Sacha was able to demonstrate them prior to the end of the session.  Sacah demonstrated good  understanding of the education provided.     See EMR under Patient Instructions for exercises provided prior visit.    Assessment     5/13- Strength testing today showed significant improvement in quad strength symmetry, but continued deficit in hamstring strength.        Sacha Is progressing well towards his goals.   Pt prognosis is Good.     Pt will continue to benefit from skilled outpatient physical therapy to address the deficits listed in the problem list box on initial evaluation, provide pt/family education and to maximize pt's level of independence in the home and community environment.     Pt's spiritual, cultural and educational needs considered and pt agreeable to plan of care and goals.    Anticipated barriers to physical therapy: chronicity of condition     Goals:     Short  Term Goals:  6 weeks Status  Date Met   PAIN: Pt will report worst pain of 2/10 in order to progress toward max functional ability and improve quality of life. [] Progressing  [x] Met  [] Not Met  3/18/24   FUNCTION: Patient will demonstrate improved function as indicated by a functional score improvement of at least 5 points on FOTO. [] Progressing  [x] Met  [] Not Met  3/18/24   MOBILITY: Patient will improve AROM to 50% of stated goals, listed in objective measures above, in order to progress towards independence with functional activities.  [] Progressing  [x] Met  [] Not Met  3/18/24   STRENGTH: Patient will improve strength to 50% of stated goals, listed in objective measures above, in order to progress towards independence with functional activities. [x] Progressing  [x] Met  [] Not Met  3/18/24   POSTURE: Patient will correct postural deviations in sitting and standing, to decrease pain and promote long term stability.  [] Progressing  [x] Met  [] Not Met  3/18/24   HEP: Patient will demonstrate independence with HEP in order to progress toward functional independence. [] Progressing  [x] Met  [] Not Met  3/18/24      Long Term Goals:  12 weeks Status Date Met   PAIN: Pt will report worst pain of 1/10 in order to progress toward max functional ability and improve quality of life [x] Progressing  [] Met  [] Not Met     FUNCTION: Patient will demonstrate improved function as indicated by a FOTO functional score improvement as listed in header. [x] Progressing  [] Met  [] Not Met     MOBILITY: Patient will improve AROM to stated goals, listed in objective measures above, in order to return to maximal functional potential and improve quality of life.  [x] Progressing  [x] Met  [] Not Met     STRENGTH: Patient will improve strength to stated goals, listed in objective measures above, in order to improve functional independence and quality of life.  [x] Progressing  [] Met  [] Not Met     GAIT: Patient will  demonstrate normalized gait mechanics with minimal compensation in order to return to PLOF. [x] Progressing  [x] Met  [] Not Met     Patient will return to normal ADL's, IADL's, community involvement, recreational activities, and work-related activities with less than or equal to 1/10 pain and maximal function.  [x] Progressing  [] Met  [] Not Met          Plan   Plan of care Certification: 12/20/2023 to 3/20/24.     Biodex test next visit  Continue with abductor loading progression  Continued to address movement deficits      Jv Pedro, PT,

## 2024-05-20 ENCOUNTER — CLINICAL SUPPORT (OUTPATIENT)
Facility: HOSPITAL | Age: 44
End: 2024-05-20
Payer: OTHER GOVERNMENT

## 2024-05-20 ENCOUNTER — OFFICE VISIT (OUTPATIENT)
Dept: PULMONOLOGY | Facility: CLINIC | Age: 44
End: 2024-05-20
Payer: OTHER GOVERNMENT

## 2024-05-20 VITALS
HEIGHT: 71 IN | WEIGHT: 178.81 LBS | OXYGEN SATURATION: 98 % | DIASTOLIC BLOOD PRESSURE: 70 MMHG | SYSTOLIC BLOOD PRESSURE: 108 MMHG | BODY MASS INDEX: 25.03 KG/M2 | HEART RATE: 75 BPM | RESPIRATION RATE: 20 BRPM

## 2024-05-20 DIAGNOSIS — M25.551 RIGHT HIP PAIN: ICD-10-CM

## 2024-05-20 DIAGNOSIS — M17.11 PRIMARY OSTEOARTHRITIS OF RIGHT KNEE: ICD-10-CM

## 2024-05-20 DIAGNOSIS — M22.2X1 PATELLOFEMORAL PAIN SYNDROME OF RIGHT KNEE: Primary | ICD-10-CM

## 2024-05-20 DIAGNOSIS — G25.81 RESTLESS LEGS: ICD-10-CM

## 2024-05-20 DIAGNOSIS — R35.1 NOCTURIA: ICD-10-CM

## 2024-05-20 DIAGNOSIS — F51.04 PSYCHOPHYSIOLOGICAL INSOMNIA: ICD-10-CM

## 2024-05-20 DIAGNOSIS — G47.30 SLEEP DISORDER BREATHING: Primary | ICD-10-CM

## 2024-05-20 PROCEDURE — 99214 OFFICE O/P EST MOD 30 MIN: CPT | Mod: PBBFAC | Performed by: NURSE PRACTITIONER

## 2024-05-20 PROCEDURE — 97112 NEUROMUSCULAR REEDUCATION: CPT | Mod: PN | Performed by: PHYSICAL THERAPIST

## 2024-05-20 PROCEDURE — 97140 MANUAL THERAPY 1/> REGIONS: CPT | Mod: PN | Performed by: PHYSICAL THERAPIST

## 2024-05-20 PROCEDURE — 99999 PR PBB SHADOW E&M-EST. PATIENT-LVL IV: CPT | Mod: PBBFAC,,, | Performed by: NURSE PRACTITIONER

## 2024-05-20 PROCEDURE — 99204 OFFICE O/P NEW MOD 45 MIN: CPT | Mod: S$PBB,,, | Performed by: NURSE PRACTITIONER

## 2024-05-20 NOTE — PROGRESS NOTES
Subjective:      Patient ID: Sacha Fregoso is a 43 y.o. male.    Chief Complaint: Sleep Apnea    HPI    Patient presents today for evaluation of sleep.  Patient with snoring. Patient not having problems falling asleep, but wakes up frequently throughout the night. Difficulty falling back asleep.   Patient does not wake up feeling refreshed in the morning.  Patient with daytime hypersomnolence. Both of his parents have ALFONZO  Bedtime: 9:30-10PM  Wake time: 5AM  He has not had a sleep study    STOP - BANG Questionnaire:     1. Snoring : Do you snore loudly ?    Yes    2. Tired : Do you often feel tired, fatigued, or sleepy during daytime?   Yes    3. Observed: Has anyone observed you stop breathing during your sleep?   No    4. Blood pressure : Do you have or are you being treated for high blood pressure?   No    5. BMI :BMI more than 35 kg/m2?   No    6. Age : Age over 50 yr old?   No    7. Neck circumference: Neck circumference greater than 40 cm?   No    8. Gender: Gender male?   Yes    High risk of ALFONZO: Yes 5 - 8  Intermediate risk of ALFONZO: Yes 3 - 4  Low risk of ALFONZO: Yes 0 - 2      References:   STOP Questionnaire   A Tool to Screen Patients for Obstructive Sleep Apnea: NOÉ Montano.R.C.P.C., Matt Baker M.B.B.S., Sita Ball M.D.,Siri Manzano, Ph.D., MICHELE Prado.B.B.S.,_ MICHELE Jackson.Sc.,_ Regina Dolan M.D., NOÉ Huitron.R.C.P.C.; Anesthesiology 2008; 108:812-21 Copyright © 2008, the American Society of Anesthesiologists, Inc. [employer]       Gilford Questionnaire (validated ALFONZO screening questionnaire)    Yes -- Snoring/apnea    Yes -- Fatigue    Body mass index is Body mass index is 24.94 kg/m²..  (>25 is overweight, >30 is obese)    Blood Pressure = normal blood pressure  (PreHTN 120-139/80-89, Stg1 140-159/90-99, Stg2 >160/>100)  Gilford = two of three ALFONZO categories are positive (high risk is 2-3 positive categories)         5/20/2024      "8:37 AM   EPWORTH SLEEPINESS SCALE   Sitting and reading 0   Watching TV 1   Sitting, inactive in a public place (e.g. a theatre or a meeting) 1   As a passenger in a car for an hour without a break 1   Lying down to rest in the afternoon when circumstances permit 2   Sitting and talking to someone 0   Sitting quietly after a lunch without alcohol 1   In a car, while stopped for a few minutes in traffic 0   Total score 6      (validated sleepiness questionnaire with a higher score indicating greater sleepiness; range 0-24)    Patient Active Problem List   Diagnosis    Personal history of colonic polyps    GERD (gastroesophageal reflux disease)    Colon polyps    IBS (irritable bowel syndrome)    FH: heart disease    FH: diabetes mellitus    History of exposure to toxins via inhalation    Patellofemoral pain syndrome of right knee    Primary osteoarthritis of right knee    Right hip pain         /70   Pulse 75   Resp 20   Ht 5' 11" (1.803 m)   Wt 81.1 kg (178 lb 12.7 oz)   SpO2 98%   BMI 24.94 kg/m²   Body mass index is 24.94 kg/m².    Review of Systems   Constitutional:  Positive for fatigue.   Respiratory:  Positive for snoring.    Musculoskeletal:  Positive for arthralgias.   Psychiatric/Behavioral:  Positive for sleep disturbance.    All other systems reviewed and are negative.        Objective:      Physical Exam  Constitutional:       Appearance: Normal appearance.   HENT:      Head: Normocephalic and atraumatic.      Nose: Nose normal.      Mouth/Throat:      Comments: Mallampati Score: II    Cardiovascular:      Rate and Rhythm: Normal rate and regular rhythm.   Pulmonary:      Effort: Pulmonary effort is normal.      Breath sounds: Normal breath sounds.   Abdominal:      Palpations: Abdomen is soft.      Tenderness: There is no abdominal tenderness.   Musculoskeletal:         General: Normal range of motion.      Cervical back: Normal range of motion and neck supple.   Skin:     General: Skin is " warm and dry.   Neurological:      General: No focal deficit present.      Mental Status: He is alert and oriented to person, place, and time.   Psychiatric:         Mood and Affect: Mood normal.         Behavior: Behavior normal.       Personal Diagnostic Review      Assessment:     1. Sleep disorder breathing    2. Psychophysiological insomnia    3. Nocturia    4. Restless legs       Outpatient Encounter Medications as of 5/20/2024   Medication Sig Dispense Refill    diclofenac (VOLTAREN) 75 MG EC tablet Take 1 tablet (75 mg total) by mouth 2 (two) times daily with meals. 60 tablet 0    famotidine (PEPCID) 20 MG tablet Take 1 tablet (20 mg total) by mouth 2 (two) times daily. 180 tablet 3     No facility-administered encounter medications on file as of 5/20/2024.     Orders Placed This Encounter   Procedures    Polysomnogram (CPAP will be added if patient meets diagnostic criteria.)     Standing Status:   Future     Standing Expiration Date:   5/20/2025     Plan:     1. Sleep disorder breathing  -     Polysomnogram (CPAP will be added if patient meets diagnostic criteria.); Future    2. Psychophysiological insomnia  -     Ambulatory referral/consult to Sleep Disorders    3. Nocturia  -     Ambulatory referral/consult to Sleep Disorders    4. Restless legs  -     Ambulatory referral/consult to Sleep Disorders    No caffeine after lunch.   Sleep study  Following good sleep hygiene  He has psychiatry referral to evaluate for PTSD  Extended release melatonin is helpful

## 2024-05-20 NOTE — PROGRESS NOTES
[facility]   Physical Therapy Treatment Note + Plan of Care     Name: Sacha Fregoso  Clinic Number: [id number]    Therapy Diagnosis:   Encounter Diagnoses   Name Primary?    Patellofemoral pain syndrome of right knee Yes    Primary osteoarthritis of right knee     Right hip pain          Physician: John Ferguson MD     Physician Orders: PT Eval and Treat   Medical Diagnosis from Referral: Right hip pain [M25.551], Primary osteoarthritis of right knee [M17.11], Patellofemoral pain syndrome of right knee [M22.2X1]   Evaluation Date: 12/20/2023  Authorization Period Expiration: 4/12/24  Plan of Care Expiration: 6/10/24  Progress Note Due: 6/13/24  Visit # / Visits authorized: 11/14    Time In: 1:00 pm   Time Out: 2:15 pm   Total Billable Time: 39 minutes  Billing reflects 1:1 direct supervision    MD follow-up:    Precautions: Standard    FOTO:  Goal: 72%  -Intake: 59%  -Status: 56%  -Discharge: incomplete    Subjective     Pt reports: 5/20- He has been feeling pretty good. He had some stiffness and then he after cutting the grass, but it felt better after he changed his shoes. He still has discomfort in the knee whenever he runs too fast.  He was compliant with home exercise program.  Response to previous treatment: tolerated treatment well.   Functional change: improved ability to participate in high level ADL's in a limited fashion.     Pain: 7/10 at worst, 2/10 currently  Location: right knee     Objective      Objective Measures updated at progress report unless specified otherwise.    Primary Impairments:   Thigh strength  Dominant hamstring,   knee valgus,   knee lateral glide syndrome        Treatment     Sacha received the treatments listed below:      Code  (see below chart) Intervention Date/Notes  5/20   MT Manual Thoracic manip  L GH posterior mobs   TE Bike* 5'   TE Dynamic stretch Squat  Lunge   NR Sport cord:  Lateral  Run in place  1' Melecio  Run in place: 2'  "   TA Split squats* 50# DBs 3 x8 ea    TE Sgl HS curls on roller* 3 x 10 ea   TA Decline step down anterior* 25# KBs holds 6" 3 x 12    NR KB RDL SL* 40# 3 x 10   NR Side plank holds* 3 x 45s - lacy   NR Serratus wall slide with overhead reach 2 x 10   NR Prone W to Y shoulder flexion 2 x 10     04 minutes of Therapeutic Exercise (TE) to develop strength, endurance, ROM, and flexibility. (55016)  08 minutes of Manual therapy (MT) to improve pain and ROM. (98276)  27 minutes of Neuromuscular Re-Education (NMR)  to improve: Balance, Coordination, Kinesthetic, Sense, Proprioception, and Posture. (24016)  00 minutes of Therapeutic Activities (TA) to improve functional performance. (08927)  Unattended Electrical Stimulation (ES) for muscle performance and/or pain modulation. (99813)  Vasopneumatic Device Therapy () for management of swelling/edema. (10068)  BFR: Blood flow restriction applied during exercise  NP: Not Performed    * indicates that exercise was performed, not billed today since patient was not under direct one-on-one supervision      Patient Education and Home Exercises         Education provided:   Hip abductor loading progression  Adding specific exercises like ankle mobs, single leg squats, lunges with rotation before running to prime the movements    Written Home Exercises Provided: Patient instructed to cont prior HEP.  Exercises were reviewed and Sacha was able to demonstrate them prior to the end of the session.  Sacha demonstrated good  understanding of the education provided.     See EMR under Patient Instructions for exercises provided prior visit.    Assessment     5/20- Patient was noted to have impaired eccentric control on the right with sports cordactivities. He continues to have impaired scapulohumeral control on the left shoulder improved, scapular rotation, and scapular external rotation with exercises today        Sacha Is progressing well towards his goals.   Pt " prognosis is Good.     Pt will continue to benefit from skilled outpatient physical therapy to address the deficits listed in the problem list box on initial evaluation, provide pt/family education and to maximize pt's level of independence in the home and community environment.     Pt's spiritual, cultural and educational needs considered and pt agreeable to plan of care and goals.    Anticipated barriers to physical therapy: chronicity of condition     Goals:     Short Term Goals:  6 weeks Status  Date Met   PAIN: Pt will report worst pain of 2/10 in order to progress toward max functional ability and improve quality of life. [] Progressing  [x] Met  [] Not Met  3/18/24   FUNCTION: Patient will demonstrate improved function as indicated by a functional score improvement of at least 5 points on FOTO. [] Progressing  [x] Met  [] Not Met  3/18/24   MOBILITY: Patient will improve AROM to 50% of stated goals, listed in objective measures above, in order to progress towards independence with functional activities.  [] Progressing  [x] Met  [] Not Met  3/18/24   STRENGTH: Patient will improve strength to 50% of stated goals, listed in objective measures above, in order to progress towards independence with functional activities. [x] Progressing  [x] Met  [] Not Met  3/18/24   POSTURE: Patient will correct postural deviations in sitting and standing, to decrease pain and promote long term stability.  [] Progressing  [x] Met  [] Not Met  3/18/24   HEP: Patient will demonstrate independence with HEP in order to progress toward functional independence. [] Progressing  [x] Met  [] Not Met  3/18/24      Long Term Goals:  12 weeks Status Date Met   PAIN: Pt will report worst pain of 1/10 in order to progress toward max functional ability and improve quality of life [x] Progressing  [] Met  [] Not Met     FUNCTION: Patient will demonstrate improved function as indicated by a FOTO functional score improvement as listed in header.  [x] Progressing  [] Met  [] Not Met     MOBILITY: Patient will improve AROM to stated goals, listed in objective measures above, in order to return to maximal functional potential and improve quality of life.  [x] Progressing  [x] Met  [] Not Met     STRENGTH: Patient will improve strength to stated goals, listed in objective measures above, in order to improve functional independence and quality of life.  [x] Progressing  [] Met  [] Not Met     GAIT: Patient will demonstrate normalized gait mechanics with minimal compensation in order to return to PLOF. [x] Progressing  [x] Met  [] Not Met     Patient will return to normal ADL's, IADL's, community involvement, recreational activities, and work-related activities with less than or equal to 1/10 pain and maximal function.  [x] Progressing  [] Met  [] Not Met          Plan   Plan of care Certification: 12/20/2023 to 3/20/24.     Biodex test next visit  Continue with abductor loading progression  Continued to address movement deficits      Jv Pedro PT,

## 2024-05-27 ENCOUNTER — CLINICAL SUPPORT (OUTPATIENT)
Facility: HOSPITAL | Age: 44
End: 2024-05-27
Payer: OTHER GOVERNMENT

## 2024-05-27 DIAGNOSIS — M25.551 RIGHT HIP PAIN: ICD-10-CM

## 2024-05-27 DIAGNOSIS — M17.11 PRIMARY OSTEOARTHRITIS OF RIGHT KNEE: ICD-10-CM

## 2024-05-27 DIAGNOSIS — M22.2X1 PATELLOFEMORAL PAIN SYNDROME OF RIGHT KNEE: Primary | ICD-10-CM

## 2024-05-27 PROCEDURE — 97112 NEUROMUSCULAR REEDUCATION: CPT | Mod: PN | Performed by: PHYSICAL THERAPIST

## 2024-05-27 PROCEDURE — 97140 MANUAL THERAPY 1/> REGIONS: CPT | Mod: PN | Performed by: PHYSICAL THERAPIST

## 2024-05-27 NOTE — PROGRESS NOTES
[facility]   Physical Therapy Treatment Note + Plan of Care     Name: Sacha Fregoso  Clinic Number: [id number]    Therapy Diagnosis:   Encounter Diagnoses   Name Primary?    Patellofemoral pain syndrome of right knee Yes    Primary osteoarthritis of right knee     Right hip pain          Physician: John Ferguson MD     Physician Orders: PT Eval and Treat   Medical Diagnosis from Referral: Right hip pain [M25.551], Primary osteoarthritis of right knee [M17.11], Patellofemoral pain syndrome of right knee [M22.2X1]   Evaluation Date: 12/20/2023  Authorization Period Expiration: 4/12/24  Plan of Care Expiration: 6/10/24  Progress Note Due: 6/13/24  Visit # / Visits authorized: 12/14    Time In: 12:30 pm   Time Out: 1:45 pm   Total Billable Time: 40 minutes  Billing reflects 1:1 direct supervision    MD follow-up:    Precautions: Standard    FOTO:  Goal: 72%  -Intake: 59%  -Status: 56%  -Discharge: incomplete    Subjective     Pt reports: 5/27- His knee has been feeling pretty good as long as he doesn't run.  He was compliant with home exercise program.  Response to previous treatment: tolerated treatment well.   Functional change: improved ability to participate in high level ADL's in a limited fashion.     Pain: 7/10 at worst, 2/10 currently  Location: right knee     Objective      Objective Measures updated at progress report unless specified otherwise.    Primary Impairments:   Thigh strength  Dominant hamstring,   knee valgus,   knee lateral glide syndrome    Knee sport cord test- 5/27  Single Leg Knee Bends  Time - 2:30  Score - 5/6  Lateral Agility   Time - full  Score - 4/6  Forward Running   Time - full  Score - 4/4  Backward Running   Time - full  Score - 4/4  ** SCORE: 17/20**     Treatment     Sacha received the treatments listed below:      Code  (see below chart) Intervention Date/Notes  5/27   MT Manual Thoracic manip  R l4-l5 manip   TE Bike 5'   NR Sport  "cord:  Lateral  Run in place  Testing x 25 minutes     Sgl HS curls on roller* 3 x 10 ea   TA Decline step down anterior* 25# KBs holds 6" 3 x 12    TA KB RDL SL* 40# 3 x 10 lacy   NR Side plank holds* 3 x 45s - lacy     5 minutes of Therapeutic Exercise (TE) to develop strength, endurance, ROM, and flexibility. (06719)  10 minutes of Manual therapy (MT) to improve pain and ROM. (07443)  25 minutes of Neuromuscular Re-Education (NMR)  to improve: Balance, Coordination, Kinesthetic, Sense, Proprioception, and Posture. (86091)  00 minutes of Therapeutic Activities (TA) to improve functional performance. (81917)  Unattended Electrical Stimulation (ES) for muscle performance and/or pain modulation. (98898)  Vasopneumatic Device Therapy () for management of swelling/edema. (20499)  BFR: Blood flow restriction applied during exercise  NP: Not Performed    * indicates that exercise was performed, not billed today since patient was not under direct one-on-one supervision      Patient Education and Home Exercises         Education provided:   Hip abductor loading progression  Adding specific exercises like ankle mobs, single leg squats, lunges with rotation before running to prime the movements    Written Home Exercises Provided: Patient instructed to cont prior HEP.  Exercises were reviewed and Sacha was able to demonstrate them prior to the end of the session.  Sacha demonstrated good  understanding of the education provided.     See EMR under Patient Instructions for exercises provided prior visit.    Assessment     5/27-- upon performing the sport cord test today, the patient was noted to have impaired endurance with single leg squat task, as well as impaired mechanics with lateral agility task. Given that his pain tends to increase with higher mileage, you may benefit from continued work on muscular endurance as well as peak strength.        Sacha Is progressing well towards his goals.   Pt prognosis is " Good.     Pt will continue to benefit from skilled outpatient physical therapy to address the deficits listed in the problem list box on initial evaluation, provide pt/family education and to maximize pt's level of independence in the home and community environment.     Pt's spiritual, cultural and educational needs considered and pt agreeable to plan of care and goals.    Anticipated barriers to physical therapy: chronicity of condition     Goals:     Short Term Goals:  6 weeks Status  Date Met   PAIN: Pt will report worst pain of 2/10 in order to progress toward max functional ability and improve quality of life. [] Progressing  [x] Met  [] Not Met  3/18/24   FUNCTION: Patient will demonstrate improved function as indicated by a functional score improvement of at least 5 points on FOTO. [] Progressing  [x] Met  [] Not Met  3/18/24   MOBILITY: Patient will improve AROM to 50% of stated goals, listed in objective measures above, in order to progress towards independence with functional activities.  [] Progressing  [x] Met  [] Not Met  3/18/24   STRENGTH: Patient will improve strength to 50% of stated goals, listed in objective measures above, in order to progress towards independence with functional activities. [x] Progressing  [x] Met  [] Not Met  3/18/24   POSTURE: Patient will correct postural deviations in sitting and standing, to decrease pain and promote long term stability.  [] Progressing  [x] Met  [] Not Met  3/18/24   HEP: Patient will demonstrate independence with HEP in order to progress toward functional independence. [] Progressing  [x] Met  [] Not Met  3/18/24      Long Term Goals:  12 weeks Status Date Met   PAIN: Pt will report worst pain of 1/10 in order to progress toward max functional ability and improve quality of life [x] Progressing  [] Met  [] Not Met     FUNCTION: Patient will demonstrate improved function as indicated by a FOTO functional score improvement as listed in header. [x]  Progressing  [] Met  [] Not Met     MOBILITY: Patient will improve AROM to stated goals, listed in objective measures above, in order to return to maximal functional potential and improve quality of life.  [x] Progressing  [x] Met  [] Not Met     STRENGTH: Patient will improve strength to stated goals, listed in objective measures above, in order to improve functional independence and quality of life.  [x] Progressing  [] Met  [] Not Met     GAIT: Patient will demonstrate normalized gait mechanics with minimal compensation in order to return to PLOF. [x] Progressing  [x] Met  [] Not Met     Patient will return to normal ADL's, IADL's, community involvement, recreational activities, and work-related activities with less than or equal to 1/10 pain and maximal function.  [x] Progressing  [] Met  [] Not Met          Plan   Plan of care Certification: 12/20/2023 to 3/20/24.     Continue with abductor loading progression  Continued to address movement deficits      Jv Pedro, PT,

## 2024-06-03 ENCOUNTER — CLINICAL SUPPORT (OUTPATIENT)
Facility: HOSPITAL | Age: 44
End: 2024-06-03
Payer: OTHER GOVERNMENT

## 2024-06-03 ENCOUNTER — PATIENT MESSAGE (OUTPATIENT)
Dept: PRIMARY CARE CLINIC | Facility: CLINIC | Age: 44
End: 2024-06-03
Payer: OTHER GOVERNMENT

## 2024-06-03 DIAGNOSIS — M17.11 PRIMARY OSTEOARTHRITIS OF RIGHT KNEE: ICD-10-CM

## 2024-06-03 DIAGNOSIS — M22.2X1 PATELLOFEMORAL PAIN SYNDROME OF RIGHT KNEE: Primary | ICD-10-CM

## 2024-06-03 DIAGNOSIS — M25.551 RIGHT HIP PAIN: ICD-10-CM

## 2024-06-03 PROCEDURE — 97140 MANUAL THERAPY 1/> REGIONS: CPT | Mod: PN | Performed by: PHYSICAL THERAPIST

## 2024-06-03 NOTE — PROGRESS NOTES
[facility]   Physical Therapy Treatment Note + Plan of Care     Name: Sacha Fregoso  Clinic Number: [id number]    Therapy Diagnosis:   No diagnosis found.        Physician: John Ferguson MD     Physician Orders: PT Eval and Treat   Medical Diagnosis from Referral: Right hip pain [M25.551], Primary osteoarthritis of right knee [M17.11], Patellofemoral pain syndrome of right knee [M22.2X1]   Evaluation Date: 12/20/2023  Authorization Period Expiration: 4/12/24  Plan of Care Expiration: 6/10/24  Progress Note Due: 6/13/24  Visit # / Visits authorized: 13/14    Time In: 2:32 pm   Time Out: 3:35 pm   Total Billable Time: 15 minutes  Billing reflects 1:1 direct supervision    MD follow-up:    Precautions: Standard    FOTO:  Goal: 72%  -Intake: 59%  -Status: 56%  -Discharge: incomplete    Subjective     Pt reports: His R hip has been a little bit more sore recently. Knee is feeling about the same  He was compliant with home exercise program.  Response to previous treatment: tolerated treatment well.   Functional change: improved ability to participate in high level ADL's in a limited fashion.     Pain: 7/10 at worst, 2/10 currently  Location: right knee     Objective      Objective Measures updated at progress report unless specified otherwise.    Primary Impairments:   Thigh strength  Dominant hamstring,   knee valgus,   knee lateral glide syndrome    Knee sport cord test- 5/27  Single Leg Knee Bends  Time - 2:30  Score - 5/6  Lateral Agility   Time - full  Score - 4/6  Forward Running   Time - full  Score - 4/4  Backward Running   Time - full  Score - 4/4  ** SCORE: 17/20**     Treatment     Sacha received the treatments listed below:      Code  (see below chart) Intervention Date/Notes  6/3   MT Manual Thoracic manip  Hip manip  Lumbar gapping Gr5   TE Bike* 5'   TE Dynamic stretch* Lunge  Walking RDL   NR SL hip abduction  for pain relief 4 x 30s   NR Bridge with band* 4 x  "30s   TE Split squats* 50# DBs 3 x8 ea   TE Sgl HS curls on roller* 3 x 10 ea   TA Decline step down anterior* 25# KB holds 6" 3 x 12   TA KB RDL SL* 40# 3 x 10 lacy   NR Side plank holds* 3 x 45s - lacy   NR Sport cord:*    Lateral  2 x 1 min ea   NR Lateral band walk* GTB 3 laps 10 yds     00 minutes of Therapeutic Exercise (TE) to develop strength, endurance, ROM, and flexibility. (59709)  10 minutes of Manual therapy (MT) to improve pain and ROM. (53906)  05 minutes of Neuromuscular Re-Education (NMR)  to improve: Balance, Coordination, Kinesthetic, Sense, Proprioception, and Posture. (89328)  00 minutes of Therapeutic Activities (TA) to improve functional performance. (45210)  Unattended Electrical Stimulation (ES) for muscle performance and/or pain modulation. (33555)  Vasopneumatic Device Therapy () for management of swelling/edema. (21643)  BFR: Blood flow restriction applied during exercise  NP: Not Performed    * indicates that exercise was performed, not billed today since patient was not under direct one-on-one supervision      Patient Education and Home Exercises         Education provided:   Hip abductor loading progression  Adding specific exercises like ankle mobs, single leg squats, lunges with rotation before running to prime the movements    Written Home Exercises Provided: Patient instructed to cont prior HEP.  Exercises were reviewed and Sacha was able to demonstrate them prior to the end of the session.  Sacha demonstrated good  understanding of the education provided.     See EMR under Patient Instructions for exercises provided prior visit.    Assessment     Lateral hip symptoms seem to be consistent with tendinopathy. This may be due to the sport test that we did last time. Patient noted to have mild lumbosacral hypomobility that was improved with Anshul. Very good tolerance to exercises today.    Sacha Is progressing well towards his goals.   Pt prognosis is Good.     Pt will " continue to benefit from skilled outpatient physical therapy to address the deficits listed in the problem list box on initial evaluation, provide pt/family education and to maximize pt's level of independence in the home and community environment.     Pt's spiritual, cultural and educational needs considered and pt agreeable to plan of care and goals.    Anticipated barriers to physical therapy: chronicity of condition     Goals:     Short Term Goals:  6 weeks Status  Date Met   PAIN: Pt will report worst pain of 2/10 in order to progress toward max functional ability and improve quality of life. [] Progressing  [x] Met  [] Not Met  3/18/24   FUNCTION: Patient will demonstrate improved function as indicated by a functional score improvement of at least 5 points on FOTO. [] Progressing  [x] Met  [] Not Met  3/18/24   MOBILITY: Patient will improve AROM to 50% of stated goals, listed in objective measures above, in order to progress towards independence with functional activities.  [] Progressing  [x] Met  [] Not Met  3/18/24   STRENGTH: Patient will improve strength to 50% of stated goals, listed in objective measures above, in order to progress towards independence with functional activities. [x] Progressing  [x] Met  [] Not Met  3/18/24   POSTURE: Patient will correct postural deviations in sitting and standing, to decrease pain and promote long term stability.  [] Progressing  [x] Met  [] Not Met  3/18/24   HEP: Patient will demonstrate independence with HEP in order to progress toward functional independence. [] Progressing  [x] Met  [] Not Met  3/18/24      Long Term Goals:  12 weeks Status Date Met   PAIN: Pt will report worst pain of 1/10 in order to progress toward max functional ability and improve quality of life [x] Progressing  [] Met  [] Not Met     FUNCTION: Patient will demonstrate improved function as indicated by a FOTO functional score improvement as listed in header. [x] Progressing  [] Met  []  Not Met     MOBILITY: Patient will improve AROM to stated goals, listed in objective measures above, in order to return to maximal functional potential and improve quality of life.  [x] Progressing  [x] Met  [] Not Met     STRENGTH: Patient will improve strength to stated goals, listed in objective measures above, in order to improve functional independence and quality of life.  [x] Progressing  [] Met  [] Not Met     GAIT: Patient will demonstrate normalized gait mechanics with minimal compensation in order to return to PLOF. [x] Progressing  [x] Met  [] Not Met     Patient will return to normal ADL's, IADL's, community involvement, recreational activities, and work-related activities with less than or equal to 1/10 pain and maximal function.  [x] Progressing  [] Met  [] Not Met          Plan   Plan of care Certification: 12/20/2023 to 3/20/24.     Continue with abductor loading progression  Continued to address movement deficits      Jv Pedro, PT,

## 2024-06-10 ENCOUNTER — CLINICAL SUPPORT (OUTPATIENT)
Facility: HOSPITAL | Age: 44
End: 2024-06-10
Payer: OTHER GOVERNMENT

## 2024-06-10 DIAGNOSIS — M17.11 PRIMARY OSTEOARTHRITIS OF RIGHT KNEE: ICD-10-CM

## 2024-06-10 DIAGNOSIS — M22.2X1 PATELLOFEMORAL PAIN SYNDROME OF RIGHT KNEE: Primary | ICD-10-CM

## 2024-06-10 DIAGNOSIS — M25.551 RIGHT HIP PAIN: ICD-10-CM

## 2024-06-10 PROCEDURE — 97110 THERAPEUTIC EXERCISES: CPT | Mod: PN | Performed by: PHYSICAL THERAPIST

## 2024-06-10 NOTE — PROGRESS NOTES
[facility]   Physical Therapy Treatment Note + Plan of Care     Name: Sacha Fregoso  Clinic Number: [id number]    Therapy Diagnosis:   Encounter Diagnoses   Name Primary?    Patellofemoral pain syndrome of right knee Yes    Primary osteoarthritis of right knee     Right hip pain        Physician: John Ferguson MD     Physician Orders: PT Eval and Treat   Medical Diagnosis from Referral: Right hip pain [M25.551], Primary osteoarthritis of right knee [M17.11], Patellofemoral pain syndrome of right knee [M22.2X1]   Evaluation Date: 12/20/2023  Authorization Period Expiration: 4/12/24  Plan of Care Expiration: 6/10/24  Progress Note Due: 7/10/24  Visit # / Visits authorized: 14/14    Time In: 0755 am   Time Out: 0900 am   Total Billable Time: 25 minutes  Billing reflects 1:1 direct supervision    MD follow-up:    Precautions: Standard    FOTO:  Goal: 72%  -Intake: 59%  -Status: 56%  -Discharge: incomplete    Subjective     Pt reports: 6/10- Both knees are a little sore from his week at the beach. Maybe stairs, change in routine, carrying things in the sand.  He was compliant with home exercise program.  Response to previous treatment: tolerated treatment well.   Functional change: improved ability to participate in high level ADL's in a limited fashion.     Pain: 7/10 at worst, 2/10 currently  Location: right knee     Objective      Objective Measures updated at progress report unless specified otherwise.    Primary Impairments:   Thigh strength  Dominant hamstring,   knee valgus,   knee lateral glide syndrome    Knee sport cord test- 5/27  Single Leg Knee Bends  Time - 2:30  Score - 5/6  Lateral Agility   Time - full  Score - 4/6  Forward Running   Time - full  Score - 4/4  Backward Running   Time - full  Score - 4/4  ** SCORE: 17/20**     Hip abductor strength  R- 73.3#  L- 75.4#     Hip extensor strength  R- 4/5  L- 4/5        Single-leg step down assessment: (0-1 good  quality of movement, 2-3 moderate, 4+ poor)                                                                          LLE      RLE              1. Hands off hips                                 []        []               2. Trunk shift                                       []        []               3. Pelvic drop                                      []        []               4. Knee medial to 2nd toe                   [x]        [x]               5. Lift off of medial foot                       []        []               6. Loss of balance                               [x]        []                                                                                                          2/6 1/6        Media Information          Treatment     Sacha received the treatments listed below:      Code  (see below chart) Intervention Date/Notes  6/10   MT Manual  np   TE Bike* 5'   TE Dynamic stretch* Lunge  Walking RDL   TE Biodex testing 25 minutes     25 minutes of Therapeutic Exercise (TE) to develop strength, endurance, ROM, and flexibility. (17948)  00 minutes of Manual therapy (MT) to improve pain and ROM. (28504)  00 minutes of Neuromuscular Re-Education (NMR)  to improve: Balance, Coordination, Kinesthetic, Sense, Proprioception, and Posture. (41091)  00 minutes of Therapeutic Activities (TA) to improve functional performance. (02990)  Unattended Electrical Stimulation (ES) for muscle performance and/or pain modulation. (66452)  Vasopneumatic Device Therapy () for management of swelling/edema. (10864)  BFR: Blood flow restriction applied during exercise  NP: Not Performed    * indicates that exercise was performed, not billed today since patient was not under direct one-on-one supervision      Patient Education and Home Exercises         Education provided:   Hip abductor loading progression  Adding specific exercises like ankle mobs, single leg squats, lunges with rotation before running to prime the  movements    Written Home Exercises Provided: Patient instructed to cont prior HEP.  Exercises were reviewed and Sacha was able to demonstrate them prior to the end of the session.  Sacha demonstrated good  understanding of the education provided.     See EMR under Patient Instructions for exercises provided prior visit.    Assessment     Patient's isokinetic testing shows persistent quad and hamstring deficts that likely contribute to continued dysfunction. His overall single leg stability and motor control have improved well. Patient would benefit from continued skilled care to address persistent strength deficits.    Sacha Is progressing well towards his goals.   Pt prognosis is Good.     Pt will continue to benefit from skilled outpatient physical therapy to address the deficits listed in the problem list box on initial evaluation, provide pt/family education and to maximize pt's level of independence in the home and community environment.     Pt's spiritual, cultural and educational needs considered and pt agreeable to plan of care and goals.    Anticipated barriers to physical therapy: chronicity of condition     Goals:     Short Term Goals:  6 weeks Status  Date Met   PAIN: Pt will report worst pain of 2/10 in order to progress toward max functional ability and improve quality of life. [] Progressing  [x] Met  [] Not Met  3/18/24   FUNCTION: Patient will demonstrate improved function as indicated by a functional score improvement of at least 5 points on FOTO. [] Progressing  [x] Met  [] Not Met  3/18/24   MOBILITY: Patient will improve AROM to 50% of stated goals, listed in objective measures above, in order to progress towards independence with functional activities.  [] Progressing  [x] Met  [] Not Met  3/18/24   STRENGTH: Patient will improve strength to 50% of stated goals, listed in objective measures above, in order to progress towards independence with functional activities. [x]  Progressing  [x] Met  [] Not Met  3/18/24   POSTURE: Patient will correct postural deviations in sitting and standing, to decrease pain and promote long term stability.  [] Progressing  [x] Met  [] Not Met  3/18/24   HEP: Patient will demonstrate independence with HEP in order to progress toward functional independence. [] Progressing  [x] Met  [] Not Met  3/18/24      Long Term Goals:  12 weeks Status Date Met   PAIN: Pt will report worst pain of 1/10 in order to progress toward max functional ability and improve quality of life [x] Progressing  [] Met  [] Not Met     FUNCTION: Patient will demonstrate improved function as indicated by a FOTO functional score improvement as listed in header. [x] Progressing  [] Met  [] Not Met     MOBILITY: Patient will improve AROM to stated goals, listed in objective measures above, in order to return to maximal functional potential and improve quality of life.  [x] Progressing  [x] Met  [] Not Met     STRENGTH: Patient will improve strength to stated goals, listed in objective measures above, in order to improve functional independence and quality of life.  [x] Progressing  [] Met  [] Not Met     GAIT: Patient will demonstrate normalized gait mechanics with minimal compensation in order to return to PLOF. [x] Progressing  [x] Met  [] Not Met     Patient will return to normal ADL's, IADL's, community involvement, recreational activities, and work-related activities with less than or equal to 1/10 pain and maximal function.  [x] Progressing  [] Met  [] Not Met          Plan   Plan of care Certification: 12/20/2023 to 3/20/24.     Continue with abductor loading progression  Continued to address movement deficits      Jv Pedro, PT,

## 2024-06-12 NOTE — PROGRESS NOTES
Patient ID: Sacha Fregoso  YOB: 1980  MRN: [id number]    Chief Complaint: Pain of the Right Knee and Pain of the Right Hip    Referred By: Dr. Knowles    Occupation:       History of Present Illness: Sacha Fregoso is a 43 y.o. male who presents today with Pain of the Right Knee and Pain of the Right Hip    He was initially evaluated in our office on 12/14/23.  He was diagnosed with right knee patellofemoral pain syndrome and right hip TFL syndrome and treated with diclofenac 75mg and PT at [facility].  His symptoms had improved but not resolved at his last visit on 3/14/24.  He was provided work activity modifications.  CSI deferred.  We discussed PRP as an alternative for the future.    Today, he reports that he is making gradual progress in physical therapy.  He is using diclofenac regularly.  He continues to have pain with stairs, squatting or any strenuous running faster than a light jog.  He did not complete the [employer] Test because it was rescheduled so he needs to have his restrictions extended.      Past Medical History:   Past Medical History:   Diagnosis Date    Colon polyp 2008 2013 clear resume at 10 year interval.     GERD (gastroesophageal reflux disease)     Seasonal allergies      Past Surgical History:   Procedure Laterality Date    COLONOSCOPY      COLONOSCOPY N/A 12/27/2023    Procedure: COLONOSCOPY;  Surgeon: Jennifer Mcfarland MD;  Location: [facility];  Service: Endoscopy;  Laterality: N/A;    VASECTOMY      wisdoom teeth       Family History   Problem Relation Name Age of Onset    Diabetes Mother Mom     Hyperlipidemia Father Dad     No Known Problems Sister      No Known Problems Brother      No Known Problems Maternal Aunt      No Known Problems Maternal Uncle      No Known Problems Paternal Aunt      No Known Problems Paternal Uncle      No Known Problems Maternal Grandmother      No Known Problems Maternal Grandfather      COPD  Paternal Grandmother Rissa     No Known Problems Paternal Grandfather      Cancer Paternal Aunt Viktoriya Stout      Social History     Socioeconomic History    Marital status:      Spouse name: Thao    Number of children: 3   Occupational History     Comment: full time [employer]   Tobacco Use    Smoking status: Former     Types: Cigarettes    Smokeless tobacco: Former     Types: Chew   Substance and Sexual Activity    Alcohol use: Yes     Alcohol/week: 1.0 - 2.0 standard drink of alcohol     Types: 1 - 2 Cans of beer per week     Comment: 2x monthly    Drug use: No    Sexual activity: Yes     Partners: Female     Birth control/protection: Partner-Vasectomy   Social History Narrative    Mom is Saranya Fregoso     Social Determinants of Health     Financial Resource Strain: Low Risk  (11/30/2023)    Overall Financial Resource Strain (CARDIA)     Difficulty of Paying Living Expenses: Not hard at all   Food Insecurity: No Food Insecurity (11/30/2023)    Hunger Vital Sign     Worried About Running Out of Food in the Last Year: Never true     Ran Out of Food in the Last Year: Never true   Transportation Needs: No Transportation Needs (11/30/2023)    PRAPARE - Transportation     Lack of Transportation (Medical): No     Lack of Transportation (Non-Medical): No   Physical Activity: Sufficiently Active (11/30/2023)    Exercise Vital Sign     Days of Exercise per Week: 5 days     Minutes of Exercise per Session: 60 min   Stress: No Stress Concern Present (11/30/2023)    [place] [employer] - Occupational Stress Questionnaire     Feeling of Stress : Only a little   Housing Stability: Low Risk  (11/30/2023)    Housing Stability Vital Sign     Unable to Pay for Housing in the Last Year: No     Number of Places Lived in the Last Year: 1     Unstable Housing in the Last Year: No     Medication List with Changes/Refills   Current Medications    DICLOFENAC (VOLTAREN) 75 MG EC TABLET    Take 1 tablet  (75 mg total) by mouth 2 (two) times daily with meals.    FAMOTIDINE (PEPCID) 20 MG TABLET    Take 1 tablet (20 mg total) by mouth 2 (two) times daily.     Review of patient's allergies indicates:  No Known Allergies    Physical Exam:   There is no height or weight on file to calculate BMI.    Physical Exam  Detailed MSK exam:     Right Knee/Hip:  Inspection: No effusion, erythema, or ecchymosis   Palpation tenderness: Tender medial/lateral patellar facet, lateral femoral epicondyle/distal IT band  Range of motion: 0 deg extension - 140 deg flexion  Strength:  5/5 Extension    5/5 Flexion    5/5 Hip Abduction  Stability: Stable ACL/Lachman      Stable Posterior Drawer      Stable MCL/Valgus Stress      Stable LCL/Varus Stress   Patella Exam: Negative J-sign    Negative lateral patellar tracking   Negative Patellar apprehension   Negative Patellar grind   Meniscus Exam: Negative Jorge's        Negative Thessaly's   N/V Exam:  Tibial:    Normal sensory (plantar foot)  Normal motor (FHL)    Sup Peroneal:   Normal sensory (dorsal foot)  Normal motor (Peroneals)            Deep Peroneal:   Normal sensory (1st web space)  Normal motor (EHL)    Sural:   Normal sensory (lateral foot)   Saphenous:   Normal sensory (medial lower leg)   Normal pedal pulses, warm and well perfused with capillary refill < 2 sec     Imaging:    No new imaging today.    Patient Instructions   Assessment:  Sacha Fregoso is a 43 y.o. male with a chief complaint of Pain of the Right Knee and Pain of the Right Hip    Encounter Diagnoses   Name Primary?    Right hip pain Yes    Patellofemoral pain syndrome of right knee     Tensor fascia anderson syndrome     Primary osteoarthritis of right knee       Plan:  Patient with chronic right knee pain, chronic right hip pain, patellofemoral syndrome, and degenerative arthritis of the right knee.  Overall, patient is seeing some improvement.  Has been working diligently with Jv Douglas on therapy and  rehab.  He is seeing improvement in overall pain, dysfunction, as well as overall strength.  Has done several Biodex testing sessions, which still shows some areas of weakness, and areas for improvement.  We will renew PT orders at this time.  If progress plateaus, or pain starts to recur/worsen, can consider for further intervention such as corticosteroid injections, or PRP (platelet rich plasma) injections.  Work/exercise restrictions extended today.  Follow up as needed, based on continued recovery with physical therapy, and if there are any complications along the way    Follow-up:  As needed or sooner if there are any problems between now and then.    Thank you for choosing [facility] and Dr. John Ferguson for your orthopedic & sports medicine care. It is our goal to provide you with exceptional care that will help keep you healthy, active, and get you back in the game.    Please do not hesitate to reach out to us via email, phone, or MyChart with any questions, concerns, or feedback.    If you are experiencing pain/discomfort ,or have questions after 5pm and would like to be connected to the [facility] on-call team, please call this number and specify which Sports Medicine provider is treating you: [phone]       A copy of today's visit note has been sent to the referring provider.           John Ferguson MD  Primary Care Sports Medicine    Disclaimer: This note was prepared using a voice recognition system and is likely to have sound alike errors within the text.

## 2024-06-13 ENCOUNTER — OFFICE VISIT (OUTPATIENT)
Dept: SPORTS MEDICINE | Facility: CLINIC | Age: 44
End: 2024-06-13
Payer: OTHER GOVERNMENT

## 2024-06-13 DIAGNOSIS — M25.551 RIGHT HIP PAIN: Primary | ICD-10-CM

## 2024-06-13 DIAGNOSIS — M17.11 PRIMARY OSTEOARTHRITIS OF RIGHT KNEE: ICD-10-CM

## 2024-06-13 DIAGNOSIS — M62.89 TENSOR FASCIA LATA SYNDROME: ICD-10-CM

## 2024-06-13 DIAGNOSIS — M22.2X1 PATELLOFEMORAL PAIN SYNDROME OF RIGHT KNEE: ICD-10-CM

## 2024-06-13 PROCEDURE — 99999 PR PBB SHADOW E&M-EST. PATIENT-LVL III: CPT | Mod: PBBFAC,,, | Performed by: STUDENT IN AN ORGANIZED HEALTH CARE EDUCATION/TRAINING PROGRAM

## 2024-06-13 PROCEDURE — 99213 OFFICE O/P EST LOW 20 MIN: CPT | Mod: S$PBB,,, | Performed by: STUDENT IN AN ORGANIZED HEALTH CARE EDUCATION/TRAINING PROGRAM

## 2024-06-13 PROCEDURE — 99213 OFFICE O/P EST LOW 20 MIN: CPT | Mod: PBBFAC | Performed by: STUDENT IN AN ORGANIZED HEALTH CARE EDUCATION/TRAINING PROGRAM

## 2024-06-13 NOTE — PATIENT INSTRUCTIONS
Assessment:  Sacha Fregoso is a 43 y.o. male with a chief complaint of Pain of the Right Knee and Pain of the Right Hip    Encounter Diagnoses   Name Primary?    Right hip pain Yes    Patellofemoral pain syndrome of right knee     Tensor fascia anderson syndrome     Primary osteoarthritis of right knee       Plan:  Patient with chronic right knee pain, chronic right hip pain, patellofemoral syndrome, and degenerative arthritis of the right knee.  Overall, patient is seeing some improvement.  Has been working diligently with Jv Douglas on therapy and rehab.  He is seeing improvement in overall pain, dysfunction, as well as overall strength.  Has done several Biodex testing sessions, which still shows some areas of weakness, and areas for improvement.  We will renew PT orders at this time.  If progress plateaus, or pain starts to recur/worsen, can consider for further intervention such as corticosteroid injections, or PRP (platelet rich plasma) injections.  Work/exercise restrictions extended today.  Follow up as needed, based on continued recovery with physical therapy, and if there are any complications along the way    Follow-up:  As needed or sooner if there are any problems between now and then.    Thank you for choosing [facility] and Dr. John Ferguson for your orthopedic & sports medicine care. It is our goal to provide you with exceptional care that will help keep you healthy, active, and get you back in the game.    Please do not hesitate to reach out to us via email, phone, or MyChart with any questions, concerns, or feedback.    If you are experiencing pain/discomfort ,or have questions after 5pm and would like to be connected to the [facility] on-call team, please call this number and specify which Sports Medicine provider is treating you: [phone]

## 2024-06-16 ENCOUNTER — HOSPITAL ENCOUNTER (OUTPATIENT)
Dept: SLEEP MEDICINE | Facility: HOSPITAL | Age: 44
Discharge: HOME | End: 2024-06-16
Attending: NURSE PRACTITIONER
Payer: OTHER GOVERNMENT

## 2024-06-16 DIAGNOSIS — G47.61 PLMD (PERIODIC LIMB MOVEMENT DISORDER): ICD-10-CM

## 2024-06-16 DIAGNOSIS — G47.30 SLEEP DISORDER BREATHING: ICD-10-CM

## 2024-06-16 DIAGNOSIS — F51.02 ADJUSTMENT INSOMNIA: Primary | ICD-10-CM

## 2024-06-16 PROCEDURE — 95810 POLYSOM 6/> YRS 4/> PARAM: CPT

## 2024-06-17 ENCOUNTER — TELEPHONE (OUTPATIENT)
Dept: PSYCHIATRY | Facility: CLINIC | Age: 44
End: 2024-06-17
Payer: OTHER GOVERNMENT

## 2024-06-17 PROBLEM — G47.30 SLEEP DISORDER BREATHING: Status: ACTIVE | Noted: 2024-06-17

## 2024-06-17 NOTE — TELEPHONE ENCOUNTER
----- Message from Kinjal Montilla MA sent at 6/17/2024  9:24 AM CDT -----  Regarding: Referral  Contact: Sacha Leone morning, This is for testing. Have a great day.  ----- Message -----  From: Nina Paulino  Sent: 6/17/2024   8:13 AM CDT  To: [facility] Psych Clinical Staff    .Patient is calling to speak with the nurse regarding referral . Reports needing to schedule np from referral  . Please give patient a call back at   [phone]

## 2024-06-19 ENCOUNTER — CLINICAL SUPPORT (OUTPATIENT)
Facility: HOSPITAL | Age: 44
End: 2024-06-19
Payer: OTHER GOVERNMENT

## 2024-06-19 DIAGNOSIS — M22.2X1 PATELLOFEMORAL PAIN SYNDROME OF RIGHT KNEE: Primary | ICD-10-CM

## 2024-06-19 DIAGNOSIS — M25.551 RIGHT HIP PAIN: ICD-10-CM

## 2024-06-19 DIAGNOSIS — M17.11 PRIMARY OSTEOARTHRITIS OF RIGHT KNEE: ICD-10-CM

## 2024-06-19 PROCEDURE — 97112 NEUROMUSCULAR REEDUCATION: CPT | Mod: PN | Performed by: PHYSICAL THERAPIST

## 2024-06-19 PROCEDURE — 97140 MANUAL THERAPY 1/> REGIONS: CPT | Mod: PN | Performed by: PHYSICAL THERAPIST

## 2024-06-19 NOTE — PROGRESS NOTES
[facility]   Physical Therapy Treatment Note + Plan of Care     Name: Sacha Fregoso  Clinic Number: [id number]    Therapy Diagnosis:   Encounter Diagnoses   Name Primary?    Patellofemoral pain syndrome of right knee Yes    Primary osteoarthritis of right knee     Right hip pain        Physician: John Ferguson MD     Physician Orders: PT Eval and Treat   Medical Diagnosis from Referral: Right hip pain [M25.551], Primary osteoarthritis of right knee [M17.11], Patellofemoral pain syndrome of right knee [M22.2X1]   Evaluation Date: 12/20/2023  Authorization Period Expiration: 4/12/24  Plan of Care Expiration: 8/10/24  Progress Note Due: 7/10/24  Visit # / Visits authorized: 15/14    Time In: 0900 am   Time Out: 1015 am   Total Billable Time: 28 minutes  Billing reflects 1:1 direct supervision    MD follow-up:    Precautions: Standard    FOTO:  Goal: 72%  -Intake: 59%  -Status: 56%  -Discharge: incomplete    Subjective     Pt reports: 6/19 - He followed up with doctor Marty who recommended he continue therapy period his left knee has actually started to hurt recently.  He was compliant with home exercise program.  Response to previous treatment: tolerated treatment well.   Functional change: improved ability to participate in high level ADL's in a limited fashion.     Pain: 7/10 at worst, 2/10 currently  Location: right knee     Objective      Objective Measures updated at progress report unless specified otherwise.    Primary Impairments:   Thigh strength  Dominant hamstring,   knee valgus,   knee lateral glide syndrome    Treatment     Sacha received the treatments listed below:      Code  (see below chart) Intervention Date/Notes  6/19   MT Manual Ankle AP mobs  Ankle distraction Gr5     TE Ankle mobs with band 20x   TE Bike* 5'   NR SL hip abduction  for pain relief 4 x 30s   NR Bridge with band 4 x 30s   TE Dynamic stretch NP      TE Split squats 50# DBs 3 x8 ea   TE  Sgl HS curls on roller* 3 x 10 ea   TA KB RDL SL* 40# 3 x 10 lacy   NR Side plank holds* 3 x 45s - lacy   NR Sport cord:*    Lateral  2 x 1 min ea   NR Lateral band walk* GTB 3 laps 10 yds    NR Lateral step downs with paper* 2 x 15 6'     05 minutes of Therapeutic Exercise (TE) to develop strength, endurance, ROM, and flexibility. (29134)  15 minutes of Manual therapy (MT) to improve pain and ROM. (73657)  08 minutes of Neuromuscular Re-Education (NMR)  to improve: Balance, Coordination, Kinesthetic, Sense, Proprioception, and Posture. (06571)  00 minutes of Therapeutic Activities (TA) to improve functional performance. (65267)  Unattended Electrical Stimulation (ES) for muscle performance and/or pain modulation. (26170)  Vasopneumatic Device Therapy () for management of swelling/edema. (75843)  BFR: Blood flow restriction applied during exercise  NP: Not Performed    * indicates that exercise was performed, not billed today since patient was not under direct one-on-one supervision      Patient Education and Home Exercises         Education provided:   Hip abductor loading progression  Adding specific exercises like ankle mobs, single leg squats, lunges with rotation before running to prime the movements    Written Home Exercises Provided: Patient instructed to cont prior HEP.  Exercises were reviewed and Sacha was able to demonstrate them prior to the end of the session.  Sacha demonstrated good  understanding of the education provided.     See EMR under Patient Instructions for exercises provided prior visit.    Assessment     6/19 - Patient noted to have left ankle mobility deficits that may contribute to symptoms. Improve mobility with manual therapy. Patient would benefit from continue work on building five strength to offload the joint.    Sacha Is progressing well towards his goals.   Pt prognosis is Good.     Pt will continue to benefit from skilled outpatient physical therapy to address the  deficits listed in the problem list box on initial evaluation, provide pt/family education and to maximize pt's level of independence in the home and community environment.     Pt's spiritual, cultural and educational needs considered and pt agreeable to plan of care and goals.    Anticipated barriers to physical therapy: chronicity of condition     Goals:     Short Term Goals:  6 weeks Status  Date Met   PAIN: Pt will report worst pain of 2/10 in order to progress toward max functional ability and improve quality of life. [] Progressing  [x] Met  [] Not Met  3/18/24   FUNCTION: Patient will demonstrate improved function as indicated by a functional score improvement of at least 5 points on FOTO. [] Progressing  [x] Met  [] Not Met  3/18/24   MOBILITY: Patient will improve AROM to 50% of stated goals, listed in objective measures above, in order to progress towards independence with functional activities.  [] Progressing  [x] Met  [] Not Met  3/18/24   STRENGTH: Patient will improve strength to 50% of stated goals, listed in objective measures above, in order to progress towards independence with functional activities. [x] Progressing  [x] Met  [] Not Met  3/18/24   POSTURE: Patient will correct postural deviations in sitting and standing, to decrease pain and promote long term stability.  [] Progressing  [x] Met  [] Not Met  3/18/24   HEP: Patient will demonstrate independence with HEP in order to progress toward functional independence. [] Progressing  [x] Met  [] Not Met  3/18/24      Long Term Goals:  12 weeks Status Date Met   PAIN: Pt will report worst pain of 1/10 in order to progress toward max functional ability and improve quality of life [x] Progressing  [] Met  [] Not Met     FUNCTION: Patient will demonstrate improved function as indicated by a FOTO functional score improvement as listed in header. [x] Progressing  [] Met  [] Not Met     MOBILITY: Patient will improve AROM to stated goals, listed in  objective measures above, in order to return to maximal functional potential and improve quality of life.  [x] Progressing  [x] Met  [] Not Met     STRENGTH: Patient will improve strength to stated goals, listed in objective measures above, in order to improve functional independence and quality of life.  [x] Progressing  [] Met  [] Not Met     GAIT: Patient will demonstrate normalized gait mechanics with minimal compensation in order to return to PLOF. [x] Progressing  [x] Met  [] Not Met     Patient will return to normal ADL's, IADL's, community involvement, recreational activities, and work-related activities with less than or equal to 1/10 pain and maximal function.  [x] Progressing  [] Met  [] Not Met          Plan   Plan of care Certification: 12/20/2023 to 3/20/24.     Continue with abductor loading progression  Continued to address movement deficits      Jv Pedro, PT,

## 2024-06-19 NOTE — PROCEDURES
"Patient Name: Sacha Fregoso Study Date: 6/16/2024   YOB: 1980 Study Type: PSG   Age: 43 year Patient #: [id number]   Sex: Male Billing ID: -   Height: 5' 11" Interpreting Physician: Fco Medel MD   Weight: 180.0 lbs Recording Tech: Jose Angel Beatty   BMI: 25.2 Scoring Tech: Zuri Lopez   [place]: 6 Neck Circumference: 17     Indications for Polysomnography  The patient is a 43 year-old Male who is 5' 11" and weighs 180.0 lbs. His BMI equals 25.2.  A full night polysomnogram was performed to evaluate for -.Sleep disorder breathing     Referring provider: Elizabeth Lejeune NP    Medical History: atient with snoring. Patient not having problems falling asleep, but wakes up frequently throughout the night. Difficulty falling back asleep.   Patient does not wake up feeling refreshed in the morning.  Patient with daytime hypersomnolence. Both of his parents have ALFONZO  Bedtime: 9:30-10PM  Wake time: 5AM  He has not had a sleep study  [place] = two of three ALFONZO categories are positive (high risk is 2-3 positive categories)     STOP - BANG Questionnaire: 3  Bedtime 10:00 p.m. wake-up time 5:15 a.m..  Sleep latency 15 minutes.    Denies restless legs   Denies sleep attacks   No signs related to narcolepsy   has teeth grinding   Indigestion and heartburn at night   Physical discomfort at night and pain most often      Medication   diclofenac (VOLTAREN) 75 MG EC tablet   -     Test Description  Patient was connected to a full set of leads with a sleep technician in attendance.  Parameters used were EEG derivations (F4-A1, C4-A1, O2-A1), EOG derivations (LOC-A2, MAYNOR-A2), EKG, EMG - extremity (leg) & chin, oxygen saturation, effort parameters + abdominal/thoracic (RIP), and airflow parameters - nasal pressure/thermal.  Body position was visually monitored and noted.  Audio & video monitoring was performed during study.    Scoring is done in accordance with the American Academy of Sleep Medicine Manual for " the Scoring of Sleep and Associated Events version 2.6.  Hypopneas are scored using the 4% desaturation rule.    Sleep Architecture  The total recording time of the polysomnogram was 461.6 minutes.  The total sleep time was 295.0 minutes.  The patient spent 10.3% of total sleep time in Stage N1, 42.0% in Stage N2, 20.8% in Stages N3, and 26.8% in REM.  Sleep latency was 42.3 minutes.  REM latency was 41.0 minutes.  Sleep Efficiency was 63.9%.  Wake after Sleep Onset time was 124.5 minutes.    Respiratory Events  The polysomnogram revealed a presence of - obstructive, 1 central, and - mixed apneas resulting in an Apnea index of 0.2 events per hour.  There were 1 hypopneas (?3% desat and/or Ar.) resulting in an Apnea\Hypopnea Index (AHI ?3% desat and/or Ar.) of 0.4 events per hour.  There were 1 hypopneas (?4% desat) resulting in an Apnea\Hypopnea Index (AHI ?4% desat) of 0.4 events per hour.  There were - Respiratory Effort Related Arousals resulting in a RERA index of - events per hour. The Respiratory Disturbance Index is 0.4 events per hour.     Mean oxygen saturation was 95.3%.  The lowest oxygen saturation during sleep was 92.0%.  Time spent ?88% oxygen saturation was - minutes (-).    Limb Activity  There were 47 total limb movements recorded, of this total, 47 were classified as PLMs.  PLM index was 9.6 per hour and PLM associated with Arousals index was 1.2 per hour.    Cardiac Summary  The average pulse rate was 51.5 bpm.  The minimum pulse rate was 44.0 bpm while the maximum pulse rate was 96.0 bpm.  Cardiac rhythm was normal/abnormal.    Behavioral observation:    Awake 9:19 p.m. to 10:01 p.m., 10:54 p.m. to 11:05 p.m., 12:20 a.m. to 1:03 a.m., 1:10 a.m. to 1:20 a.m., 2:30 a.m. to 2:41 a.m., 3 for 3:34 a.m. to 3:50 a.m., 4:09 a.m. to 4:34 a.m..    Conclusion:   Overall AHI was 0.4 events per hour.  No criteria for obstructive sleep apnea   Very poor sleep efficiency: 63.9%.  Wake after sleep onset was  "high.  PLM index:  1.2 events per hour very       Diagnosis: Adjustment Insomnia / 307.41   PLMD       Recommendations:     Sleep hygiene   Cognitive behavioral therapy for insomnia   Dental evaluation for bruxism      [place]  [address] [facility]  [place] [id number]/Zuri Knowles MD         The sleep study that you ordered is complete.  You have ordered sleep LAB services to perform the sleep study for Sacha Fregoso .      Please find Sleep Study result in  the "Media tab" of Chart Review menu.        You can look  for the report in the  Media by the document type "Sleep Study Documents". Alphabetizing  "Document type" column helps to find the SLEEP STUDY report  Faster.       As the ordering provider, you are responsible for reviewing the results and implementing a treatment plan with your patient.    If you need a Sleep Medicine provider to explain the sleep study findings and arrange treatment for the patient, please refer patient for consultation to our Sleep Clinic via [facility] with Ambulatory Consult Sleep.     To do that please place an order for an  "Ambulatory Consult Sleep" -  order , it will go to our clinic work queue for our staff  to contact the patient for an appointment.      For any questions, please contact our sleep lab  staff at [phone] to talk to clinical staff          Fco Medel MD   "

## 2024-06-26 ENCOUNTER — CLINICAL SUPPORT (OUTPATIENT)
Facility: HOSPITAL | Age: 44
End: 2024-06-26
Payer: OTHER GOVERNMENT

## 2024-06-26 DIAGNOSIS — M25.551 RIGHT HIP PAIN: ICD-10-CM

## 2024-06-26 DIAGNOSIS — M17.11 PRIMARY OSTEOARTHRITIS OF RIGHT KNEE: ICD-10-CM

## 2024-06-26 DIAGNOSIS — M22.2X1 PATELLOFEMORAL PAIN SYNDROME OF RIGHT KNEE: Primary | ICD-10-CM

## 2024-06-26 PROCEDURE — 97140 MANUAL THERAPY 1/> REGIONS: CPT | Mod: PN | Performed by: PHYSICAL THERAPIST

## 2024-06-26 PROCEDURE — 97112 NEUROMUSCULAR REEDUCATION: CPT | Mod: PN | Performed by: PHYSICAL THERAPIST

## 2024-06-26 NOTE — PROGRESS NOTES
[facility]   Physical Therapy Treatment Note + Plan of Care     Name: Sacha Fregoso  Clinic Number: [id number]    Therapy Diagnosis:   Encounter Diagnoses   Name Primary?    Patellofemoral pain syndrome of right knee Yes    Primary osteoarthritis of right knee     Right hip pain        Physician: John Ferguson MD     Physician Orders: PT Eval and Treat   Medical Diagnosis from Referral: Right hip pain [M25.551], Primary osteoarthritis of right knee [M17.11], Patellofemoral pain syndrome of right knee [M22.2X1]   Evaluation Date: 12/20/2023  Authorization Period Expiration: 4/12/24  Plan of Care Expiration: 8/10/24  Progress Note Due: 7/10/24  Visit # / Visits authorized: 16/30    Time In: 08:53 am   Time Out: 9:06 am   Total Billable Time: 28 minutes  Billing reflects 1:1 direct supervision    MD follow-up:    Precautions: Standard    FOTO:  Goal: 72%  -Intake: 59%  -Status: 56%  -Discharge: incomplete    Subjective     Pt reports: 6/26- L knee has been feeling achy. R knee has been feeling okay. He jogged around 3 miles this morning.  He was compliant with home exercise program.  Response to previous treatment: tolerated treatment well.   Functional change: improved ability to participate in high level ADL's in a limited fashion.     Pain: 7/10 at worst, 2/10 currently  Location: right knee     Objective      Objective Measures updated at progress report unless specified otherwise.    Primary Impairments:   Thigh strength  Dominant hamstring,   knee valgus,   knee lateral glide syndrome    Treatment     Sacha received the treatments listed below:      Code  (see below chart) Intervention Date/Notes  6/26   MT Manual Ankle AP mobs  Ankle distraction Gr5   TE Bike* 5'   TE Ankle mobs BPB 20x ea   NR Lateral step downs with paper 2 x 15 6'   TE Split squats* 50# DBs 3 x8 ea   NR Bridge elevated sgl* 30# sandbag 3 x 10   TE Sgl HS curls on roller* 3 x 10 ea   TE Knee  extension tempo* 3 x 8    TA KB RDL SL 50# 3 x 10 lacy   NR Side plank hip abduction 3 x 10 - lacy   NR Lateral band walk* GTB 3 laps 10 yds     05 minutes of Therapeutic Exercise (TE) to develop strength, endurance, ROM, and flexibility. (41979)  08 minutes of Manual therapy (MT) to improve pain and ROM. (35496)  10 minutes of Neuromuscular Re-Education (NMR)  to improve: Balance, Coordination, Kinesthetic, Sense, Proprioception, and Posture. (99440)  05 minutes of Therapeutic Activities (TA) to improve functional performance. (53057)  Unattended Electrical Stimulation (ES) for muscle performance and/or pain modulation. (96461)  Vasopneumatic Device Therapy () for management of swelling/edema. (82743)  BFR: Blood flow restriction applied during exercise  NP: Not Performed    * indicates that exercise was performed, not billed today since patient was not under direct one-on-one supervision      Patient Education and Home Exercises         Education provided:   Hip abductor loading progression  Adding specific exercises like ankle mobs, single leg squats, lunges with rotation before running to prime the movements    Written Home Exercises Provided: Patient instructed to cont prior HEP.  Exercises were reviewed and Sacha was able to demonstrate them prior to the end of the session.  Sacha demonstrated good  understanding of the education provided.     See EMR under Patient Instructions for exercises provided prior visit.    Assessment     6/26- the patient continues to have left ankle mobility deficits, improved with manual and exercise. Patient would continue to benefit from interventions addressing strength and motor control deficits to improve overall function.    Sacha Is progressing well towards his goals.   Pt prognosis is Good.     Pt will continue to benefit from skilled outpatient physical therapy to address the deficits listed in the problem list box on initial evaluation, provide pt/family  education and to maximize pt's level of independence in the home and community environment.     Pt's spiritual, cultural and educational needs considered and pt agreeable to plan of care and goals.    Anticipated barriers to physical therapy: chronicity of condition     Goals:     Short Term Goals:  6 weeks Status  Date Met   PAIN: Pt will report worst pain of 2/10 in order to progress toward max functional ability and improve quality of life. [] Progressing  [x] Met  [] Not Met  3/18/24   FUNCTION: Patient will demonstrate improved function as indicated by a functional score improvement of at least 5 points on FOTO. [] Progressing  [x] Met  [] Not Met  3/18/24   MOBILITY: Patient will improve AROM to 50% of stated goals, listed in objective measures above, in order to progress towards independence with functional activities.  [] Progressing  [x] Met  [] Not Met  3/18/24   STRENGTH: Patient will improve strength to 50% of stated goals, listed in objective measures above, in order to progress towards independence with functional activities. [x] Progressing  [x] Met  [] Not Met  3/18/24   POSTURE: Patient will correct postural deviations in sitting and standing, to decrease pain and promote long term stability.  [] Progressing  [x] Met  [] Not Met  3/18/24   HEP: Patient will demonstrate independence with HEP in order to progress toward functional independence. [] Progressing  [x] Met  [] Not Met  3/18/24      Long Term Goals:  12 weeks Status Date Met   PAIN: Pt will report worst pain of 1/10 in order to progress toward max functional ability and improve quality of life [x] Progressing  [] Met  [] Not Met     FUNCTION: Patient will demonstrate improved function as indicated by a FOTO functional score improvement as listed in header. [x] Progressing  [] Met  [] Not Met     MOBILITY: Patient will improve AROM to stated goals, listed in objective measures above, in order to return to maximal functional potential and  improve quality of life.  [x] Progressing  [x] Met  [] Not Met     STRENGTH: Patient will improve strength to stated goals, listed in objective measures above, in order to improve functional independence and quality of life.  [x] Progressing  [] Met  [] Not Met     GAIT: Patient will demonstrate normalized gait mechanics with minimal compensation in order to return to PLOF. [x] Progressing  [x] Met  [] Not Met     Patient will return to normal ADL's, IADL's, community involvement, recreational activities, and work-related activities with less than or equal to 1/10 pain and maximal function.  [x] Progressing  [] Met  [] Not Met          Plan   Plan of care Certification: 12/20/2023 to 3/20/24.     Continue with abductor loading progression  Continued to address movement deficits      Jv Pedro PT,

## 2024-06-27 ENCOUNTER — CLINICAL SUPPORT (OUTPATIENT)
Facility: HOSPITAL | Age: 44
End: 2024-06-27
Payer: OTHER GOVERNMENT

## 2024-06-27 DIAGNOSIS — M17.11 PRIMARY OSTEOARTHRITIS OF RIGHT KNEE: ICD-10-CM

## 2024-06-27 DIAGNOSIS — M25.551 RIGHT HIP PAIN: ICD-10-CM

## 2024-06-27 DIAGNOSIS — M22.2X1 PATELLOFEMORAL PAIN SYNDROME OF RIGHT KNEE: Primary | ICD-10-CM

## 2024-06-27 PROCEDURE — 97112 NEUROMUSCULAR REEDUCATION: CPT | Mod: PN | Performed by: PHYSICAL THERAPIST

## 2024-06-27 PROCEDURE — 97530 THERAPEUTIC ACTIVITIES: CPT | Mod: PN | Performed by: PHYSICAL THERAPIST

## 2024-06-28 NOTE — PROGRESS NOTES
"  [facility]   Physical Therapy Treatment Note + Plan of Care     Name: Sacha Fregoso  Clinic Number: [id number]    Therapy Diagnosis:   Encounter Diagnoses   Name Primary?    Patellofemoral pain syndrome of right knee Yes    Primary osteoarthritis of right knee     Right hip pain        Physician: John Ferguson MD     Physician Orders: PT Eval and Treat   Medical Diagnosis from Referral: Right hip pain [M25.551], Primary osteoarthritis of right knee [M17.11], Patellofemoral pain syndrome of right knee [M22.2X1]   Evaluation Date: 12/20/2023  Authorization Period Expiration: 4/12/24  Plan of Care Expiration: 8/10/24  Progress Note Due: 7/10/24  Visit # / Visits authorized: 17/30    Time In: 3:25 pm   Time Out: 4:31 pm   Total Billable Time: 36 minutes  Billing reflects 1:1 direct supervision    MD follow-up:    Precautions: Standard    FOTO:  Goal: 72%  -Intake: 59%  -Status: 56%  -Discharge: incomplete    Subjective     Pt reports: 6/27- Muscles feel a little bit sore from yesterday. Knees are feeling okay today.  He was compliant with home exercise program.  Response to previous treatment: tolerated treatment well.   Functional change: improved ability to participate in high level ADL's in a limited fashion.     Pain: 7/10 at worst, 2/10 currently  Location: right knee     Objective      Objective Measures updated at progress report unless specified otherwise.    Primary Impairments:   Thigh strength  Dominant hamstring,   knee valgus,   knee lateral glide syndrome    Treatment     Sacha received the treatments listed below:      Code  (see below chart) Intervention Date/Notes  6/27   MT Manual Ankle AP mobs  Ankle distraction Gr5   TE* Bike 5'   TE Ankle mobs BPB 20x ea    TA Plyometrics Skips  Drop jumps 12"  Lateral hops over jenny    NR* Balance Y balance on flat side of BOSU      NR Face pull with scap retraction BTBs 3 x 12    NR KB press() 3x12 20#     04 " minutes of Therapeutic Exercise (TE) to develop strength, endurance, ROM, and flexibility. (15997)  08 minutes of Manual therapy (MT) to improve pain and ROM. (47415)  14 minutes of Neuromuscular Re-Education (NMR)  to improve: Balance, Coordination, Kinesthetic, Sense, Proprioception, and Posture. (57162)  10 minutes of Therapeutic Activities (TA) to improve functional performance. (52270)  Unattended Electrical Stimulation (ES) for muscle performance and/or pain modulation. (46105)  Vasopneumatic Device Therapy () for management of swelling/edema. (52546)  BFR: Blood flow restriction applied during exercise  NP: Not Performed    * indicates that exercise was performed, not billed today since patient was not under direct one-on-one supervision      Patient Education and Home Exercises         Education provided:   Hip abductor loading progression  Adding specific exercises like ankle mobs, single leg squats, lunges with rotation before running to prime the movements    Written Home Exercises Provided: Patient instructed to cont prior HEP.  Exercises were reviewed and Sacha was able to demonstrate them prior to the end of the session.  Sacha demonstrated good  understanding of the education provided.     See EMR under Patient Instructions for exercises provided prior visit.    Assessment     6/27- Patient did well with exercise program today. We opted to focus more on power production and balance since we had a heavy strength day yesterday. Patient continues to have impared scapulohumeral stability on the L which seems to contribute to continued shoulder pain.    Sacha Is progressing well towards his goals.   Pt prognosis is Good.     Pt will continue to benefit from skilled outpatient physical therapy to address the deficits listed in the problem list box on initial evaluation, provide pt/family education and to maximize pt's level of independence in the home and community environment.     Pt's  spiritual, cultural and educational needs considered and pt agreeable to plan of care and goals.    Anticipated barriers to physical therapy: chronicity of condition     Goals:     Short Term Goals:  6 weeks Status  Date Met   PAIN: Pt will report worst pain of 2/10 in order to progress toward max functional ability and improve quality of life. [] Progressing  [x] Met  [] Not Met  3/18/24   FUNCTION: Patient will demonstrate improved function as indicated by a functional score improvement of at least 5 points on FOTO. [] Progressing  [x] Met  [] Not Met  3/18/24   MOBILITY: Patient will improve AROM to 50% of stated goals, listed in objective measures above, in order to progress towards independence with functional activities.  [] Progressing  [x] Met  [] Not Met  3/18/24   STRENGTH: Patient will improve strength to 50% of stated goals, listed in objective measures above, in order to progress towards independence with functional activities. [x] Progressing  [x] Met  [] Not Met  3/18/24   POSTURE: Patient will correct postural deviations in sitting and standing, to decrease pain and promote long term stability.  [] Progressing  [x] Met  [] Not Met  3/18/24   HEP: Patient will demonstrate independence with HEP in order to progress toward functional independence. [] Progressing  [x] Met  [] Not Met  3/18/24      Long Term Goals:  12 weeks Status Date Met   PAIN: Pt will report worst pain of 1/10 in order to progress toward max functional ability and improve quality of life [x] Progressing  [] Met  [] Not Met     FUNCTION: Patient will demonstrate improved function as indicated by a FOTO functional score improvement as listed in header. [x] Progressing  [] Met  [] Not Met     MOBILITY: Patient will improve AROM to stated goals, listed in objective measures above, in order to return to maximal functional potential and improve quality of life.  [x] Progressing  [x] Met  [] Not Met     STRENGTH: Patient will improve  strength to stated goals, listed in objective measures above, in order to improve functional independence and quality of life.  [x] Progressing  [] Met  [] Not Met     GAIT: Patient will demonstrate normalized gait mechanics with minimal compensation in order to return to PLOF. [x] Progressing  [x] Met  [] Not Met     Patient will return to normal ADL's, IADL's, community involvement, recreational activities, and work-related activities with less than or equal to 1/10 pain and maximal function.  [x] Progressing  [] Met  [] Not Met          Plan       Continue with abductor loading progression  Continued to address movement deficits      Jv Pedro PT,

## 2024-07-01 ENCOUNTER — CLINICAL SUPPORT (OUTPATIENT)
Facility: HOSPITAL | Age: 44
End: 2024-07-01
Payer: OTHER GOVERNMENT

## 2024-07-01 DIAGNOSIS — M17.11 PRIMARY OSTEOARTHRITIS OF RIGHT KNEE: ICD-10-CM

## 2024-07-01 DIAGNOSIS — M22.2X1 PATELLOFEMORAL PAIN SYNDROME OF RIGHT KNEE: Primary | ICD-10-CM

## 2024-07-01 DIAGNOSIS — M25.551 RIGHT HIP PAIN: ICD-10-CM

## 2024-07-01 PROCEDURE — 97140 MANUAL THERAPY 1/> REGIONS: CPT | Mod: PN | Performed by: PHYSICAL THERAPIST

## 2024-07-01 PROCEDURE — 97110 THERAPEUTIC EXERCISES: CPT | Mod: PN | Performed by: PHYSICAL THERAPIST

## 2024-07-01 NOTE — PROGRESS NOTES
[facility]   Physical Therapy Treatment Note + Plan of Care     Name: Sacha Fregoso  Clinic Number: [id number]    Therapy Diagnosis:   Encounter Diagnoses   Name Primary?    Patellofemoral pain syndrome of right knee Yes    Primary osteoarthritis of right knee     Right hip pain        Physician: John Ferguson MD     Physician Orders: PT Eval and Treat   Medical Diagnosis from Referral: Right hip pain [M25.551], Primary osteoarthritis of right knee [M17.11], Patellofemoral pain syndrome of right knee [M22.2X1]   Evaluation Date: 12/20/2023  Authorization Period Expiration: 4/12/24  Plan of Care Expiration: 8/10/24  Progress Note Due: 7/10/24  Visit # / Visits authorized: 18/30    Time In: 1:55 pm   Time Out: 3:02 pm   Total Billable Time: 25 minutes  Billing reflects 1:1 direct supervision    MD follow-up:    Precautions: Standard    FOTO:  Goal: 72%  -Intake: 59%  -Status: 56%  -Discharge: incomplete    Subjective     Pt reports: 7/1- Feeling okay today. He did a lot of yard work this weekend and didn't have significant knee pain afterwards.  He was compliant with home exercise program.  Response to previous treatment: tolerated treatment well.   Functional change: improved ability to participate in high level ADL's in a limited fashion.     Pain: 7/10 at worst, 2/10 currently  Location: right knee     Objective      Objective Measures updated at progress report unless specified otherwise.    Primary Impairments:   Thigh strength  Dominant hamstring,   knee valgus,   knee lateral glide syndrome    Treatment     Sacha received the treatments listed below:      Code  (see below chart) Intervention Date/Notes  7/1   MT Manual Ankle AP mobs  Ankle distraction Gr5   TE* Bike 5'   TE Running gait assessment and retraining 15 minutes   TE* Ankle mobs BPB 20x ea   TE* Split squats 50# DBs 3 x8 ea   TE* Sgl HS curls on roller 3 x 10 ea   TE* Knee extension tempo 3 x 8    TA* KB  RDL SL 50# 3 x 10 lacy     15 minutes of Therapeutic Exercise (TE) to develop strength, endurance, ROM, and flexibility. (99179)  10 minutes of Manual therapy (MT) to improve pain and ROM. (55889)  00 minutes of Neuromuscular Re-Education (NMR)  to improve: Balance, Coordination, Kinesthetic, Sense, Proprioception, and Posture. (89344)  00 minutes of Therapeutic Activities (TA) to improve functional performance. (60319)  Unattended Electrical Stimulation (ES) for muscle performance and/or pain modulation. (13799)  Vasopneumatic Device Therapy () for management of swelling/edema. (80045)  BFR: Blood flow restriction applied during exercise  NP: Not Performed    * indicates that exercise was performed, not billed today since patient was not under direct one-on-one supervision      Patient Education and Home Exercises         Education provided:   Hip abductor loading progression  Adding specific exercises like ankle mobs, single leg squats, lunges with rotation before running to prime the movements    Written Home Exercises Provided: Patient instructed to cont prior HEP.  Exercises were reviewed and Sacha was able to demonstrate them prior to the end of the session.  Sacha demonstrated good  understanding of the education provided.     See EMR under Patient Instructions for exercises provided prior visit.    Assessment     7/1- Pt continues to demonstrate some running gait deficiencies which may contribute to continued knee pain. I recommend that he continues to work on increasing running darell and start working on a slight forward lean to offload the knee.    Sacha Is progressing well towards his goals.   Pt prognosis is Good.     Pt will continue to benefit from skilled outpatient physical therapy to address the deficits listed in the problem list box on initial evaluation, provide pt/family education and to maximize pt's level of independence in the home and community environment.     Pt's  spiritual, cultural and educational needs considered and pt agreeable to plan of care and goals.    Anticipated barriers to physical therapy: chronicity of condition     Goals:     Short Term Goals:  6 weeks Status  Date Met   PAIN: Pt will report worst pain of 2/10 in order to progress toward max functional ability and improve quality of life. [] Progressing  [x] Met  [] Not Met  3/18/24   FUNCTION: Patient will demonstrate improved function as indicated by a functional score improvement of at least 5 points on FOTO. [] Progressing  [x] Met  [] Not Met  3/18/24   MOBILITY: Patient will improve AROM to 50% of stated goals, listed in objective measures above, in order to progress towards independence with functional activities.  [] Progressing  [x] Met  [] Not Met  3/18/24   STRENGTH: Patient will improve strength to 50% of stated goals, listed in objective measures above, in order to progress towards independence with functional activities. [x] Progressing  [x] Met  [] Not Met  3/18/24   POSTURE: Patient will correct postural deviations in sitting and standing, to decrease pain and promote long term stability.  [] Progressing  [x] Met  [] Not Met  3/18/24   HEP: Patient will demonstrate independence with HEP in order to progress toward functional independence. [] Progressing  [x] Met  [] Not Met  3/18/24      Long Term Goals:  12 weeks Status Date Met   PAIN: Pt will report worst pain of 1/10 in order to progress toward max functional ability and improve quality of life [x] Progressing  [] Met  [] Not Met     FUNCTION: Patient will demonstrate improved function as indicated by a FOTO functional score improvement as listed in header. [x] Progressing  [] Met  [] Not Met     MOBILITY: Patient will improve AROM to stated goals, listed in objective measures above, in order to return to maximal functional potential and improve quality of life.  [x] Progressing  [x] Met  [] Not Met     STRENGTH: Patient will improve  strength to stated goals, listed in objective measures above, in order to improve functional independence and quality of life.  [x] Progressing  [] Met  [] Not Met     GAIT: Patient will demonstrate normalized gait mechanics with minimal compensation in order to return to PLOF. [x] Progressing  [x] Met  [] Not Met     Patient will return to normal ADL's, IADL's, community involvement, recreational activities, and work-related activities with less than or equal to 1/10 pain and maximal function.  [x] Progressing  [] Met  [] Not Met          Plan       Continue with abductor loading progression  Continued to address movement deficits      Jv Pedro PT,

## 2024-07-02 ENCOUNTER — TELEPHONE (OUTPATIENT)
Dept: PULMONOLOGY | Facility: CLINIC | Age: 44
End: 2024-07-02
Payer: OTHER GOVERNMENT

## 2024-07-03 ENCOUNTER — OFFICE VISIT (OUTPATIENT)
Dept: PRIMARY CARE CLINIC | Facility: CLINIC | Age: 44
End: 2024-07-03
Payer: OTHER GOVERNMENT

## 2024-07-03 ENCOUNTER — OFFICE VISIT (OUTPATIENT)
Dept: SLEEP MEDICINE | Facility: CLINIC | Age: 44
End: 2024-07-03
Payer: OTHER GOVERNMENT

## 2024-07-03 VITALS
HEART RATE: 77 BPM | SYSTOLIC BLOOD PRESSURE: 130 MMHG | OXYGEN SATURATION: 98 % | DIASTOLIC BLOOD PRESSURE: 78 MMHG | TEMPERATURE: 97 F | RESPIRATION RATE: 18 BRPM | WEIGHT: 188.19 LBS | HEIGHT: 71 IN | BODY MASS INDEX: 26.35 KG/M2

## 2024-07-03 VITALS
WEIGHT: 185.44 LBS | BODY MASS INDEX: 25.96 KG/M2 | HEART RATE: 81 BPM | RESPIRATION RATE: 16 BRPM | OXYGEN SATURATION: 97 % | HEIGHT: 71 IN

## 2024-07-03 DIAGNOSIS — K58.1 IRRITABLE BOWEL SYNDROME WITH CONSTIPATION: ICD-10-CM

## 2024-07-03 DIAGNOSIS — R51.9 CHRONIC NONINTRACTABLE HEADACHE, UNSPECIFIED HEADACHE TYPE: Primary | ICD-10-CM

## 2024-07-03 DIAGNOSIS — K29.50 CHRONIC GASTRITIS, PRESENCE OF BLEEDING UNSPECIFIED, UNSPECIFIED GASTRITIS TYPE: ICD-10-CM

## 2024-07-03 DIAGNOSIS — R20.2 NUMBNESS AND TINGLING IN BOTH HANDS: ICD-10-CM

## 2024-07-03 DIAGNOSIS — H93.13 TINNITUS OF BOTH EARS: ICD-10-CM

## 2024-07-03 DIAGNOSIS — G89.29 CHRONIC NONINTRACTABLE HEADACHE, UNSPECIFIED HEADACHE TYPE: Primary | ICD-10-CM

## 2024-07-03 DIAGNOSIS — G47.00 INSOMNIA, UNSPECIFIED TYPE: ICD-10-CM

## 2024-07-03 DIAGNOSIS — R20.0 NUMBNESS AND TINGLING IN BOTH HANDS: ICD-10-CM

## 2024-07-03 DIAGNOSIS — G89.29 CHRONIC BILATERAL LOW BACK PAIN WITHOUT SCIATICA: ICD-10-CM

## 2024-07-03 DIAGNOSIS — G47.00 INSOMNIA, UNSPECIFIED TYPE: Primary | ICD-10-CM

## 2024-07-03 DIAGNOSIS — M54.50 CHRONIC BILATERAL LOW BACK PAIN WITHOUT SCIATICA: ICD-10-CM

## 2024-07-03 DIAGNOSIS — K21.00 GASTROESOPHAGEAL REFLUX DISEASE WITH ESOPHAGITIS, UNSPECIFIED WHETHER HEMORRHAGE: ICD-10-CM

## 2024-07-03 PROBLEM — G47.30 SLEEP DISORDER BREATHING: Status: RESOLVED | Noted: 2024-06-17 | Resolved: 2024-07-03

## 2024-07-03 PROCEDURE — 99215 OFFICE O/P EST HI 40 MIN: CPT | Mod: S$PBB,,, | Performed by: FAMILY MEDICINE

## 2024-07-03 PROCEDURE — G2211 COMPLEX E/M VISIT ADD ON: HCPCS | Mod: S$PBB,,, | Performed by: FAMILY MEDICINE

## 2024-07-03 PROCEDURE — 99999 PR PBB SHADOW E&M-EST. PATIENT-LVL V: CPT | Mod: PBBFAC,,, | Performed by: FAMILY MEDICINE

## 2024-07-03 PROCEDURE — 99999 PR PBB SHADOW E&M-EST. PATIENT-LVL III: CPT | Mod: PBBFAC,,, | Performed by: NURSE PRACTITIONER

## 2024-07-03 PROCEDURE — 99213 OFFICE O/P EST LOW 20 MIN: CPT | Mod: PBBFAC | Performed by: NURSE PRACTITIONER

## 2024-07-03 PROCEDURE — 99215 OFFICE O/P EST HI 40 MIN: CPT | Mod: PBBFAC,27,PN | Performed by: FAMILY MEDICINE

## 2024-07-03 PROCEDURE — 99213 OFFICE O/P EST LOW 20 MIN: CPT | Mod: S$PBB,,, | Performed by: NURSE PRACTITIONER
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
## 2024-07-03 NOTE — PROGRESS NOTES
Subjective:      Patient ID: Sacha Fregoso is a 43 y.o. male.    Chief Complaint: Sleep Apnea    HPI  Patient presents today for evaluation of sleep.  Patient with snoring. Patient not having problems falling asleep, but wakes up frequently throughout the night. Difficulty falling back asleep.   Patient does not wake up feeling refreshed in the morning.  Patient with daytime hypersomnolence. Both of his parents have ALFONZO  Bedtime: 9:30-10PM  Wake time: 5AM  He had a sleep study  Melatonin extended release helps him fall back asleep faster.     STOP - BANG Questionnaire:      1. Snoring : Do you snore loudly ?    Yes     2. Tired : Do you often feel tired, fatigued, or sleepy during daytime?   Yes     3. Observed: Has anyone observed you stop breathing during your sleep?   No     4. Blood pressure : Do you have or are you being treated for high blood pressure?   No     5. BMI :BMI more than 35 kg/m2?   No     6. Age : Age over 50 yr old?   No     7. Neck circumference: Neck circumference greater than 40 cm?   No     8. Gender: Gender male?   Yes     High risk of ALFONZO: Yes 5 - 8  Intermediate risk of ALFONZO: Yes 3 - 4  Low risk of ALFONZO: Yes 0 - 2        References:   STOP Questionnaire   A Tool to Screen Patients for Obstructive Sleep Apnea: NOÉ Montano.R.C.P.C., Matt Baker M.B.B.S., Sita Ball M.D.,Siri Manzano, Ph.D., MICHELE Prado.B.B.S.,_ Braulio Jackson.,_ Regina Dolan M.D., NOÉ Huitron.R.C.P.C.; Anesthesiology 2008; 108:812-21 Copyright © 2008, the American Society of Anesthesiologists, Inc. [employer]        Home Questionnaire (validated ALFONZO screening questionnaire)    Yes -- Snoring/apnea    Yes -- Fatigue    Body mass index is Body mass index is 24.94 kg/m²..  (>25 is overweight, >30 is obese)    Blood Pressure = normal blood pressure  (PreHTN 120-139/80-89, Stg1 140-159/90-99, Stg2 >160/>100)  Home = two of three ALFONZO categories are  "positive (high risk is 2-3 positive categories)      Patient Active Problem List   Diagnosis    Personal history of colonic polyps    GERD (gastroesophageal reflux disease)    Colon polyps    IBS (irritable bowel syndrome)    FH: heart disease    FH: diabetes mellitus    History of exposure to toxins via inhalation    Patellofemoral pain syndrome of right knee    Primary osteoarthritis of right knee    Right hip pain     BP (P) 110/62   Pulse 81   Resp 16   Ht 5' 11" (1.803 m)   Wt 84.1 kg (185 lb 6.5 oz)   SpO2 97%   BMI 25.86 kg/m²   Body mass index is 25.86 kg/m².    Review of Systems   Constitutional:  Positive for fatigue.   Respiratory:  Positive for snoring.    Musculoskeletal:  Positive for arthralgias.   Psychiatric/Behavioral:  Positive for sleep disturbance.    All other systems reviewed and are negative.    Objective:      Physical Exam  Constitutional:       Appearance: Normal appearance.   HENT:      Head: Normocephalic and atraumatic.      Nose: Nose normal.      Mouth/Throat:      Pharynx: Oropharynx is clear.   Cardiovascular:      Rate and Rhythm: Normal rate and regular rhythm.   Pulmonary:      Effort: Pulmonary effort is normal.      Breath sounds: Normal breath sounds.   Abdominal:      Palpations: Abdomen is soft.      Tenderness: There is no abdominal tenderness.   Musculoskeletal:         General: Normal range of motion.      Cervical back: Normal range of motion and neck supple.   Skin:     General: Skin is warm and dry.   Neurological:      General: No focal deficit present.      Mental Status: He is alert and oriented to person, place, and time.   Psychiatric:         Mood and Affect: Mood normal.         Behavior: Behavior normal.       Personal Diagnostic Review      7/3/2024     8:51 AM   EPWORTH SLEEPINESS SCALE   Sitting and reading 0   Watching TV 1   Sitting, inactive in a public place (e.g. a theatre or a meeting) 0   As a passenger in a car for an hour without a break 1 "   Lying down to rest in the afternoon when circumstances permit 3   Sitting and talking to someone 0   Sitting quietly after a lunch without alcohol 0   In a car, while stopped for a few minutes in traffic 0   Total score 5          Assessment:       1. Insomnia, unspecified type        Outpatient Encounter Medications as of 7/3/2024   Medication Sig Dispense Refill    diclofenac (VOLTAREN) 75 MG EC tablet Take 1 tablet (75 mg total) by mouth 2 (two) times daily with meals. 60 tablet 0    famotidine (PEPCID) 20 MG tablet Take 1 tablet (20 mg total) by mouth 2 (two) times daily. 180 tablet 3     No facility-administered encounter medications on file as of 7/3/2024.     No orders of the defined types were placed in this encounter.    Plan:   [facility] free CBT-I digitial program for insomnia. https://www[place].[place].gov[place]insomnia/  PLM- very mild- not disruptive.     He has psychiatry appt in October for evaluation of PTSD.  Continue melatonin extended release and follow up in a few months    1. Insomnia, unspecified type                        Elizabeth LeJeune, ACNP, ANP

## 2024-07-03 NOTE — PATIENT INSTRUCTIONS
"Digital Cognitive Behavioral Therapy for Insomnia (CBT-I)    The cognitive part of CBT-I teaches you to recognize and change beliefs that affect your ability to sleep. This type of therapy can help you control or eliminate negative thoughts and worries that keep you awake.    The behavioral part of CBT-I helps you develop good sleep habits and avoid behaviors that keep you from sleeping well.    Insomnia symptoms are very common due to medical and mental health conditions including cancer, pain, depression/anxiety/bipolar disorder, endocrine or hormonal changes. It is important to address these health problems while working on CBT-I.     As you are aware, there  is no "magic bullet" or "magic pill" for poor sleep. Sleeping pills affect your sleep quality and depth of sleep. You may also have issues with rebound insomnia when trying to discontinue taking sleeping medications.     CBT-I is not a passive solution. The programs require sustained and significant effort in order for improvement to be achieved but the rewards are well worth it!    I would recommend starting off with the Calm malorie or the Aura malorie. If more interaction is needed, here are some recommendations:      www.freecbti.com   Free digital cognitive behavioral therapy for insomnia    www.Neurovance.EXPO   This malorie has a dedicated sleep  (an actual person via text) who is a trained behavioral health and wellness professional. $    wwwRoomClip   With this malorie you log in once a week with your virtual sleep expert (not actual person) and work on your plan. $50    https://Selleration.Readbug/products/go-to-sleep-online              Go[facility] To Sleep[facility]. $40    https[place]insomnia/   Free with [facility] benefits.    "

## 2024-07-05 ENCOUNTER — CLINICAL SUPPORT (OUTPATIENT)
Facility: HOSPITAL | Age: 44
End: 2024-07-05
Payer: OTHER GOVERNMENT

## 2024-07-05 DIAGNOSIS — M25.551 RIGHT HIP PAIN: ICD-10-CM

## 2024-07-05 DIAGNOSIS — M22.2X1 PATELLOFEMORAL PAIN SYNDROME OF RIGHT KNEE: Primary | ICD-10-CM

## 2024-07-05 DIAGNOSIS — M17.11 PRIMARY OSTEOARTHRITIS OF RIGHT KNEE: ICD-10-CM

## 2024-07-05 PROCEDURE — 97110 THERAPEUTIC EXERCISES: CPT | Mod: PN | Performed by: PHYSICAL THERAPIST

## 2024-07-05 PROCEDURE — 97140 MANUAL THERAPY 1/> REGIONS: CPT | Mod: PN | Performed by: PHYSICAL THERAPIST

## 2024-07-05 NOTE — PROGRESS NOTES
"  [facility]   Physical Therapy Treatment Note + Plan of Care     Name: Sacha Fregoso  Clinic Number: [id number]    Therapy Diagnosis:   Encounter Diagnoses   Name Primary?    Patellofemoral pain syndrome of right knee Yes    Primary osteoarthritis of right knee     Right hip pain        Physician: John Ferguson MD     Physician Orders: PT Eval and Treat   Medical Diagnosis from Referral: Right hip pain [M25.551], Primary osteoarthritis of right knee [M17.11], Patellofemoral pain syndrome of right knee [M22.2X1]   Evaluation Date: 12/20/2023  Authorization Period Expiration: 4/12/24  Plan of Care Expiration: 8/10/24  Progress Note Due: 7/10/24  Visit # / Visits authorized: 19/30    Time In: 0814 pm   Time Out: 0920 pm   Total Billable Time: 25 minutes  Billing reflects 1:1 direct supervision    MD follow-up:    Precautions: Standard    FOTO:  Goal: 72%  -Intake: 59%  -Status: 56%  -Discharge: incomplete    Subjective     Pt reports: 7/5- he is feeling a little bit stiff today. Feeling stiff in the knees in the lower back, maybe from riding his bike more.  He was compliant with home exercise program.  Response to previous treatment: tolerated treatment well.   Functional change: improved ability to participate in high level ADL's in a limited fashion.     Pain: 7/10 at worst, 2/10 currently  Location: right knee     Objective      Objective Measures updated at progress report unless specified otherwise.    Primary Impairments:   Thigh strength  Dominant hamstring,   knee valgus,   knee lateral glide syndrome    Treatment     Sacha received the treatments listed below:      Code  (see below chart) Intervention Date/Notes  7/5   MT Manual Knee gapping  Lumbar gapping Gr5   TE* Bike 5'   TE Running gait retraining 10 minutes   TA * Decline SL squat tempo 24" box 20# KB 3 x 8   TA* RDL Kick stand 95# 3 x 10 lacy   TE* Sgl HS curls on roller 3 x 10 ea   TE* Leg press  3 x 8 ea "   TE* Knee extension tempo 3 x 8, 85#   NR* Bridge elevated sgl 50# sandbag 3 x 8   NR* Side plank hip abduction 3 x 10 - lacy     10 minutes of Therapeutic Exercise (TE) to develop strength, endurance, ROM, and flexibility. (82741)  15 minutes of Manual therapy (MT) to improve pain and ROM. (08466)  00 minutes of Neuromuscular Re-Education (NMR)  to improve: Balance, Coordination, Kinesthetic, Sense, Proprioception, and Posture. (25098)  00 minutes of Therapeutic Activities (TA) to improve functional performance. (41719)  Unattended Electrical Stimulation (ES) for muscle performance and/or pain modulation. (39034)  Vasopneumatic Device Therapy () for management of swelling/edema. (65183)  BFR: Blood flow restriction applied during exercise  NP: Not Performed    * indicates that exercise was performed, not billed today since patient was not under direct one-on-one supervision      Patient Education and Home Exercises         Education provided:   Hip abductor loading progression  Adding specific exercises like ankle mobs, single leg squats, lunges with rotation before running to prime the movements    Written Home Exercises Provided: Patient instructed to cont prior HEP.  Exercises were reviewed and Sacha was able to demonstrate them prior to the end of the session.  Sacha demonstrated good  understanding of the education provided.     See EMR under Patient Instructions for exercises provided prior visit.    Assessment     7/5- Improving running mechanics with running gait retraining today. Good tolerance to high load resistance exercise. We will continue to progress to maximize strength.    Sacha Is progressing well towards his goals.   Pt prognosis is Good.     Pt will continue to benefit from skilled outpatient physical therapy to address the deficits listed in the problem list box on initial evaluation, provide pt/family education and to maximize pt's level of independence in the home and  community environment.     Pt's spiritual, cultural and educational needs considered and pt agreeable to plan of care and goals.    Anticipated barriers to physical therapy: chronicity of condition     Goals:     Short Term Goals:  6 weeks Status  Date Met   PAIN: Pt will report worst pain of 2/10 in order to progress toward max functional ability and improve quality of life. [] Progressing  [x] Met  [] Not Met  3/18/24   FUNCTION: Patient will demonstrate improved function as indicated by a functional score improvement of at least 5 points on FOTO. [] Progressing  [x] Met  [] Not Met  3/18/24   MOBILITY: Patient will improve AROM to 50% of stated goals, listed in objective measures above, in order to progress towards independence with functional activities.  [] Progressing  [x] Met  [] Not Met  3/18/24   STRENGTH: Patient will improve strength to 50% of stated goals, listed in objective measures above, in order to progress towards independence with functional activities. [x] Progressing  [x] Met  [] Not Met  3/18/24   POSTURE: Patient will correct postural deviations in sitting and standing, to decrease pain and promote long term stability.  [] Progressing  [x] Met  [] Not Met  3/18/24   HEP: Patient will demonstrate independence with HEP in order to progress toward functional independence. [] Progressing  [x] Met  [] Not Met  3/18/24      Long Term Goals:  12 weeks Status Date Met   PAIN: Pt will report worst pain of 1/10 in order to progress toward max functional ability and improve quality of life [x] Progressing  [] Met  [] Not Met     FUNCTION: Patient will demonstrate improved function as indicated by a FOTO functional score improvement as listed in header. [x] Progressing  [] Met  [] Not Met     MOBILITY: Patient will improve AROM to stated goals, listed in objective measures above, in order to return to maximal functional potential and improve quality of life.  [x] Progressing  [x] Met  [] Not Met      STRENGTH: Patient will improve strength to stated goals, listed in objective measures above, in order to improve functional independence and quality of life.  [x] Progressing  [] Met  [] Not Met     GAIT: Patient will demonstrate normalized gait mechanics with minimal compensation in order to return to PLOF. [x] Progressing  [x] Met  [] Not Met     Patient will return to normal ADL's, IADL's, community involvement, recreational activities, and work-related activities with less than or equal to 1/10 pain and maximal function.  [x] Progressing  [] Met  [] Not Met          Plan       Continue with abductor loading progression  Continued to address movement deficits      Jv Pedro, PT,

## 2024-07-08 ENCOUNTER — CLINICAL SUPPORT (OUTPATIENT)
Facility: HOSPITAL | Age: 44
End: 2024-07-08
Payer: OTHER GOVERNMENT

## 2024-07-08 ENCOUNTER — HOSPITAL ENCOUNTER (OUTPATIENT)
Dept: PREADMISSION TESTING | Facility: HOSPITAL | Age: 44
Discharge: HOME | End: 2024-07-08
Attending: INTERNAL MEDICINE
Payer: OTHER GOVERNMENT

## 2024-07-08 DIAGNOSIS — K21.00 GASTROESOPHAGEAL REFLUX DISEASE WITH ESOPHAGITIS, UNSPECIFIED WHETHER HEMORRHAGE: Primary | ICD-10-CM

## 2024-07-08 DIAGNOSIS — M25.551 RIGHT HIP PAIN: ICD-10-CM

## 2024-07-08 DIAGNOSIS — M22.2X1 PATELLOFEMORAL PAIN SYNDROME OF RIGHT KNEE: Primary | ICD-10-CM

## 2024-07-08 DIAGNOSIS — M17.11 PRIMARY OSTEOARTHRITIS OF RIGHT KNEE: ICD-10-CM

## 2024-07-08 DIAGNOSIS — K29.50 CHRONIC GASTRITIS, PRESENCE OF BLEEDING UNSPECIFIED, UNSPECIFIED GASTRITIS TYPE: ICD-10-CM

## 2024-07-08 PROCEDURE — 97140 MANUAL THERAPY 1/> REGIONS: CPT | Mod: PN | Performed by: PHYSICAL THERAPIST

## 2024-07-08 NOTE — PROGRESS NOTES
"  [facility]   Physical Therapy Treatment Note + Plan of Care     Name: Sacha Fregoso  Clinic Number: [id number]    Therapy Diagnosis:   Encounter Diagnoses   Name Primary?    Patellofemoral pain syndrome of right knee Yes    Primary osteoarthritis of right knee     Right hip pain        Physician: John Ferguson MD     Physician Orders: PT Eval and Treat   Medical Diagnosis from Referral: Right hip pain [M25.551], Primary osteoarthritis of right knee [M17.11], Patellofemoral pain syndrome of right knee [M22.2X1]   Evaluation Date: 12/20/2023  Authorization Period Expiration: 4/12/24  Plan of Care Expiration: 8/10/24  Progress Note Due: 7/10/24  Visit # / Visits authorized: 20/30    Time In: 0750 pm   Time Out: 0905 pm   Total Billable Time: 15 minutes  Billing reflects 1:1 direct supervision    MD follow-up:    Precautions: Standard    FOTO:  Goal: 72%  -Intake: 59%  -Status: 56%  -Discharge: incomplete    Subjective     Pt reports: 7/8- Feeling about the same today, getting a back x-ray soon.  He was compliant with home exercise program.  Response to previous treatment: tolerated treatment well.   Functional change: improved ability to participate in high level ADL's in a limited fashion.     Pain: 7/10 at worst, 2/10 currently  Location: right knee     Objective      Objective Measures updated at progress report unless specified otherwise.    Primary Impairments:   Thigh strength  Dominant hamstring,   knee valgus,   knee lateral glide syndrome    Treatment     Sacha received the treatments listed below:      Code  (see below chart) Intervention Date/Notes  7/8   MT Manual Knee gapping  Lumbar gapping Gr5  Ankle DF manip    TE* Dynamic stretch Lunge  HS  Quad   TE* Ankle mobs BPB 20x ea   TE* Running gait retraining 3 rounds:  Stride outs  Sled run    TE* Decline SL squat temp 24" box 20# KB 3 x 8   TA* RDL Kick stand 100# 3 x 10 lacy   TE* Sgl HS curls on roller 3 x 10 " ea   TE* Leg press  3 x 8 ea, 225#   TE* Knee extension tempo 3 x 8, 85#   NR* Bridge elevated sgl 50# sandbag 3 x 8   NR* Side plank hip abduction 3 x 10 - lacy     00 minutes of Therapeutic Exercise (TE) to develop strength, endurance, ROM, and flexibility. (84393)  15 minutes of Manual therapy (MT) to improve pain and ROM. (77504)  00 minutes of Neuromuscular Re-Education (NMR)  to improve: Balance, Coordination, Kinesthetic, Sense, Proprioception, and Posture. (40169)  00 minutes of Therapeutic Activities (TA) to improve functional performance. (37006)  Unattended Electrical Stimulation (ES) for muscle performance and/or pain modulation. (24984)  Vasopneumatic Device Therapy () for management of swelling/edema. (06175)  BFR: Blood flow restriction applied during exercise  NP: Not Performed    * indicates that exercise was performed, not billed today since patient was not under direct one-on-one supervision      Patient Education and Home Exercises         Education provided:   Hip abductor loading progression  Adding specific exercises like ankle mobs, single leg squats, lunges with rotation before running to prime the movements    Written Home Exercises Provided: Patient instructed to cont prior HEP.  Exercises were reviewed and Sacha was able to demonstrate them prior to the end of the session.  Sacha demonstrated good  understanding of the education provided.     See EMR under Patient Instructions for exercises provided prior visit.    Assessment     7/8- Improving darell with running drills. He continues to have intermittent knee symptoms, but is doing very well with progressive loading.    Sacha Is progressing well towards his goals.   Pt prognosis is Good.     Pt will continue to benefit from skilled outpatient physical therapy to address the deficits listed in the problem list box on initial evaluation, provide pt/family education and to maximize pt's level of independence in the home and  community environment.     Pt's spiritual, cultural and educational needs considered and pt agreeable to plan of care and goals.    Anticipated barriers to physical therapy: chronicity of condition     Goals:     Short Term Goals:  6 weeks Status  Date Met   PAIN: Pt will report worst pain of 2/10 in order to progress toward max functional ability and improve quality of life. [] Progressing  [x] Met  [] Not Met  3/18/24   FUNCTION: Patient will demonstrate improved function as indicated by a functional score improvement of at least 5 points on FOTO. [] Progressing  [x] Met  [] Not Met  3/18/24   MOBILITY: Patient will improve AROM to 50% of stated goals, listed in objective measures above, in order to progress towards independence with functional activities.  [] Progressing  [x] Met  [] Not Met  3/18/24   STRENGTH: Patient will improve strength to 50% of stated goals, listed in objective measures above, in order to progress towards independence with functional activities. [x] Progressing  [x] Met  [] Not Met  3/18/24   POSTURE: Patient will correct postural deviations in sitting and standing, to decrease pain and promote long term stability.  [] Progressing  [x] Met  [] Not Met  3/18/24   HEP: Patient will demonstrate independence with HEP in order to progress toward functional independence. [] Progressing  [x] Met  [] Not Met  3/18/24      Long Term Goals:  12 weeks Status Date Met   PAIN: Pt will report worst pain of 1/10 in order to progress toward max functional ability and improve quality of life [x] Progressing  [] Met  [] Not Met     FUNCTION: Patient will demonstrate improved function as indicated by a FOTO functional score improvement as listed in header. [x] Progressing  [] Met  [] Not Met     MOBILITY: Patient will improve AROM to stated goals, listed in objective measures above, in order to return to maximal functional potential and improve quality of life.  [x] Progressing  [x] Met  [] Not Met      STRENGTH: Patient will improve strength to stated goals, listed in objective measures above, in order to improve functional independence and quality of life.  [x] Progressing  [] Met  [] Not Met     GAIT: Patient will demonstrate normalized gait mechanics with minimal compensation in order to return to PLOF. [x] Progressing  [x] Met  [] Not Met     Patient will return to normal ADL's, IADL's, community involvement, recreational activities, and work-related activities with less than or equal to 1/10 pain and maximal function.  [x] Progressing  [] Met  [] Not Met          Plan       Continue with abductor loading progression  Continued to address movement deficits      Jv Pedro, PT,

## 2024-07-10 ENCOUNTER — PROCEDURE VISIT (OUTPATIENT)
Dept: NEUROLOGY | Facility: CLINIC | Age: 44
End: 2024-07-10
Payer: OTHER GOVERNMENT

## 2024-07-10 DIAGNOSIS — R20.0 NUMBNESS AND TINGLING IN BOTH HANDS: ICD-10-CM

## 2024-07-10 DIAGNOSIS — R20.2 NUMBNESS AND TINGLING IN BOTH HANDS: ICD-10-CM

## 2024-07-10 PROCEDURE — 95912 NRV CNDJ TEST 11-12 STUDIES: CPT | Mod: PBBFAC | Performed by: PSYCHIATRY & NEUROLOGY

## 2024-07-12 ENCOUNTER — CLINICAL SUPPORT (OUTPATIENT)
Facility: HOSPITAL | Age: 44
End: 2024-07-12
Payer: OTHER GOVERNMENT

## 2024-07-12 DIAGNOSIS — M25.551 RIGHT HIP PAIN: ICD-10-CM

## 2024-07-12 DIAGNOSIS — M22.2X1 PATELLOFEMORAL PAIN SYNDROME OF RIGHT KNEE: Primary | ICD-10-CM

## 2024-07-12 DIAGNOSIS — M17.11 PRIMARY OSTEOARTHRITIS OF RIGHT KNEE: ICD-10-CM

## 2024-07-12 PROCEDURE — 97112 NEUROMUSCULAR REEDUCATION: CPT | Mod: PN | Performed by: PHYSICAL THERAPIST

## 2024-07-12 PROCEDURE — 97140 MANUAL THERAPY 1/> REGIONS: CPT | Mod: PN | Performed by: PHYSICAL THERAPIST

## 2024-07-12 NOTE — PROGRESS NOTES
[facility]   Physical Therapy Treatment Note + Plan of Care     Name: Sacha Fregoso  Clinic Number: [id number]    Therapy Diagnosis:   Encounter Diagnoses   Name Primary?    Patellofemoral pain syndrome of right knee Yes    Primary osteoarthritis of right knee     Right hip pain        Physician: John Ferguson MD     Physician Orders: PT Eval and Treat   Medical Diagnosis from Referral: Right hip pain [M25.551], Primary osteoarthritis of right knee [M17.11], Patellofemoral pain syndrome of right knee [M22.2X1]   Evaluation Date: 12/20/2023  Authorization Period Expiration: 4/12/24  Plan of Care Expiration: 8/10/24  Progress Note Due: 7/10/24  Visit # / Visits authorized: 21/30    Time In: 0750 pm   Time Out: 0905 pm   Total Billable Time: 25 minutes  Billing reflects 1:1 direct supervision    MD follow-up:    Precautions: Standard    FOTO:  Goal: 72%  -Intake: 59%  -Status: 56%  -Discharge: incomplete    Subjective     Pt reports: 7/12 - Feeling the normal degree of stiffness. Still having some discomfort in his back.    He was compliant with home exercise program.  Response to previous treatment: tolerated treatment well.   Functional change: improved ability to participate in high level ADL's in a limited fashion.     Pain: 7/10 at worst, 2/10 currently  Location: right knee     Objective      Objective Measures updated at progress report unless specified otherwise.    Primary Impairments:   Thigh strength  Dominant hamstring,   knee valgus,   knee lateral glide syndrome    Treatment     Sacha received the treatments listed below:      Code   (see below chart)  Intervention  Date/Notes   7/12    MT  Manual  Knee gapping   Lumbar gapping Gr5    TE * Bike  5'     TE* Dynamic stretch  Lunge   HS   Quad    TE * Ankle mobs  BPB 20x ea    NR  Abdominal cuff biofeedback  Bracing x 5   Bent knee fallout x 10   March x 10    NR  Glute isolation  15x ea    TA * Decline SL squat  "temp  24" box 20# KB 3 x 8    TA * RDL  Kick stand    100# 1 x 10,    115# 2 x 10 lacy    TE * Sgl HS curls on roller  3 x 10 ea    TE * Leg press   225# 1 x 8 ea,   245# 3 x 8     TE * Knee extension tempo  1 x 8, 85#   2 x 8, 95#    NR * Bridge elevated sgl  50# sandbag 3 x 8    NR * Side plank hip abduction  3 x 10 - lacy      00 minutes of Therapeutic Exercise (TE) to develop strength, endurance, ROM, and flexibility. (66986)  15 minutes of Manual therapy (MT) to improve pain and ROM. (57662)  10 minutes of Neuromuscular Re-Education (NMR)  to improve: Balance, Coordination, Kinesthetic, Sense, Proprioception, and Posture. (88687)  00 minutes of Therapeutic Activities (TA) to improve functional performance. (75244)  Unattended Electrical Stimulation (ES) for muscle performance and/or pain modulation. (75837)  Vasopneumatic Device Therapy () for management of swelling/edema. (30242)  BFR: Blood flow restriction applied during exercise  NP: Not Performed    * indicates that exercise was performed, not billed today since patient was not under direct one-on-one supervision      Patient Education and Home Exercises         Education provided:   Hip abductor loading progression  Adding specific exercises like ankle mobs, single leg squats, lunges with rotation before running to prime the movements    Written Home Exercises Provided: Patient instructed to cont prior HEP.  Exercises were reviewed and Sacha was able to demonstrate them prior to the end of the session.  Sacha demonstrated good  understanding of the education provided.     See EMR under Patient Instructions for exercises provided prior visit.    Assessment     7/12 - The patient was noted to have some lumbar stiffness and movement dysfunction at the beginning of treatment. Improve mobility and range of motion with manual therapy. He continues to demonstrate lumbo pelvic stability deficits. Guitars all strength exercises today.      Sacha Is " progressing well towards his goals.   Pt prognosis is Good.     Pt will continue to benefit from skilled outpatient physical therapy to address the deficits listed in the problem list box on initial evaluation, provide pt/family education and to maximize pt's level of independence in the home and community environment.     Pt's spiritual, cultural and educational needs considered and pt agreeable to plan of care and goals.    Anticipated barriers to physical therapy: chronicity of condition     Goals:     Short Term Goals:  6 weeks Status  Date Met   PAIN: Pt will report worst pain of 2/10 in order to progress toward max functional ability and improve quality of life. [] Progressing  [x] Met  [] Not Met  3/18/24   FUNCTION: Patient will demonstrate improved function as indicated by a functional score improvement of at least 5 points on FOTO. [] Progressing  [x] Met  [] Not Met  3/18/24   MOBILITY: Patient will improve AROM to 50% of stated goals, listed in objective measures above, in order to progress towards independence with functional activities.  [] Progressing  [x] Met  [] Not Met  3/18/24   STRENGTH: Patient will improve strength to 50% of stated goals, listed in objective measures above, in order to progress towards independence with functional activities. [x] Progressing  [x] Met  [] Not Met  3/18/24   POSTURE: Patient will correct postural deviations in sitting and standing, to decrease pain and promote long term stability.  [] Progressing  [x] Met  [] Not Met  3/18/24   HEP: Patient will demonstrate independence with HEP in order to progress toward functional independence. [] Progressing  [x] Met  [] Not Met  3/18/24      Long Term Goals:  12 weeks Status Date Met   PAIN: Pt will report worst pain of 1/10 in order to progress toward max functional ability and improve quality of life [x] Progressing  [] Met  [] Not Met     FUNCTION: Patient will demonstrate improved function as indicated by a FOTO  functional score improvement as listed in header. [x] Progressing  [] Met  [] Not Met     MOBILITY: Patient will improve AROM to stated goals, listed in objective measures above, in order to return to maximal functional potential and improve quality of life.  [x] Progressing  [x] Met  [] Not Met     STRENGTH: Patient will improve strength to stated goals, listed in objective measures above, in order to improve functional independence and quality of life.  [x] Progressing  [] Met  [] Not Met     GAIT: Patient will demonstrate normalized gait mechanics with minimal compensation in order to return to PLOF. [x] Progressing  [x] Met  [] Not Met     Patient will return to normal ADL's, IADL's, community involvement, recreational activities, and work-related activities with less than or equal to 1/10 pain and maximal function.  [x] Progressing  [] Met  [] Not Met          Plan       Continue with abductor loading progression  Continued to address movement deficits      Jv Pedro, PT,

## 2024-07-19 ENCOUNTER — CLINICAL SUPPORT (OUTPATIENT)
Facility: HOSPITAL | Age: 44
End: 2024-07-19
Payer: OTHER GOVERNMENT

## 2024-07-19 DIAGNOSIS — M17.11 PRIMARY OSTEOARTHRITIS OF RIGHT KNEE: ICD-10-CM

## 2024-07-19 DIAGNOSIS — M25.551 RIGHT HIP PAIN: ICD-10-CM

## 2024-07-19 DIAGNOSIS — M22.2X1 PATELLOFEMORAL PAIN SYNDROME OF RIGHT KNEE: Primary | ICD-10-CM

## 2024-07-19 PROCEDURE — 97110 THERAPEUTIC EXERCISES: CPT | Mod: PN | Performed by: PHYSICAL THERAPIST

## 2024-07-19 PROCEDURE — 97140 MANUAL THERAPY 1/> REGIONS: CPT | Mod: PN | Performed by: PHYSICAL THERAPIST

## 2024-07-19 PROCEDURE — 97530 THERAPEUTIC ACTIVITIES: CPT | Mod: PN | Performed by: PHYSICAL THERAPIST

## 2024-07-19 NOTE — PROGRESS NOTES
[facility]   Physical Therapy Treatment Note + Plan of Care     Name: Sacha Fregoso  Clinic Number: [id number]    Therapy Diagnosis:   Encounter Diagnoses   Name Primary?    Patellofemoral pain syndrome of right knee Yes    Primary osteoarthritis of right knee     Right hip pain          Physician: John Ferguson MD     Physician Orders: PT Eval and Treat   Medical Diagnosis from Referral: Right hip pain [M25.551], Primary osteoarthritis of right knee [M17.11], Patellofemoral pain syndrome of right knee [M22.2X1]   Evaluation Date: 12/20/2023  Authorization Period Expiration: 4/12/24  Plan of Care Expiration: 8/10/24  Progress Note Due: 7/10/24  Visit # / Visits authorized: 22/30    Time In: 0755 AM   Time Out:  9:15 AM   Total Billable Time: 40 minutes  Billing reflects 1:1 direct supervision    MD follow-up:    Precautions: Standard    FOTO:  Goal: 72%  -Intake: 59%  -Status: 56%  -Discharge: incomplete    Subjective     Pt reports: 7/19- Patient feels knee stiffness but overall a decrease in knee pain. Patient was able to run 4 miles yesterday (40 mins) and felt knee discomfort in the last part of his run but it decreased after a couple hours.     He was compliant with home exercise program.  Response to previous treatment: tolerated treatment well.   Functional change: improved ability to participate in high level ADL's in a limited fashion.     Pain: 7/10 at worst, 2/10 currently  Location: right knee     Objective      Objective Measures updated at progress report unless specified otherwise.    Primary Impairments:   Thigh strength  Dominant hamstring,   knee valgus,   knee lateral glide syndrome        Treatment     Sacha received the treatments listed below:    Code  (see below chart) Intervention Date/Notes  7/18 Date/Notes  7/12 Date/Notes  7/8   MT Manual Knee gapping  Lumbar gapping Gr5 Knee gapping  Lumbar gapping Gr5 Knee gapping  Lumbar gapping Gr5   TE  "Bike 5' 5'       Dynamic stretch Lunge  HS  Quad Lunge  HS  Quad Lunge  HS  Quad   TE Ankle mobs BPB 20x ea BPB 20x ea BPB 20x ea   NR Abdominal cuff biofeedback Bracing x 5  Bent knee fallout x 10  March x 10 Bracing x 5  Bent knee fallout x 10  March x 10     NR Side plank hip abduction 3 x 10 - lacy 3 x 10 - lacy 3 x 10 - lacy   TA Decline SL squat temp 24" box 20# KB 3 x 8 24" box 20# KB 3 x 8 24" box 20# KB 3 x 8   TA* RDL Kick stand   100# 1 x 10,   115# 2 x 10 lacy Kick stand   100# 1 x 10,   115# 2 x 10 lacy Kick stand 100# 3 x 10 lacy   TE* Sgl HS curls on roller 3 x 10 ea 3 x 10 ea 3 x 10 ea   TE* Leg press  225# 1 x 8 ea,  245# 3 x 8  225# 1 x 8 ea,  245# 3 x 8  3 x 8 ea, 225#   TE* Knee extension tempo 1 x 8, 85#  2 x 8, 95# 1 x 8, 85#  2 x 8, 95# 3 x 8, 85#   NR* Bridge elevated sgl 50# sandbag 3 x 8 50# sandbag 3 x 8 50# sandbag 3 x 8   NR Serratus wall slide with overhead reach         NR Face pull with scap retraction           KB press()                                NR Lateral step downs with paper         TE Running gait retraining     3 rounds:  Stride outs  Sled run   NR Lateral band walk         TA Decline step down anterior         TE Leg press           Plyometrics             10 minutes of Therapeutic Exercise (TE) to develop strength, endurance, ROM, and flexibility. (02290)  15 minutes of Manual therapy (MT) to improve pain and ROM. (81077)  05 minutes of Neuromuscular Re-Education (NMR)  to improve: Balance, Coordination, Kinesthetic, Sense, Proprioception, and Posture. (42139)  10 minutes of Therapeutic Activities (TA) to improve functional performance. (34276)  Unattended Electrical Stimulation (ES) for muscle performance and/or pain modulation. (67227)  Vasopneumatic Device Therapy () for management of swelling/edema. (12805)  BFR: Blood flow restriction applied during exercise  NP: Not Performed    * indicates that exercise was performed, not billed today since patient was not " under direct one-on-one supervision      Patient Education and Home Exercises         Education provided:   Hip abductor loading progression  Adding specific exercises like ankle mobs, single leg squats, lunges with rotation before running to prime the movements    Written Home Exercises Provided: Patient instructed to cont prior HEP.  Exercises were reviewed and Sacha was able to demonstrate them prior to the end of the session.  Sacha demonstrated good  understanding of the education provided.     See EMR under Patient Instructions for exercises provided prior visit.    Assessment     7/19- Pt felt stiff in his lumbar region which was improved through manual therapy. He continues to have lumbo pelvic stability deficits. Knee ROM and strengthening are improving. Continue to progress hip and knee musculature strengthening.       Sacha Is progressing well towards his goals.   Pt prognosis is Good.     Pt will continue to benefit from skilled outpatient physical therapy to address the deficits listed in the problem list box on initial evaluation, provide pt/family education and to maximize pt's level of independence in the home and community environment.     Pt's spiritual, cultural and educational needs considered and pt agreeable to plan of care and goals.    Anticipated barriers to physical therapy: chronicity of condition     Goals:     Short Term Goals:  6 weeks Status  Date Met   PAIN: Pt will report worst pain of 2/10 in order to progress toward max functional ability and improve quality of life. [] Progressing  [x] Met  [] Not Met  3/18/24   FUNCTION: Patient will demonstrate improved function as indicated by a functional score improvement of at least 5 points on FOTO. [] Progressing  [x] Met  [] Not Met  3/18/24   MOBILITY: Patient will improve AROM to 50% of stated goals, listed in objective measures above, in order to progress towards independence with functional activities.  []  Progressing  [x] Met  [] Not Met  3/18/24   STRENGTH: Patient will improve strength to 50% of stated goals, listed in objective measures above, in order to progress towards independence with functional activities. [x] Progressing  [x] Met  [] Not Met  3/18/24   POSTURE: Patient will correct postural deviations in sitting and standing, to decrease pain and promote long term stability.  [] Progressing  [x] Met  [] Not Met  3/18/24   HEP: Patient will demonstrate independence with HEP in order to progress toward functional independence. [] Progressing  [x] Met  [] Not Met  3/18/24      Long Term Goals:  12 weeks Status Date Met   PAIN: Pt will report worst pain of 1/10 in order to progress toward max functional ability and improve quality of life [x] Progressing  [] Met  [] Not Met     FUNCTION: Patient will demonstrate improved function as indicated by a FOTO functional score improvement as listed in header. [x] Progressing  [] Met  [] Not Met     MOBILITY: Patient will improve AROM to stated goals, listed in objective measures above, in order to return to maximal functional potential and improve quality of life.  [x] Progressing  [x] Met  [] Not Met     STRENGTH: Patient will improve strength to stated goals, listed in objective measures above, in order to improve functional independence and quality of life.  [x] Progressing  [] Met  [] Not Met     GAIT: Patient will demonstrate normalized gait mechanics with minimal compensation in order to return to PLOF. [x] Progressing  [x] Met  [] Not Met     Patient will return to normal ADL's, IADL's, community involvement, recreational activities, and work-related activities with less than or equal to 1/10 pain and maximal function.  [x] Progressing  [] Met  [] Not Met          Plan       Continue with abductor loading progression  Continued to address movement deficits      Jv Pedro, PT,

## 2024-07-22 ENCOUNTER — CLINICAL SUPPORT (OUTPATIENT)
Facility: HOSPITAL | Age: 44
End: 2024-07-22
Payer: OTHER GOVERNMENT

## 2024-07-22 DIAGNOSIS — M17.11 PRIMARY OSTEOARTHRITIS OF RIGHT KNEE: ICD-10-CM

## 2024-07-22 DIAGNOSIS — M25.551 RIGHT HIP PAIN: ICD-10-CM

## 2024-07-22 DIAGNOSIS — M22.2X1 PATELLOFEMORAL PAIN SYNDROME OF RIGHT KNEE: Primary | ICD-10-CM

## 2024-07-22 PROBLEM — R20.0 NUMBNESS AND TINGLING IN BOTH HANDS: Status: ACTIVE | Noted: 2024-07-22

## 2024-07-22 PROBLEM — R20.2 NUMBNESS AND TINGLING IN BOTH HANDS: Status: ACTIVE | Noted: 2024-07-22

## 2024-07-22 PROCEDURE — 97110 THERAPEUTIC EXERCISES: CPT | Mod: PN | Performed by: PHYSICAL THERAPIST

## 2024-07-22 NOTE — PROCEDURES
[facility]   Geetha Hickman  Department of Neurology  [address] [place] [address] [place]  [address]  Phone [phone]     Fax  [phone]        Full Name: Sacha Fregoso Gender: Male  Patient ID: [id number] YOB: 1980      Visit Date: 7/10/2024 8:05 AM  Age: 43 Years  Examining Physician: Karolina Foss M.D.  Referring Physician: Zuri Knowles MD  Technician: URSULA Valenzuela  History: CTS workup.  C/O: Numbness and tingling in both hands.  PMHX: GERD, PTSD, headache, IBS, insomnia.        SUMMARY     Nerve conduction studies were performed in the right and left upper extremities. The right median motor study recording the abductor pollicis brevis showed a normal amplitude, normal distal latency and normal conduction velocity. The right ulnar motor study recording the abductor digiti minimi showed a normal amplitude, normal distal latency and normal conduction velocity. No conduction block or focal slowing was present across the elbow.     The right median sensory response recording digit two showed a normal amplitude, latency and conduction velocity. The right ulnar sensory response recording digit five showed a normal amplitude, latency and conduction velocity. The right radial sensory response recording over the extensor snuff box showed a normal amplitude, latency and conduction velocity.     The left median motor study recording the abductor pollicis brevis showed a normal amplitude, normal distal latency and normal conduction velocity. The left ulnar motor study recording the abductor digiti minimi showed a normal amplitude, normal distal latency and normal conduction velocity. No conduction block or focal slowing was present across the elbow.       The left median sensory response recording digit two showed a normal amplitude, latency and conduction velocity. The left ulnar sensory response recording digit five showed a normal amplitude, latency and conduction  velocity. The left radial sensory response recording over the extensor snuff box showed a normal amplitude, latency and conduction velocity.     As routine median motor and sensory studies have a false negative rate of 25%, additional internal comparison studies were done to assess for possible median neuropathy at the wrist. These internal comparison studies (median vs. ulnar palmar mixed; median vs. ulnar sensory recording digit four; median vs. ulnar motor studies to the second lumbrical / interosseous; median vs. radial sensory studies recording digit one; median segmental sensory studies comparing the wrist-palm and palm-digit velocities) increase the electrodiagnostic sensitivity rate to 95%. However, due to statistical issues with multiple tests, it is required that at least two studies are abnormal to reduce the false positive rate to acceptable levels. Right median-ulnar mixed palmar latencies showed a normal median latency compared to the ulnar. Left median-ulnar mixed palmar latencies showed a normal median latency compared to the ulnar.           IMPRESSION       This is a normal study.     There is no electrophysiologic evidence of median mononeuropathy across the wrist (carpal tunnel syndrome) on either side. In addition, there is no electrophysiologic evidence of brachial plexopathy or other entrapment mononeuropathy in either upper extremity.    ----------------------------------------            Karolina Foss M.D., F.A.A.N.      Diplomate, American Board of Psychiatry and Neurology  Diplomate, American Board of Clinical Neurophysiology  Fellow, American Academy of Neurology         SENSORY NCS      Nerve / Sites Rec. Site Peak NP Amp PP Amp Dist Erwin d Lat.2     ms µV µV cm m/s ms   L Median - Dig II      Wrist II 3.12 17.3 24.1 13 50.3 3.12      Ref.  3.40  20.0  48.0    L Median - Ulnar - Palmar      Median Wrist 2.06 36.4 39.8 8 54.1 2.06      Ulnar Wrist 2.40 16.1 15.1 8 349.1 0.33   L Ulnar - Dig V       Wrist Dig V 3.21 12.2 9.1 11 43.6 3.21      Ref.  3.10  12.0  48.0    L Radial - Snuff box      Forearm Snuff box 2.67 41.2 66.5 10 49.5 2.67      Ref.  2.70 18.0       R Median - Dig II      Wrist II 3.23 19.8 27.0 13 49.9 3.23      Ref.  3.40  20.0  48.0    R Median - Ulnar - Palmar      Median Wrist 2.04 55.6 81.3 8 52.6 2.04      Ulnar Wrist 2.35 15.8 13.5 8 384.0 0.31   R Ulnar - Dig V      Wrist Dig V 3.10 10.3 21.1 11 45.5 3.10      Ref.  3.10  12.0  48.0    R Radial - Snuff box      Forearm Snuff box 2.54 40.1 59.5 10 55.2 2.54      Ref.  2.70 18.0           MOTOR NCS      Nerve / Sites Rec. Site Lat Amp Dist Erwin     ms mV cm m/s   L Median - APB      Wrist APB 3.54 15.1 8       Ref.  3.90 6.0        Elbow APB 7.73 14.5 23 54.9      Ref.     49.0   L Ulnar - ADM      Wrist ADM 3.06 9.5 8       Ref.  3.10 7.0        B.Elbow ADM 7.38 8.7 22 51.0      Ref.     50.0      A.Elbow ADM 8.65 8.7 10 78.7   R Median - APB      Wrist APB 3.67 11.3 8       Ref.  3.90 6.0        Elbow APB 7.75 11.1 23 56.3      Ref.     49.0   R Ulnar - ADM      Wrist ADM 3.06 10.5 8       Ref.  3.10 7.0        B.Elbow ADM 6.69 9.8 22 60.7      Ref.     50.0      A.Elbow ADM 8.42 8.9 10 57.8                        ----------------------------------------            Karolina Foss M.D., F.A.A.N.      Diplomate, American Board of Psychiatry and Neurology  Diplomate, American Board of Clinical Neurophysiology  Fellow, American Academy of Neurology

## 2024-07-22 NOTE — PROGRESS NOTES
[facility]   Physical Therapy Treatment Note + Plan of Care     Name: Sacha Fregoso  Clinic Number: [id number]    Therapy Diagnosis:   Encounter Diagnoses   Name Primary?    Patellofemoral pain syndrome of right knee Yes    Primary osteoarthritis of right knee     Right hip pain        Physician: John Ferguson MD     Physician Orders: PT Eval and Treat   Medical Diagnosis from Referral: Right hip pain [M25.551], Primary osteoarthritis of right knee [M17.11], Patellofemoral pain syndrome of right knee [M22.2X1]   Evaluation Date: 12/20/2023  Authorization Period Expiration: 4/12/24  Plan of Care Expiration: 8/10/24  Progress Note Due: 8/22/24  Visit # / Visits authorized: 23/30    Time In: 0755 AM   Time Out:  0904 AM   Total Billable Time: 45 minutes  Billing reflects 1:1 direct supervision    MD follow-up:    Precautions: Standard    FOTO:  Goal: 72%  -Intake: 59%  -Status: 56%  -Discharge: incomplete    Subjective     Pt reports: 7/22- Feeling okay today, notices the normal stiffness this morning    He was compliant with home exercise program.  Response to previous treatment: tolerated treatment well.   Functional change: improved ability to participate in high level ADL's in a limited fashion.     Pain: 7/10 at worst, 2/10 currently  Location: right knee     Objective      Objective Measures updated at progress report unless specified otherwise.    Primary Impairments:   Thigh strength  Dominant hamstring,   knee valgus,   knee lateral glide syndrome       Media Information              Treatment     Sacha received the treatments listed below:      Code  (see below chart) Intervention Date/Notes  7/22   TE* Bike 5'    TE* Dynamic stretch Lunge  HS  Quad   TE Biodex test 45 minutes       * indicates that exercise was performed, not billed today since patient was not under direct one-on-one supervision      Patient Education and Home Exercises         Education  provided:   Hip abductor loading progression  Adding specific exercises like ankle mobs, single leg squats, lunges with rotation before running to prime the movements    Written Home Exercises Provided: Patient instructed to cont prior HEP.  Exercises were reviewed and Sacha was able to demonstrate them prior to the end of the session.  Sacha demonstrated good  understanding of the education provided.     See EMR under Patient Instructions for exercises provided prior visit.    Assessment     7/22- biodex testing today indicates continued mild strength deficits in thigh muscles. He shows improvement in overall capacity compared to last biodex test period since he has been more successful with running recently, we will likely working towards discharge with home program with continued emphasis on single leg strengthening.      Sacha Is progressing well towards his goals.   Pt prognosis is Good.     Pt will continue to benefit from skilled outpatient physical therapy to address the deficits listed in the problem list box on initial evaluation, provide pt/family education and to maximize pt's level of independence in the home and community environment.     Pt's spiritual, cultural and educational needs considered and pt agreeable to plan of care and goals.    Anticipated barriers to physical therapy: chronicity of condition     Goals:     Short Term Goals:  6 weeks Status  Date Met   PAIN: Pt will report worst pain of 2/10 in order to progress toward max functional ability and improve quality of life. [] Progressing  [x] Met  [] Not Met  3/18/24   FUNCTION: Patient will demonstrate improved function as indicated by a functional score improvement of at least 5 points on FOTO. [] Progressing  [x] Met  [] Not Met  3/18/24   MOBILITY: Patient will improve AROM to 50% of stated goals, listed in objective measures above, in order to progress towards independence with functional activities.  [] Progressing  [x]  Met  [] Not Met  3/18/24   STRENGTH: Patient will improve strength to 50% of stated goals, listed in objective measures above, in order to progress towards independence with functional activities. [x] Progressing  [x] Met  [] Not Met  3/18/24   POSTURE: Patient will correct postural deviations in sitting and standing, to decrease pain and promote long term stability.  [] Progressing  [x] Met  [] Not Met  3/18/24   HEP: Patient will demonstrate independence with HEP in order to progress toward functional independence. [] Progressing  [x] Met  [] Not Met  3/18/24      Long Term Goals:  12 weeks Status Date Met   PAIN: Pt will report worst pain of 1/10 in order to progress toward max functional ability and improve quality of life [x] Progressing  [] Met  [] Not Met     FUNCTION: Patient will demonstrate improved function as indicated by a FOTO functional score improvement as listed in header. [x] Progressing  [] Met  [] Not Met     MOBILITY: Patient will improve AROM to stated goals, listed in objective measures above, in order to return to maximal functional potential and improve quality of life.  [x] Progressing  [x] Met  [] Not Met     STRENGTH: Patient will improve strength to stated goals, listed in objective measures above, in order to improve functional independence and quality of life.  [x] Progressing  [] Met  [] Not Met     GAIT: Patient will demonstrate normalized gait mechanics with minimal compensation in order to return to PLOF. [x] Progressing  [x] Met  [] Not Met     Patient will return to normal ADL's, IADL's, community involvement, recreational activities, and work-related activities with less than or equal to 1/10 pain and maximal function.  [x] Progressing  [] Met  [] Not Met          Plan       Continue with abductor loading progression  Continued to address movement deficits      Jv Pedro, PT,

## 2024-07-24 ENCOUNTER — ANESTHESIA EVENT (OUTPATIENT)
Dept: ENDOSCOPY | Facility: HOSPITAL | Age: 44
End: 2024-07-24
Payer: OTHER GOVERNMENT

## 2024-07-24 ENCOUNTER — HOSPITAL ENCOUNTER (OUTPATIENT)
Facility: HOSPITAL | Age: 44
Discharge: HOME | End: 2024-07-24
Attending: INTERNAL MEDICINE | Admitting: INTERNAL MEDICINE
Payer: OTHER GOVERNMENT

## 2024-07-24 ENCOUNTER — ANESTHESIA (OUTPATIENT)
Dept: ENDOSCOPY | Facility: HOSPITAL | Age: 44
End: 2024-07-24
Payer: OTHER GOVERNMENT

## 2024-07-24 DIAGNOSIS — K21.9 GERD (GASTROESOPHAGEAL REFLUX DISEASE): ICD-10-CM

## 2024-07-24 PROCEDURE — 43239 EGD BIOPSY SINGLE/MULTIPLE: CPT | Mod: ,,, | Performed by: INTERNAL MEDICINE

## 2024-07-24 PROCEDURE — 88305 TISSUE EXAM BY PATHOLOGIST: CPT | Performed by: PATHOLOGY

## 2024-07-24 PROCEDURE — 88305 TISSUE EXAM BY PATHOLOGIST: CPT | Mod: 26,,, | Performed by: PATHOLOGY

## 2024-07-24 PROCEDURE — 27201012 HC FORCEPS, HOT/COLD, DISP: Performed by: INTERNAL MEDICINE

## 2024-07-24 PROCEDURE — 37000008 HC ANESTHESIA 1ST 15 MINUTES: Performed by: INTERNAL MEDICINE

## 2024-07-24 PROCEDURE — 25000003 PHARM REV CODE 250

## 2024-07-24 PROCEDURE — 63600175 PHARM REV CODE 636 W HCPCS

## 2024-07-24 PROCEDURE — 43239 EGD BIOPSY SINGLE/MULTIPLE: CPT | Performed by: INTERNAL MEDICINE

## 2024-07-24 PROCEDURE — 37000009 HC ANESTHESIA EA ADD 15 MINS: Performed by: INTERNAL MEDICINE

## 2024-07-24 RX ORDER — LIDOCAINE HYDROCHLORIDE 10 MG/ML
INJECTION, SOLUTION EPIDURAL; INFILTRATION; INTRACAUDAL; PERINEURAL
Status: DISCONTINUED | OUTPATIENT
Start: 2024-07-24 | End: 2024-07-24

## 2024-07-24 RX ORDER — SODIUM CHLORIDE, SODIUM LACTATE, POTASSIUM CHLORIDE, CALCIUM CHLORIDE 600; 310; 30; 20 MG/100ML; MG/100ML; MG/100ML; MG/100ML
INJECTION, SOLUTION INTRAVENOUS CONTINUOUS PRN
Status: DISCONTINUED | OUTPATIENT
Start: 2024-07-24 | End: 2024-07-24

## 2024-07-24 RX ORDER — SODIUM CHLORIDE, SODIUM LACTATE, POTASSIUM CHLORIDE, CALCIUM CHLORIDE 600; 310; 30; 20 MG/100ML; MG/100ML; MG/100ML; MG/100ML
INJECTION, SOLUTION INTRAVENOUS CONTINUOUS
Status: DISCONTINUED | OUTPATIENT
Start: 2024-07-24 | End: 2024-07-24 | Stop reason: HOSPADM

## 2024-07-24 RX ORDER — PROPOFOL 10 MG/ML
VIAL (ML) INTRAVENOUS
Status: DISCONTINUED | OUTPATIENT
Start: 2024-07-24 | End: 2024-07-24

## 2024-07-24 RX ADMIN — SODIUM CHLORIDE, SODIUM LACTATE, POTASSIUM CHLORIDE, AND CALCIUM CHLORIDE: 600; 310; 30; 20 INJECTION, SOLUTION INTRAVENOUS at 10:07

## 2024-07-24 RX ADMIN — PROPOFOL 50 MG: 10 INJECTION, EMULSION INTRAVENOUS at 10:07

## 2024-07-24 RX ADMIN — PROPOFOL 60 MG: 10 INJECTION, EMULSION INTRAVENOUS at 10:07

## 2024-07-24 RX ADMIN — PROPOFOL 70 MG: 10 INJECTION, EMULSION INTRAVENOUS at 10:07

## 2024-07-24 RX ADMIN — LIDOCAINE HYDROCHLORIDE 100 MG: 10 SOLUTION INTRAVENOUS at 10:07

## 2024-07-24 NOTE — H&P
PRE PROCEDURE H&P    Patient Name: Sacha Fregoso  MRN: [id number]  : 1980  Date of Procedure:  2024  Referring Physician: Zuri Knowles MD  Primary Physician: Zuri Knowels MD  Procedure Physician: Magi Roth MD       Planned Procedure: EGD  Diagnosis: GERD  Chief Complaint: Same as above    HPI: Patient is an 43 y.o. male is here for the above.       Anticoagulation: None     Past Medical History:   Past Medical History:   Diagnosis Date    Colon polyp 2008 clear resume at 10 year interval.     GERD (gastroesophageal reflux disease)     Seasonal allergies         Past Surgical History:  Past Surgical History:   Procedure Laterality Date    COLONOSCOPY      COLONOSCOPY N/A 2023    Procedure: COLONOSCOPY;  Surgeon: Jennifer Mcfarland MD;  Location: [facility];  Service: Endoscopy;  Laterality: N/A;    VASECTOMY      wisdoom teeth          Home Medications:  Prior to Admission medications    Medication Sig Start Date End Date Taking? Authorizing Provider   diclofenac (VOLTAREN) 75 MG EC tablet Take 1 tablet (75 mg total) by mouth 2 (two) times daily with meals. 3/22/24  Yes John Ferguson MD   famotidine (PEPCID) 20 MG tablet Take 1 tablet (20 mg total) by mouth 2 (two) times daily. 22  Yes Zuri Knowles MD        Allergies:  Review of patient's allergies indicates:  No Known Allergies     Social History:   Social History     Socioeconomic History    Marital status:      Spouse name: Thao    Number of children: 3   Occupational History     Comment: full time [employer]   Tobacco Use    Smoking status: Former     Types: Cigarettes     Passive exposure: Never    Smokeless tobacco: Former     Types: Chew   Substance and Sexual Activity    Alcohol use: Yes     Alcohol/week: 1.0 - 2.0 standard drink of alcohol     Types: 1 - 2 Cans of beer per week     Comment: 2x monthly    Drug use: No    Sexual activity: Yes     Partners: Female     Birth  control/protection: Partner-Vasectomy   Social History Narrative    Mom is Saranya Fregoso     Social Determinants of Health     Financial Resource Strain: Low Risk  (11/30/2023)    Overall Financial Resource Strain (CARDIA)     Difficulty of Paying Living Expenses: Not hard at all   Food Insecurity: No Food Insecurity (11/30/2023)    Hunger Vital Sign     Worried About Running Out of Food in the Last Year: Never true     Ran Out of Food in the Last Year: Never true   Transportation Needs: No Transportation Needs (11/30/2023)    PRAPARE - Transportation     Lack of Transportation (Medical): No     Lack of Transportation (Non-Medical): No   Physical Activity: Sufficiently Active (11/30/2023)    Exercise Vital Sign     Days of Exercise per Week: 5 days     Minutes of Exercise per Session: 60 min   Stress: No Stress Concern Present (11/30/2023)    [employer] - Occupational Stress Questionnaire     Feeling of Stress : Only a little   Housing Stability: Low Risk  (11/30/2023)    Housing Stability Vital Sign     Unable to Pay for Housing in the Last Year: No     Number of Places Lived in the Last Year: 1     Unstable Housing in the Last Year: No       Family History:  Family History   Problem Relation Name Age of Onset    Diabetes Mother Mom     Hyperlipidemia Father Dad     No Known Problems Sister      No Known Problems Brother      No Known Problems Maternal Aunt      No Known Problems Maternal Uncle      No Known Problems Paternal Aunt      No Known Problems Paternal Uncle      No Known Problems Maternal Grandmother      No Known Problems Maternal Grandfather      COPD Paternal Grandmother Rissa     No Known Problems Paternal Grandfather      Cancer Paternal Aunt Viktoriya Stout        ROS: No acute cardiac events, no acute respiratory complaints.     Physical Exam (all patients):    /72 (BP Location: Left arm, Patient Position: Lying)   Pulse (!) 57   Temp 98.4 °F (36.9 °C)  "(Temporal)   Resp 18   Ht 5' 11" (1.803 m)   Wt 81.6 kg (180 lb)   SpO2 98%   BMI 25.10 kg/m²   Lungs: Clear to auscultation bilaterally, respirations unlabored  Heart: Regular rate and rhythm, S1 and S2 normal, no obvious murmurs  Abdomen:         Soft, non-tender, bowel sounds normal, no masses, no organomegaly    Lab Results   Component Value Date    WBC 5.13 02/14/2024    MCV 88 02/14/2024    RDW 12.2 02/14/2024     02/14/2024    GLU 82 02/14/2024    HGBA1C 4.8 02/14/2024    BUN 18 02/14/2024     02/14/2024    K 4.7 02/14/2024     02/14/2024        SEDATION PLAN: per anesthesia      History reviewed, vital signs satisfactory, cardiopulmonary status satisfactory, sedation options, risks and plans have been discussed with the patient  All their questions were answered and the patient agrees to the sedation procedures as planned and the patient is deemed an appropriate candidate for the sedation as planned.    Procedure explained to patient, informed consent obtained and placed in chart.    Magi Roth  7/24/2024  10:20 AM    "

## 2024-07-24 NOTE — ANESTHESIA PREPROCEDURE EVALUATION
07/24/2024  Sacha Fregoso is a 43 y.o., male.      Pre-op Assessment    I have reviewed the Patient Summary Reports.     I have reviewed the Nursing Notes. I have reviewed the NPO Status.   I have reviewed the Medications.     Review of Systems  Anesthesia Hx:  No problems with previous Anesthesia             Denies Family Hx of Anesthesia complications.    Denies Personal Hx of Anesthesia complications.                    Social:  Former Smoker       Hematology/Oncology:  Hematology Normal   Oncology Normal                                   EENT/Dental:  EENT/Dental Normal           Cardiovascular:  Cardiovascular Normal                                            Pulmonary:  Pulmonary Normal                       Renal/:  Renal/ Normal                 Hepatic/GI:     GERD             Musculoskeletal:  Musculoskeletal Normal                Neurological:  Neurology Normal                                      Endocrine:  Endocrine Normal            Dermatological:  Skin Normal    Psych:  Psychiatric Normal                    Physical Exam  General: Cooperative, Alert and Oriented    Airway:  Mallampati: II   Mouth Opening: Normal  TM Distance: Normal  Tongue: Normal  Neck ROM: Normal ROM    Dental:  Intact        Anesthesia Plan  Type of Anesthesia, risks & benefits discussed:    Anesthesia Type: MAC  Intra-op Monitoring Plan: Standard ASA Monitors  Induction:  IV  Informed Consent: Informed consent signed with the Patient and all parties understand the risks and agree with anesthesia plan.  All questions answered.   ASA Score: 2  Day of Surgery Review of History & Physical: H&P Update referred to the surgeon/provider.    Ready For Surgery From Anesthesia Perspective.     .

## 2024-07-24 NOTE — [employer]ATION PATIENT INSTRUCTIONS
Discharge Summary/Instructions after an Endoscopic Procedure  Patient Name: Sacha Fregoso  Patient MRN: [id number]  Patient YOB: 1980 Wednesday, July 24, 2024 Magi Roth MD  Dear patient,  As a result of recent federal legislation (The Federal Cures Act), you may   receive lab or pathology results from your procedure in your [facility]   account before your physician is able to contact you. Your physician or   their representative will relay the results to you with their   recommendations at their soonest availability.  Thank you,  RESTRICTIONS:  During your procedure today, you received medications for sedation.  These   medications may affect your judgment, balance and coordination.  Therefore,   for 24 hours, you have the following restrictions:   - DO NOT drive a car, operate machinery, make legal/financial decisions,   sign important papers or drink alcohol.    ACTIVITY:  Today: no heavy lifting, straining or running due to procedural   sedation/anesthesia.  The following day: return to full activity including work.  DIET:  Eat and drink normally unless instructed otherwise.     TREATMENT FOR COMMON SIDE EFFECTS:  - Mild abdominal pain, nausea, belching, bloating or excessive gas:  rest,   eat lightly and use a heating pad.  - Sore Throat: treat with throat lozenges and/or gargle with warm salt   water.  - Because air was used during the procedure, expelling large amounts of air   from your rectum or belching is normal.  - If a bowel prep was taken, you may not have a bowel movement for 1-3 days.    This is normal.  SYMPTOMS TO WATCH FOR AND REPORT TO YOUR PHYSICIAN:  1. Abdominal pain or bloating, other than gas cramps.  2. Chest pain.  3. Back pain.  4. Signs of infection such as: chills or fever occurring within 24 hours   after the procedure.  5. Rectal bleeding, which would show as bright red, maroon, or black stools.   (A tablespoon of blood from the rectum is not serious,  especially if   hemorrhoids are present.)  6. Vomiting.  7. Weakness or dizziness.  GO DIRECTLY TO THE NEAREST EMERGENCY ROOM IF YOU HAVE ANY OF THE FOLLOWING:      Difficulty breathing              Chills and/or fever over 101 F   Persistent vomiting and/or vomiting blood   Severe abdominal pain   Severe chest pain   Black, tarry stools   Bleeding- more than one tablespoon   Any other symptom or condition that you feel may need urgent attention  Your doctor recommends these additional instructions:  If any biopsies were taken, your doctors clinic will contact you in 1 to 2   weeks with any results.  - Discharge patient to home.   - Resume previous diet.   - Continue present medications.   - Await pathology results.   - Return to referring physician.  For questions, problems or results please call your physician Magi Roth MD at Work:  [phone]  If you have any questions about the above instructions, call the GI   department at [phone] or call the endoscopy unit at [phone]   from 7am until 3 pm.  [facility] [place], EMERGENCY ROOM PHONE NUMBER:   [phone]  IF A COMPLICATION OR EMERGENCY SITUATION ARISES AND YOU ARE UNABLE TO REACH   YOUR PHYSICIAN - GO DIRECTLY TO THE EMERGENCY ROOM.  I have read or have had read to me these discharge instructions for my   procedure and have received a written copy.  I understand these   instructions and will follow-up with my physician if I have any questions.     __________________________________       _____________________________________  Nurse Signature                                          Patient/Designated   Responsible Party Signature  Magi Roth MD  7/24/2024 10:39:43 AM  This report has been verified and signed electronically.  Dear patient,  As a result of recent federal legislation (The Federal Cures Act), you may   receive lab or pathology results from your procedure in your [facility]   account before  your physician is able to contact you. Your physician or   their representative will relay the results to you with their   recommendations at their soonest availability.  Thank you,  PROVATION

## 2024-07-24 NOTE — TRANSFER OF CARE
"Anesthesia Transfer of Care Note    Patient: Sacha Fregoso    Procedure(s) Performed: Procedure(s) (LRB):  EGD (ESOPHAGOGASTRODUODENOSCOPY) (N/A)    Patient location: GI    Anesthesia Type: MAC    Transport from OR: Transported from OR on room air with adequate spontaneous ventilation    Post pain: adequate analgesia    Post assessment: no apparent anesthetic complications    Post vital signs: stable    Level of consciousness: awake    Nausea/Vomiting: no nausea/vomiting    Complications: none    Transfer of care protocol was followed      Last vitals: Visit Vitals  /72 (BP Location: Left arm, Patient Position: Lying)   Pulse (!) 57   Temp 36.9 °C (98.4 °F) (Temporal)   Resp 18   Ht 5' 11" (1.803 m)   Wt 81.6 kg (180 lb)   SpO2 98%   BMI 25.10 kg/m²     "

## 2024-07-24 NOTE — ANESTHESIA POSTPROCEDURE EVALUATION
Anesthesia Post Evaluation    Patient: Sacha Fregoso    Procedure(s) Performed: Procedure(s) (LRB):  EGD (ESOPHAGOGASTRODUODENOSCOPY) (N/A)    Final Anesthesia Type: MAC      Patient location during evaluation: GI PACU  Patient participation: Yes- Able to Participate  Level of consciousness: awake and alert  Post-procedure vital signs: reviewed and stable  Pain management: adequate  Airway patency: patent    PONV status at discharge: No PONV  Anesthetic complications: no      Cardiovascular status: blood pressure returned to baseline and hemodynamically stable  Respiratory status: unassisted, spontaneous ventilation and room air  Hydration status: euvolemic  Follow-up not needed.              Vitals Value Taken Time   /58 07/24/24 1038   Temp 36.7 °C (98.1 °F) 07/24/24 1038   Pulse 61 07/24/24 1038   Resp 15 07/24/24 1038   SpO2 96 % 07/24/24 1038         No case tracking events are documented in the log.      Pain/Sagar Score: No data recorded

## 2024-07-25 VITALS
RESPIRATION RATE: 18 BRPM | DIASTOLIC BLOOD PRESSURE: 58 MMHG | TEMPERATURE: 98 F | SYSTOLIC BLOOD PRESSURE: 110 MMHG | OXYGEN SATURATION: 97 % | WEIGHT: 180 LBS | HEIGHT: 71 IN | HEART RATE: 57 BPM | BODY MASS INDEX: 25.2 KG/M2

## 2024-07-26 ENCOUNTER — CLINICAL SUPPORT (OUTPATIENT)
Facility: HOSPITAL | Age: 44
End: 2024-07-26
Payer: OTHER GOVERNMENT

## 2024-07-26 DIAGNOSIS — M25.551 RIGHT HIP PAIN: ICD-10-CM

## 2024-07-26 DIAGNOSIS — M22.2X1 PATELLOFEMORAL PAIN SYNDROME OF RIGHT KNEE: ICD-10-CM

## 2024-07-26 DIAGNOSIS — M17.11 PRIMARY OSTEOARTHRITIS OF RIGHT KNEE: ICD-10-CM

## 2024-07-26 LAB
FINAL PATHOLOGIC DIAGNOSIS: NORMAL
GROSS: NORMAL
Lab: NORMAL

## 2024-07-26 PROCEDURE — 97140 MANUAL THERAPY 1/> REGIONS: CPT | Mod: PN | Performed by: PHYSICAL THERAPIST

## 2024-07-26 PROCEDURE — 97110 THERAPEUTIC EXERCISES: CPT | Mod: PN | Performed by: PHYSICAL THERAPIST

## 2024-07-26 NOTE — PROGRESS NOTES
[facility]   Physical Therapy Treatment Note + Plan of Care     Name: Sacha Fregoso  Clinic Number: [id number]    Therapy Diagnosis:   Encounter Diagnoses   Name Primary?    Right hip pain     Patellofemoral pain syndrome of right knee     Primary osteoarthritis of right knee        Physician: John Ferguson MD     Physician Orders: PT Eval and Treat   Medical Diagnosis from Referral: Right hip pain [M25.551], Primary osteoarthritis of right knee [M17.11], Patellofemoral pain syndrome of right knee [M22.2X1]   Evaluation Date: 12/20/2023  Authorization Period Expiration: 4/12/24  Plan of Care Expiration: 8/10/24  Progress Note Due: 8/22/24  Visit # / Visits authorized: 24/30    Time In: 0755 AM   Time Out:  0915 AM   Total Billable Time: 45 minutes  Billing reflects 1:1 direct supervision    MD follow-up:    Precautions: Standard    FOTO:  Goal: 72%  -Intake: 59%  -Status: 56%  -Discharge: incomplete    Subjective     Pt reports: 7/26- Pt has ran twice since he last visited and said that both runs felt good. He is a little sore today but not too bad. His back still feels a little stiff.     He was compliant with home exercise program.  Response to previous treatment: tolerated treatment well.   Functional change: improved ability to participate in high level ADL's in a limited fashion.     Pain: 7/10 at worst, 2/10 currently  Location: right knee     Objective      Objective Measures updated at progress report unless specified otherwise.    Primary Impairments:   Thigh strength  Dominant hamstring,   knee valgus,   knee lateral glide syndrome       Media Information            Observation:         MOTOR CONTROL TESTS:    Right  (spine) Left  Goal   Lower extremity      Active straight leg raise [x] Positive  [] Negative  Comments: increased pelvis motion [x] Positive  [] Negative  Comments: increased pelvis motion Negative B    Prone knee bend [] Positive  [x]  Negative  Comments: [x] Positive  [] Negative  Comments: increased pelvis motion Negative B    Bent knee fallout [x] Positive  [] Negative  Comments: increased pelvis motion [x] Positive  [] Negative  Comments: increased pelvis motion Negative B    Supine march [x] Positive  [] Negative  Comments: increased pelvis motion [x] Positive  [] Negative  Comments: increased pelvis motion    Supine heel slide [] Positive  [x] Negative  Comments: [] Positive  [x] Negative  Comments:            RANGE OF MOTION:   Lumbar ROM Right  (spine) Left   Pain/Dysfunction with Movement Goal   Lumbar Flexion (60º) 100% --- Unable to reverse lower lumbar lordosis    Lumbar Extension (30º) 75% --- Lower lumbar jaron    Lumbar Side Bending (25º) 75% 75% Feels more muscle stretch with L SB    Lumbar Rotation 90% 90%           STRENGTH:   L/E MMT Right  (spine) Left Pain/Dysfunction with Movement Goal   Hip Extension  4/5 3+/5 Hamstring dominant L glute 4+/5 B            Joint mobility:  Increased mobility with AP assessment at L5-S1  Impaired R Lumbar sidebend in mid lumbar spine  Impaired rotation at TLJ      SENSATION  [x] Intact to Light Touch   [] Impaired:      PALPATION: Structures: Increased tenderness to palpation of:        Treatment     Sacha received the treatments listed below:        Code  (see below chart) Intervention Date/Notes  7/26   MT Manual Knee gapping  Lumbar gapping Gr5   TE Bike 5'   TE Lumbar assessment 30 minutes     Dynamic stretch Lunge  HS  Quad   NR* Glute activation biofeedback (emphasize core, glute, then HS activation)  x8  on each side   NR* Abdominal cuff biofeedback Bracing x 5  Bent knee fallout x 10  March x 10   NR* Side plank hip abduction 3 x 10 - lacy   TE* Leg press  225# 1 x 8 ea,  245# 3 x 8    TE* Knee extension tempo 1 x 8, 85#  2 x 8, 95#   TE* HS curl machine 3 x 10     35 minutes of Therapeutic Exercise (TE) to develop strength, endurance, ROM, and flexibility. (53543)  10 minutes of  Manual therapy (MT) to improve pain and ROM. (97932)  00 minutes of Neuromuscular Re-Education (NMR)  to improve: Balance, Coordination, Kinesthetic, Sense, Proprioception, and Posture. (87773)  00 minutes of Therapeutic Activities (TA) to improve functional performance. (06688)  Unattended Electrical Stimulation (ES) for muscle performance and/or pain modulation. (53757)  Vasopneumatic Device Therapy () for management of swelling/edema. (74440)  BFR: Blood flow restriction applied during exercise  NP: Not Performed    * indicates that exercise was performed, not billed today since patient was not under direct one-on-one supervision      Patient Education and Home Exercises         Education provided:   Hip abductor loading progression  Adding specific exercises like ankle mobs, single leg squats, lunges with rotation before running to prime the movements    Written Home Exercises Provided: Patient instructed to cont prior HEP.  Exercises were reviewed and Sacha was able to demonstrate them prior to the end of the session.  Sacha demonstrated good  understanding of the education provided.     See EMR under Patient Instructions for exercises provided prior visit.    Assessment     7/26- Patient noted to have motor control impairments today in the lumbar spine that are consistent with lumbar extension/rotation syndrome. Improved stability with cuing. He would benefit from continued work on lumbar motor control and overall core strength to improve lumbar symptoms.      Sacha Is progressing well towards his goals.   Pt prognosis is Good.     Pt will continue to benefit from skilled outpatient physical therapy to address the deficits listed in the problem list box on initial evaluation, provide pt/family education and to maximize pt's level of independence in the home and community environment.     Pt's spiritual, cultural and educational needs considered and pt agreeable to plan of care and  goals.    Anticipated barriers to physical therapy: chronicity of condition     Goals:     Short Term Goals:  6 weeks Status  Date Met   PAIN: Pt will report worst pain of 2/10 in order to progress toward max functional ability and improve quality of life. [] Progressing  [x] Met  [] Not Met  3/18/24   FUNCTION: Patient will demonstrate improved function as indicated by a functional score improvement of at least 5 points on FOTO. [] Progressing  [x] Met  [] Not Met  3/18/24   MOBILITY: Patient will improve AROM to 50% of stated goals, listed in objective measures above, in order to progress towards independence with functional activities.  [] Progressing  [x] Met  [] Not Met  3/18/24   STRENGTH: Patient will improve strength to 50% of stated goals, listed in objective measures above, in order to progress towards independence with functional activities. [x] Progressing  [x] Met  [] Not Met  3/18/24   POSTURE: Patient will correct postural deviations in sitting and standing, to decrease pain and promote long term stability.  [] Progressing  [x] Met  [] Not Met  3/18/24   HEP: Patient will demonstrate independence with HEP in order to progress toward functional independence. [] Progressing  [x] Met  [] Not Met  3/18/24      Long Term Goals:  12 weeks Status Date Met   PAIN: Pt will report worst pain of 1/10 in order to progress toward max functional ability and improve quality of life [x] Progressing  [] Met  [] Not Met     FUNCTION: Patient will demonstrate improved function as indicated by a FOTO functional score improvement as listed in header. [x] Progressing  [] Met  [] Not Met     MOBILITY: Patient will improve AROM to stated goals, listed in objective measures above, in order to return to maximal functional potential and improve quality of life.  [x] Progressing  [x] Met  [] Not Met     STRENGTH: Patient will improve strength to stated goals, listed in objective measures above, in order to improve functional  independence and quality of life.  [x] Progressing  [] Met  [] Not Met     GAIT: Patient will demonstrate normalized gait mechanics with minimal compensation in order to return to PLOF. [x] Progressing  [x] Met  [] Not Met     Patient will return to normal ADL's, IADL's, community involvement, recreational activities, and work-related activities with less than or equal to 1/10 pain and maximal function.  [x] Progressing  [] Met  [] Not Met          Plan       Continue with abductor loading progression  Continued to address movement deficits      Jv Pedro PT,

## 2024-08-05 ENCOUNTER — CLINICAL SUPPORT (OUTPATIENT)
Facility: HOSPITAL | Age: 44
End: 2024-08-05
Payer: OTHER GOVERNMENT

## 2024-08-05 ENCOUNTER — HOSPITAL ENCOUNTER (OUTPATIENT)
Dept: RADIOLOGY | Facility: HOSPITAL | Age: 44
Discharge: HOME | End: 2024-08-05
Attending: FAMILY MEDICINE
Payer: OTHER GOVERNMENT

## 2024-08-05 DIAGNOSIS — G89.29 CHRONIC BILATERAL LOW BACK PAIN WITHOUT SCIATICA: ICD-10-CM

## 2024-08-05 DIAGNOSIS — M17.11 PRIMARY OSTEOARTHRITIS OF RIGHT KNEE: ICD-10-CM

## 2024-08-05 DIAGNOSIS — M54.50 CHRONIC BILATERAL LOW BACK PAIN WITHOUT SCIATICA: ICD-10-CM

## 2024-08-05 DIAGNOSIS — M22.2X1 PATELLOFEMORAL PAIN SYNDROME OF RIGHT KNEE: Primary | ICD-10-CM

## 2024-08-05 DIAGNOSIS — M25.551 RIGHT HIP PAIN: ICD-10-CM

## 2024-08-05 PROCEDURE — 72100 X-RAY EXAM L-S SPINE 2/3 VWS: CPT | Mod: TC

## 2024-08-05 PROCEDURE — 72100 X-RAY EXAM L-S SPINE 2/3 VWS: CPT | Mod: 26,,, | Performed by: RADIOLOGY

## 2024-08-05 PROCEDURE — 97140 MANUAL THERAPY 1/> REGIONS: CPT | Mod: PN | Performed by: PHYSICAL THERAPIST

## 2024-08-05 PROCEDURE — 97112 NEUROMUSCULAR REEDUCATION: CPT | Mod: PN | Performed by: PHYSICAL THERAPIST

## 2024-08-12 ENCOUNTER — CLINICAL SUPPORT (OUTPATIENT)
Facility: HOSPITAL | Age: 44
End: 2024-08-12
Payer: OTHER GOVERNMENT

## 2024-08-12 DIAGNOSIS — M22.2X1 PATELLOFEMORAL PAIN SYNDROME OF RIGHT KNEE: Primary | ICD-10-CM

## 2024-08-12 DIAGNOSIS — M17.11 PRIMARY OSTEOARTHRITIS OF RIGHT KNEE: ICD-10-CM

## 2024-08-12 DIAGNOSIS — M25.551 RIGHT HIP PAIN: ICD-10-CM

## 2024-08-12 PROCEDURE — 97112 NEUROMUSCULAR REEDUCATION: CPT | Mod: PN | Performed by: PHYSICAL THERAPIST

## 2024-08-12 PROCEDURE — 97140 MANUAL THERAPY 1/> REGIONS: CPT | Mod: PN | Performed by: PHYSICAL THERAPIST

## 2024-08-12 NOTE — PLAN OF CARE
[facility]  Physical Therapy Plan of Care Note     Name: Sacha Fregoso  Clinic Number: [id number]    Therapy Diagnosis:   Encounter Diagnoses   Name Primary?    Patellofemoral pain syndrome of right knee Yes    Primary osteoarthritis of right knee     Right hip pain      Physician: John Ferguson MD    Visit Date: 8/12/2024     Physician Orders: PT Eval and Treat   Medical Diagnosis from Referral: Right hip pain [M25.551], Primary osteoarthritis of right knee [M17.11], Patellofemoral pain syndrome of right knee [M22.2X1]   Evaluation Date: 12/20/2023  Authorization Period Expiration: 4/12/24  Plan of Care Expiration: 8/10/24  Progress Note Due: 8/22/24  Visit # / Visits authorized: 26/30    Precautions: Standard  Functional Level Prior to Evaluation:  Limited due to pain    SUBJECTIVE     Update: Pt continues to progress his running plan. Patient felt medial knee pain on both sides right after his run this weekend but they just feel sore now. He reports that his back feels stiff especially in the morning.     OBJECTIVE     Update:     Media Information             Observation:            MOTOR CONTROL TESTS:     Right  (spine) Left  Goal   Lower extremity         Active straight leg raise [x] Positive  [] Negative  Comments: increased pelvis motion [x] Positive  [] Negative  Comments: increased pelvis motion Negative B    Prone knee bend [] Positive  [x] Negative  Comments: [x] Positive  [] Negative  Comments: increased pelvis motion Negative B    Bent knee fallout [x] Positive  [] Negative  Comments: increased pelvis motion [x] Positive  [] Negative  Comments: increased pelvis motion Negative B    Supine march [x] Positive  [] Negative  Comments: increased pelvis motion [x] Positive  [] Negative  Comments: increased pelvis motion     Supine heel slide [] Positive  [x] Negative  Comments: [] Positive  [x] Negative  Comments:                 RANGE OF MOTION:   Lumbar ROM  Right  (spine) Left    Pain/Dysfunction with Movement Goal   Lumbar Flexion (60º) 100% --- Unable to reverse lower lumbar lordosis     Lumbar Extension (30º) 75% --- Lower lumbar jaron     Lumbar Side Bending (25º) 75% 75% Feels more muscle stretch with L SB     Lumbar Rotation 90% 90%              STRENGTH:   L/E MMT Right  (spine) Left Pain/Dysfunction with Movement Goal   Hip Extension  4/5 3+/5 Hamstring dominant L glute 4+/5 B               Joint mobility:  Increased mobility with AP assessment at L5-S1  Impaired R Lumbar sidebend in mid lumbar spine  Impaired rotation at TLJ        SENSATION  [x] Intact to Light Touch                 [] Impaired:        PALPATION: Structures: Increased tenderness to palpation of:      ASSESSMENT     Update: Patient had limitations in L lumbar rotation which was improved through manual therapy. Patient has impaired lumbopelvic stability but is improving in control augmented by his biofeedback exercises. Patient displayed improved lumbopelvic stability during SL RDL. He would still benefit from physical therapy to continue to work on motor control to improve lumbopelvic stability, LE strength, and core strength.     Previous Short Term Goals Status:   Met  New Short Term Goals Status:   Met  Long Term Goal Status: continue per initial plan of care.  Reasons for Recertification of Therapy:   Goals not fully met    GOALS  Short Term Goals:  6 weeks Status  Date Met   PAIN: Pt will report worst pain of 2/10 in order to progress toward max functional ability and improve quality of life. [] Progressing  [x] Met  [] Not Met  3/18/24   FUNCTION: Patient will demonstrate improved function as indicated by a functional score improvement of at least 5 points on FOTO. [] Progressing  [x] Met  [] Not Met  3/18/24   MOBILITY: Patient will improve AROM to 50% of stated goals, listed in objective measures above, in order to progress towards independence with functional activities.  []  Progressing  [x] Met  [] Not Met  3/18/24   STRENGTH: Patient will improve strength to 50% of stated goals, listed in objective measures above, in order to progress towards independence with functional activities. [x] Progressing  [x] Met  [] Not Met  3/18/24   POSTURE: Patient will correct postural deviations in sitting and standing, to decrease pain and promote long term stability.  [] Progressing  [x] Met  [] Not Met  3/18/24   HEP: Patient will demonstrate independence with HEP in order to progress toward functional independence. [] Progressing  [x] Met  [] Not Met  3/18/24      Long Term Goals:  12 weeks Status Date Met   PAIN: Pt will report worst pain of 1/10 in order to progress toward max functional ability and improve quality of life [x] Progressing  [] Met  [] Not Met     FUNCTION: Patient will demonstrate improved function as indicated by a FOTO functional score improvement as listed in header. [x] Progressing  [] Met  [] Not Met     MOBILITY: Patient will improve AROM to stated goals, listed in objective measures above, in order to return to maximal functional potential and improve quality of life.  [x] Progressing  [x] Met  [] Not Met     STRENGTH: Patient will improve strength to stated goals, listed in objective measures above, in order to improve functional independence and quality of life.  [x] Progressing  [] Met  [] Not Met     GAIT: Patient will demonstrate normalized gait mechanics with minimal compensation in order to return to PLOF. [x] Progressing  [x] Met  [] Not Met     Patient will return to normal ADL's, IADL's, community involvement, recreational activities, and work-related activities with less than or equal to 1/10 pain and maximal function.  [x] Progressing  [] Met  [] Not Met          PLAN     Updated Certification Period: 8/12/24 to 10/12/24   Recommended Treatment Plan: 1 times per week for 8 weeks:  Manual Therapy, Moist Heat/ Ice, Neuromuscular Re-ed, Patient Education,  Therapeutic Activities, Therapeutic Exercise, Gait Training, Electrical Stimulation PRN, and Dry needling PRN.     Other Recommendations: None    Jv Pedro, PT

## 2024-08-12 NOTE — PROGRESS NOTES
[facility]   Physical Therapy Treatment Note + Plan of Care     Name: Sacha Fregoso  Clinic Number: [id number]    Therapy Diagnosis:   No diagnosis found.      Physician: John Ferguson MD     Physician Orders: PT Eval and Treat   Medical Diagnosis from Referral: Right hip pain [M25.551], Primary osteoarthritis of right knee [M17.11], Patellofemoral pain syndrome of right knee [M22.2X1]   Evaluation Date: 12/20/2023  Authorization Period Expiration: 4/12/24  Plan of Care Expiration: 8/10/24. 10/12/24  Progress Note Due: 8/22/24  Visit # / Visits authorized: 26/30    Time In: 8:00 am  Time Out: 9:20 am   Total Billable Time: 25 minutes  Billing reflects 1:1 direct supervision    MD follow-up:    Precautions: Standard    FOTO:  Goal: 72%  -Intake: 59%  -Status: 56%  -Discharge: incomplete    Subjective     Pt reports: 8/9- Pt continues to progress his running plan. Patient felt medial knee pain on both sides right after his run this weekend but they just feel sore now. He reports that his back feels stiff especially in the morning.     He was compliant with home exercise program.  Response to previous treatment: tolerated treatment well.   Functional change: improved ability to participate in high level ADL's in a limited fashion.     Pain: 7/10 at worst, 2/10 currently  Location: right knee     Objective      Objective Measures updated at progress report unless specified otherwise.    Primary Impairments:   Thigh strength  Dominant hamstring,   knee valgus,   knee lateral glide syndrome       Media Information            Observation:         MOTOR CONTROL TESTS:    Right  (spine) Left  Goal   Lower extremity      Active straight leg raise [x] Positive  [] Negative  Comments: increased pelvis motion [x] Positive  [] Negative  Comments: increased pelvis motion Negative B    Prone knee bend [] Positive  [x] Negative  Comments: [x] Positive  [] Negative  Comments: increased pelvis  motion Negative B    Bent knee fallout [x] Positive  [] Negative  Comments: increased pelvis motion [x] Positive  [] Negative  Comments: increased pelvis motion Negative B    Supine march [x] Positive  [] Negative  Comments: increased pelvis motion [x] Positive  [] Negative  Comments: increased pelvis motion    Supine heel slide [] Positive  [x] Negative  Comments: [] Positive  [x] Negative  Comments:            RANGE OF MOTION:   Lumbar ROM Right  (spine) Left   Pain/Dysfunction with Movement Goal   Lumbar Flexion (60º) 100% --- Unable to reverse lower lumbar lordosis    Lumbar Extension (30º) 75% --- Lower lumbar jaron    Lumbar Side Bending (25º) 75% 75% Feels more muscle stretch with L SB    Lumbar Rotation 90% 90%           STRENGTH:   L/E MMT Right  (spine) Left Pain/Dysfunction with Movement Goal   Hip Extension  4/5 3+/5 Hamstring dominant L glute 4+/5 B            Joint mobility:  Increased mobility with AP assessment at L5-S1  Impaired R Lumbar sidebend in mid lumbar spine  Impaired rotation at TLJ      SENSATION  [x] Intact to Light Touch   [] Impaired:      PALPATION: Structures: Increased tenderness to palpation of:        Treatment     Jackychanning received the treatments listed below:        Code  (see below chart) Intervention Date/Notes  8/12   MT Manual Knee gapping  Lumbar gapping Gr5   TE* Bike 5'    TE* Dynamic stretch Lunge  HS  Quad   NR Glute activation biofeedback (emphasize core, glute, then HS activation)  x8  on each side   NR Abdominal cuff biofeedback Bracing x 5  Bent knee fallout x 10  March x 10   NR* Dead bug ball 3 x 6 ea   NR* Side plank hip abduction 3 x 10 lacy   NR* Elevated bridge SL 30# 3 x 10   TE* Leg press  285# 1 x 8 ea,  305# 2 x 8       TE* Knee extension tempo 1 x 8, 85#  2 x 8, 95#     00 minutes of Therapeutic Exercise (TE) to develop strength, endurance, ROM, and flexibility. (49466)  15 minutes of Manual therapy (MT) to improve pain and ROM. (55901)  10 minutes of  Neuromuscular Re-Education (NMR)  to improve: Balance, Coordination, Kinesthetic, Sense, Proprioception, and Posture. (69941)  00 minutes of Therapeutic Activities (TA) to improve functional performance. (78883)  Unattended Electrical Stimulation (ES) for muscle performance and/or pain modulation. (92360)  Vasopneumatic Device Therapy () for management of swelling/edema. (89147)  BFR: Blood flow restriction applied during exercise  NP: Not Performed    * indicates that exercise was performed, not billed today since patient was not under direct one-on-one supervision      Patient Education and Home Exercises         Education provided:   Hip abductor loading progression  Adding specific exercises like ankle mobs, single leg squats, lunges with rotation before running to prime the movements    Written Home Exercises Provided: Patient instructed to cont prior HEP.  Exercises were reviewed and Sacha was able to demonstrate them prior to the end of the session.  Sacha demonstrated good  understanding of the education provided.     See EMR under Patient Instructions for exercises provided prior visit.    Assessment   8/9- Patient had limitations in L lumbar rotation which was improved through manual therapy. Patient has impaired lumbopelvic stability but is improving in control augmented by his biofeedback exercises. Patient displayed improved lumbopelvic stability during SL RDL. He would still benefit from physical therapy to continue to work on motor control to improve lumbopelvic stability, LE strength, and core strength.         Sacha Is progressing well towards his goals.   Pt prognosis is Good.     Pt will continue to benefit from skilled outpatient physical therapy to address the deficits listed in the problem list box on initial evaluation, provide pt/family education and to maximize pt's level of independence in the home and community environment.     Pt's spiritual, cultural and educational  needs considered and pt agreeable to plan of care and goals.    Anticipated barriers to physical therapy: chronicity of condition     Goals:     Short Term Goals:  6 weeks Status  Date Met   PAIN: Pt will report worst pain of 2/10 in order to progress toward max functional ability and improve quality of life. [] Progressing  [x] Met  [] Not Met  3/18/24   FUNCTION: Patient will demonstrate improved function as indicated by a functional score improvement of at least 5 points on FOTO. [] Progressing  [x] Met  [] Not Met  3/18/24   MOBILITY: Patient will improve AROM to 50% of stated goals, listed in objective measures above, in order to progress towards independence with functional activities.  [] Progressing  [x] Met  [] Not Met  3/18/24   STRENGTH: Patient will improve strength to 50% of stated goals, listed in objective measures above, in order to progress towards independence with functional activities. [x] Progressing  [x] Met  [] Not Met  3/18/24   POSTURE: Patient will correct postural deviations in sitting and standing, to decrease pain and promote long term stability.  [] Progressing  [x] Met  [] Not Met  3/18/24   HEP: Patient will demonstrate independence with HEP in order to progress toward functional independence. [] Progressing  [x] Met  [] Not Met  3/18/24      Long Term Goals:  12 weeks Status Date Met   PAIN: Pt will report worst pain of 1/10 in order to progress toward max functional ability and improve quality of life [x] Progressing  [] Met  [] Not Met     FUNCTION: Patient will demonstrate improved function as indicated by a FOTO functional score improvement as listed in header. [x] Progressing  [] Met  [] Not Met     MOBILITY: Patient will improve AROM to stated goals, listed in objective measures above, in order to return to maximal functional potential and improve quality of life.  [x] Progressing  [x] Met  [] Not Met     STRENGTH: Patient will improve strength to stated goals, listed in  objective measures above, in order to improve functional independence and quality of life.  [x] Progressing  [] Met  [] Not Met     GAIT: Patient will demonstrate normalized gait mechanics with minimal compensation in order to return to PLOF. [x] Progressing  [x] Met  [] Not Met     Patient will return to normal ADL's, IADL's, community involvement, recreational activities, and work-related activities with less than or equal to 1/10 pain and maximal function.  [x] Progressing  [] Met  [] Not Met          Plan       Continue with abductor loading progression  Continued to address movement deficits      Ivone Alvarez, SPT,

## 2024-08-13 ENCOUNTER — PATIENT MESSAGE (OUTPATIENT)
Dept: GASTROENTEROLOGY | Facility: CLINIC | Age: 44
End: 2024-08-13
Payer: OTHER GOVERNMENT

## 2024-08-19 ENCOUNTER — OFFICE VISIT (OUTPATIENT)
Dept: OTOLARYNGOLOGY | Facility: CLINIC | Age: 44
End: 2024-08-19
Payer: OTHER GOVERNMENT

## 2024-08-19 ENCOUNTER — CLINICAL SUPPORT (OUTPATIENT)
Dept: AUDIOLOGY | Facility: CLINIC | Age: 44
End: 2024-08-19
Payer: OTHER GOVERNMENT

## 2024-08-19 ENCOUNTER — CLINICAL SUPPORT (OUTPATIENT)
Facility: HOSPITAL | Age: 44
End: 2024-08-19
Payer: OTHER GOVERNMENT

## 2024-08-19 VITALS — BODY MASS INDEX: 25.2 KG/M2 | HEIGHT: 71 IN | WEIGHT: 180 LBS

## 2024-08-19 DIAGNOSIS — J30.89 NON-SEASONAL ALLERGIC RHINITIS, UNSPECIFIED TRIGGER: ICD-10-CM

## 2024-08-19 DIAGNOSIS — Z57.0 OCCUPATIONAL EXPOSURE TO NOISE: ICD-10-CM

## 2024-08-19 DIAGNOSIS — M22.2X1 PATELLOFEMORAL PAIN SYNDROME OF RIGHT KNEE: Primary | ICD-10-CM

## 2024-08-19 DIAGNOSIS — J34.3 HYPERTROPHY OF INFERIOR NASAL TURBINATE: Primary | ICD-10-CM

## 2024-08-19 DIAGNOSIS — M17.11 PRIMARY OSTEOARTHRITIS OF RIGHT KNEE: ICD-10-CM

## 2024-08-19 DIAGNOSIS — H93.13 TINNITUS OF BOTH EARS: ICD-10-CM

## 2024-08-19 DIAGNOSIS — M25.551 RIGHT HIP PAIN: ICD-10-CM

## 2024-08-19 PROCEDURE — 99204 OFFICE O/P NEW MOD 45 MIN: CPT | Mod: S$PBB,,, | Performed by: OTOLARYNGOLOGY

## 2024-08-19 PROCEDURE — 97112 NEUROMUSCULAR REEDUCATION: CPT | Mod: PN | Performed by: PHYSICAL THERAPIST

## 2024-08-19 PROCEDURE — 99999PBSHW PR PBB SHADOW TECHNICAL ONLY FILED TO HB: Mod: PBBFAC,,,

## 2024-08-19 PROCEDURE — 92555 SPEECH THRESHOLD AUDIOMETRY: CPT | Mod: PBBFAC

## 2024-08-19 PROCEDURE — 99212 OFFICE O/P EST SF 10 MIN: CPT | Mod: PBBFAC,27

## 2024-08-19 PROCEDURE — 97140 MANUAL THERAPY 1/> REGIONS: CPT | Mod: PN | Performed by: PHYSICAL THERAPIST

## 2024-08-19 PROCEDURE — 99999 PR PBB SHADOW E&M-EST. PATIENT-LVL III: CPT | Mod: PBBFAC,,, | Performed by: OTOLARYNGOLOGY

## 2024-08-19 PROCEDURE — 99999 PR PBB SHADOW E&M-EST. PATIENT-LVL II: CPT | Mod: PBBFAC,,,

## 2024-08-19 PROCEDURE — 92552 PURE TONE AUDIOMETRY AIR: CPT | Mod: PBBFAC

## 2024-08-19 PROCEDURE — 97110 THERAPEUTIC EXERCISES: CPT | Mod: PN | Performed by: PHYSICAL THERAPIST

## 2024-08-19 PROCEDURE — 99213 OFFICE O/P EST LOW 20 MIN: CPT | Mod: PBBFAC,25 | Performed by: OTOLARYNGOLOGY

## 2024-08-19 PROCEDURE — 92567 TYMPANOMETRY: CPT | Mod: PBBFAC

## 2024-08-19 RX ORDER — FLUTICASONE PROPIONATE 50 MCG
2 SPRAY, SUSPENSION (ML) NASAL DAILY
Qty: 16 G | Refills: 5 | Status: SHIPPED | OUTPATIENT
Start: 2024-08-19 | End: 2024-11-17

## 2024-08-19 SDOH — SOCIAL DETERMINANTS OF HEALTH (SDOH): OCCUPATIONAL EXPOSURE TO NOISE: Z57.0

## 2024-08-19 NOTE — PROGRESS NOTES
"Referring Provider:    Zuri Knowles Md  [address] [facility]  [place] [id number]  Subjective:   Patient: Sacha Fregoso [id number], :1980   Visit date:2024 11:10 AM    Chief Complaint:  tinnitus, sinus issue (Pt states has had congestion constantly x10 years. Hx of nasal fracture at 6 years old)    HPI:    Prior notes reviewed by myself.  Clinical documentation obtained by nursing staff reviewed.     44 y/o gentleman here for evaluation of tinnitus and sinonasal symptoms.  His tinnitus is nonpulsatile and bilateral and has been present since  at which time he had a loud noise exposure event while participating in training with the [employer].  He reports fluctuating tinnitus that sometimes intereferes with his work during the day but does not cause insomnia.  He denies any significant dizziness.  He also reports frequent allergic rhinitis symptoms, primarily congestion bilaterally right greater than left.  He has tried flonase inconsistently in the past as well as antihistamines with mixed success.  He reports bitemporal headaches that happen occasionally that he attributes to his sinuses.        Objective:     Physical Exam:  Vitals:  Ht 5' 11" (1.803 m)   Wt 81.6 kg (180 lb)   BMI 25.10 kg/m²   General appearance:  Well developed, well nourished    Ears:  Otoscopy of external auditory canals and tympanic membranes was normal, clinical speech reception thresholds grossly intact, no mass/lesion of auricle.    Nose:  No masses/lesions of external nose, congested nasal mucosa, septum, and turbinates were hypertrophied 3+.    Mouth:  No mass/lesion of lips, teeth, gums, hard/soft palate, tongue, tonsils, or oropharynx.    Neck & Lymphatics:  No cervical lymphadenopathy, no neck mass/crepitus/ asymmetry, trachea is midline, no thyroid enlargement/tenderness/mass.        [x]  Data Reviewed:    Lab Results   Component Value Date    WBC 5.13 2024    HGB 14.5 2024    HCT 42.9 " 02/14/2024    MCV 88 02/14/2024    EOSINOPHIL 2.1 02/14/2024         [x]  Independent interpretation of test: essentially normal hearing            Assessment & Plan:   Hypertrophy of inferior nasal turbinate  -     fluticasone propionate (FLONASE) 50 mcg/actuation nasal spray; 2 sprays (100 mcg total) by Each Nostril route once daily.  Dispense: 16 g; Refill: 5    Tinnitus of both ears  -     Ambulatory referral/consult to ENT    Non-seasonal allergic rhinitis, unspecified trigger    Occupational exposure to noise          We reviewed his history, exam and audiogram findings.  He has had tinnitus since a loud noise exposure event that occurred while training with the [employer].  Thankfully, his hearing is normal.  However, I have no doubt that the persistent tinnitus is directly related to this noise exposure event.  WE discussed conservative measures for managing tinnitus as noted below.   He has frequent nasal congestion and hypertrophied inferior turbinates.  I recommended using Flonase consistently and he understands that a submucous resection of his inferior turbinates is a good option if he continues to have congestion after the trial of Flonase.  He will follow up as needed    We reviewed his audiogram together in detail.  We also discussed that tinnitus is most often caused by a hearing loss, and that as the hair cells are damaged, either genetic or as a result of loud noise exposure, they then cause tinnitus.  Some patients find that restricting the salt or caffeine in their diet helps, and there is also an OTC supplement, lipoflavonoids, that some people find to be effective though their benefit is not fully proven. Arches is another supplement that some patients have seen improvement with.  Tinnitus tends to be louder in times of stress and fatigue, and may decrease with time.  Sound machines may also be an effective masking technique if needed at night.     Keenan Yap M.D.  Department of  Otolaryngology - Head & Neck Surgery  [address] [facility].  [place] [address]  P: [phone]  F: [phone]        DISCLAIMER: This note was prepared with [employer] voice recognition transcription software. Garbled syntax, mangled pronouns, and other bizarre constructions may be attributed to that software system. While efforts were made to correct any mistakes made by this voice recognition program, some errors and/or omissions may remain in the note that were missed when the note was originally created.

## 2024-08-19 NOTE — PROGRESS NOTES
Referring Provider: Dr. Zuri Goncalves Naif Fregoso was seen 08/19/2024 for an audiological evaluation. Patient complains of tinnitus in both ears for the past 13 years. He has noted it has gotten worse over time. There is a history of noise exposure with  service. Patient also reported difficulties hearing in background noise. Per report, difficulties hearing started a few years ago. Patient denied dizziness and family history of hearing loss.     Otoscopy revealed clear canals with visualization of the tympanic membrane in both ears. Tympanograms were Type Ad for the right ear and Type Ad for the left ear. Audiometry revealed normal hearing sensitivity in both ears. Speech Reception Thresholds were  15 dBHL for the right ear and 15 dBHL for the left ear.     Patient was counseled on the above findings. We discussed that tinnitus is the sensation of noise or ringing in the ears or the head. Tinnitus sounds can include ringing, roaring, buzzing, clicking, beating, whooshing, whistling, humming, or other noises. Tinnitus is most often caused by a hearing loss or repeated noise exposure. As the hair cells are damaged, either genetic or as a result of loud noise exposure, they then cause tinnitus- which is believed to be our brains generating sounds that we are no longer hearing. Tinnitus can also be linked to different conditions/risk factors including diabetes, high cholesterol, or an anxiety disorder.     Unfortunately, there is no proven cure for tinnitus, however there are different management options. We discussed use of external maskers (e.g., sound machines) and personal tinnitus maskers. People with hearing loss and tinnitus can find a reduction in their tinnitus with use of hearing aids. Research has also shown benefits from cognitive behavioral therapy or tinnitus retraining therapy. Some patients find that restricting the salt or caffeine in their diet helps, and there is also an OTC  supplement, lipflavinoids, that some people find to be effective though their benefit is not fully proven. Tinnitus tends to be louder in times of stress and fatigue, and patients may benefit from meditation and yoga.       Recommendations:  Follow-up with ENT, as scheduled.  Repeat audiological evaluation as needed.

## 2024-08-19 NOTE — PROGRESS NOTES
[facility]   Physical Therapy Treatment Note + Plan of Care     Name: Sacha Fregoso  Clinic Number: [id number]    Therapy Diagnosis:   Encounter Diagnoses   Name Primary?    Patellofemoral pain syndrome of right knee Yes    Primary osteoarthritis of right knee     Right hip pain          Physician: John Ferguson MD     Physician Orders: PT Eval and Treat   Medical Diagnosis from Referral: Right hip pain [M25.551], Primary osteoarthritis of right knee [M17.11], Patellofemoral pain syndrome of right knee [M22.2X1]   Evaluation Date: 12/20/2023  Authorization Period Expiration: 4/12/24  Plan of Care Expiration: 8/10/24. 10/12/24  Progress Note Due: 8/22/24  Visit # / Visits authorized: 27/30    Time In: 1550 am  Time Out: 1604 am   Total Billable Time: 57 minutes  Billing reflects 1:1 direct supervision    MD follow-up:    Precautions: Standard    FOTO:  Goal: 72%  -Intake: 59%  -Status: 56%  -Discharge: incomplete    Subjective     Pt reports: 8/19- Knees and back are feeling better overall, still has some minor symptoms. He has been progressing his running well.    He was compliant with home exercise program.  Response to previous treatment: tolerated treatment well.   Functional change: improved ability to participate in high level ADL's in a limited fashion.     Pain: 7/10 at worst, 2/10 currently  Location: right knee     Objective      Objective Measures updated at progress report unless specified otherwise.    Primary Impairments:   Thigh strength  Dominant hamstring,   knee valgus,   knee lateral glide syndrome       Media Information            Observation:         MOTOR CONTROL TESTS:    Right  (spine) Left  Goal   Lower extremity      Active straight leg raise [x] Positive  [] Negative  Comments: increased pelvis motion [x] Positive  [] Negative  Comments: increased pelvis motion Negative B    Prone knee bend [] Positive  [x] Negative  Comments: [x] Positive  []  Negative  Comments: increased pelvis motion Negative B    Bent knee fallout [x] Positive  [] Negative  Comments: increased pelvis motion [x] Positive  [] Negative  Comments: increased pelvis motion Negative B    Supine march [x] Positive  [] Negative  Comments: increased pelvis motion [x] Positive  [] Negative  Comments: increased pelvis motion    Supine heel slide [] Positive  [x] Negative  Comments: [] Positive  [x] Negative  Comments:            RANGE OF MOTION:   Lumbar ROM Right  (spine) Left   Pain/Dysfunction with Movement Goal   Lumbar Flexion (60º) 100% --- Unable to reverse lower lumbar lordosis    Lumbar Extension (30º) 75% --- Lower lumbar jaron    Lumbar Side Bending (25º) 75% 75% Feels more muscle stretch with L SB    Lumbar Rotation 90% 90%           STRENGTH:   L/E MMT Right  (spine) Left Pain/Dysfunction with Movement Goal   Hip Extension  4/5 3+/5 Hamstring dominant L glute 4+/5 B            Joint mobility:  Increased mobility with AP assessment at L5-S1  Impaired R Lumbar sidebend in mid lumbar spine  Impaired rotation at TLJ      SENSATION  [x] Intact to Light Touch   [] Impaired:      PALPATION: Structures: Increased tenderness to palpation of:        Treatment     Sacha received the treatments listed below:      Code  (see below chart) Intervention Date/Notes  8/19   MT Manual Knee gapping  Lumbar gapping Gr5   TE Bike 5'    TE Dynamic stretch Lunge  HS  Quad   NR Glute activation biofeedback (emphasize core, glute, then HS activation)  x8  on each side   NR Abdominal cuff biofeedback Bracing x 5  Bent knee fallout x 10  March x 10   NR Dead bug ball 3 x 6 ea   NR Side plank hip abduction 3 x 10 lacy   NR Elevated bridge SL 30# 3 x 10   TE Leg press  NP   TE Knee extension tempo 1 x 8, 85#  2 x 8, 95#   TE HS curl machine 3 x 10     15 minutes of Therapeutic Exercise (TE) to develop strength, endurance, ROM, and flexibility. (40785)  08 minutes of Manual therapy (MT) to improve pain and ROM.  (13763)  34 minutes of Neuromuscular Re-Education (NMR)  to improve: Balance, Coordination, Kinesthetic, Sense, Proprioception, and Posture. (11875)  00 minutes of Therapeutic Activities (TA) to improve functional performance. (26181)  Unattended Electrical Stimulation (ES) for muscle performance and/or pain modulation. (30010)  Vasopneumatic Device Therapy () for management of swelling/edema. (90594)  BFR: Blood flow restriction applied during exercise  NP: Not Performed    * indicates that exercise was performed, not billed today since patient was not under direct one-on-one supervision      Patient Education and Home Exercises         Education provided:   Hip abductor loading progression  Adding specific exercises like ankle mobs, single leg squats, lunges with rotation before running to prime the movements    Written Home Exercises Provided: Patient instructed to cont prior HEP.  Exercises were reviewed and Sacha was able to demonstrate them prior to the end of the session.  Sacha demonstrated good  understanding of the education provided.     See EMR under Patient Instructions for exercises provided prior visit.    Assessment     8/19- The patient is progressing well with thigh strengthening exercise. Very good tolerance to progression of core stability exercise without significant symptoms. He would benefit from continued work on progressively loading core and LE strength.      Sacha Is progressing well towards his goals.   Pt prognosis is Good.     Pt will continue to benefit from skilled outpatient physical therapy to address the deficits listed in the problem list box on initial evaluation, provide pt/family education and to maximize pt's level of independence in the home and community environment.     Pt's spiritual, cultural and educational needs considered and pt agreeable to plan of care and goals.    Anticipated barriers to physical therapy: chronicity of condition     Goals:      Short Term Goals:  6 weeks Status  Date Met   PAIN: Pt will report worst pain of 2/10 in order to progress toward max functional ability and improve quality of life. [] Progressing  [x] Met  [] Not Met  3/18/24   FUNCTION: Patient will demonstrate improved function as indicated by a functional score improvement of at least 5 points on FOTO. [] Progressing  [x] Met  [] Not Met  3/18/24   MOBILITY: Patient will improve AROM to 50% of stated goals, listed in objective measures above, in order to progress towards independence with functional activities.  [] Progressing  [x] Met  [] Not Met  3/18/24   STRENGTH: Patient will improve strength to 50% of stated goals, listed in objective measures above, in order to progress towards independence with functional activities. [x] Progressing  [x] Met  [] Not Met  3/18/24   POSTURE: Patient will correct postural deviations in sitting and standing, to decrease pain and promote long term stability.  [] Progressing  [x] Met  [] Not Met  3/18/24   HEP: Patient will demonstrate independence with HEP in order to progress toward functional independence. [] Progressing  [x] Met  [] Not Met  3/18/24      Long Term Goals:  12 weeks Status Date Met   PAIN: Pt will report worst pain of 1/10 in order to progress toward max functional ability and improve quality of life [x] Progressing  [] Met  [] Not Met     FUNCTION: Patient will demonstrate improved function as indicated by a FOTO functional score improvement as listed in header. [x] Progressing  [] Met  [] Not Met     MOBILITY: Patient will improve AROM to stated goals, listed in objective measures above, in order to return to maximal functional potential and improve quality of life.  [x] Progressing  [x] Met  [] Not Met     STRENGTH: Patient will improve strength to stated goals, listed in objective measures above, in order to improve functional independence and quality of life.  [x] Progressing  [] Met  [] Not Met     GAIT: Patient  will demonstrate normalized gait mechanics with minimal compensation in order to return to PLOF. [x] Progressing  [x] Met  [] Not Met     Patient will return to normal ADL's, IADL's, community involvement, recreational activities, and work-related activities with less than or equal to 1/10 pain and maximal function.  [x] Progressing  [] Met  [] Not Met          Plan       Continue with abductor loading progression  Continued to address movement deficits      Jv Pedro, PT,

## 2024-08-19 NOTE — PATIENT INSTRUCTIONS
We reviewed his audiogram together in detail.  We also discussed that tinnitus is most often caused by a hearing loss, and that as the hair cells are damaged, either genetic or as a result of loud noise exposure, they then cause tinnitus.  Some patients find that restricting the salt or caffeine in their diet helps, and there is also an OTC supplement, lipoflavonoids, that some people find to be effective though their benefit is not fully proven. Arches is another supplement that some patients have seen improvement with.  Tinnitus tends to be louder in times of stress and fatigue, and may decrease with time.  Sound machines may also be an effective masking technique if needed at night.

## 2024-08-26 ENCOUNTER — CLINICAL SUPPORT (OUTPATIENT)
Facility: HOSPITAL | Age: 44
End: 2024-08-26
Payer: OTHER GOVERNMENT

## 2024-08-26 DIAGNOSIS — M25.551 RIGHT HIP PAIN: ICD-10-CM

## 2024-08-26 DIAGNOSIS — M22.2X1 PATELLOFEMORAL PAIN SYNDROME OF RIGHT KNEE: Primary | ICD-10-CM

## 2024-08-26 DIAGNOSIS — M17.11 PRIMARY OSTEOARTHRITIS OF RIGHT KNEE: ICD-10-CM

## 2024-08-26 PROCEDURE — 97110 THERAPEUTIC EXERCISES: CPT | Mod: PN | Performed by: PHYSICAL THERAPIST

## 2024-08-26 PROCEDURE — 97140 MANUAL THERAPY 1/> REGIONS: CPT | Mod: PN | Performed by: PHYSICAL THERAPIST

## 2024-08-26 NOTE — PROGRESS NOTES
[facility]   Physical Therapy Treatment Note + Plan of Care     Name: Sacha Fregoso  Clinic Number: [id number]    Therapy Diagnosis:   Encounter Diagnoses   Name Primary?    Patellofemoral pain syndrome of right knee Yes    Primary osteoarthritis of right knee     Right hip pain        Physician: oJhn Ferguson MD     Physician Orders: PT Eval and Treat   Medical Diagnosis from Referral: Right hip pain [M25.551], Primary osteoarthritis of right knee [M17.11], Patellofemoral pain syndrome of right knee [M22.2X1]   Evaluation Date: 12/20/2023  Authorization Period Expiration: 4/12/24  Plan of Care Expiration: 8/10/24. 10/12/24  Progress Note Due: 9/26/24  Visit # / Visits authorized: 28/30    Time In: 0802 am  Time Out: 0915 am   Total Billable Time: 37 minutes  Billing reflects 1:1 direct supervision    MD follow-up:    Precautions: Standard    FOTO:  Goal: 72%  -Intake: 59%  -Status: 56%  -Discharge: incomplete    Subjective     Pt reports: 8/26- Strained back putting his shoe on this weekend. Moreso on the R side.    He was compliant with home exercise program.  Response to previous treatment: tolerated treatment well.   Functional change: improved ability to participate in high level ADL's in a limited fashion.     Pain: 7/10 at worst, 2/10 currently  Location: right knee     Objective      Objective Measures updated at progress report unless specified otherwise.    Primary Impairments:   Thigh strength  Dominant hamstring,   knee valgus,   knee lateral glide syndrome       Media Information            Observation:         MOTOR CONTROL TESTS:    Right  (spine) Left  Goal   Lower extremity      Active straight leg raise [x] Positive  [] Negative  Comments: increased pelvis motion [x] Positive  [] Negative  Comments: increased pelvis motion Negative B    Prone knee bend [] Positive  [x] Negative  Comments: [x] Positive  [] Negative  Comments: increased pelvis motion Negative  B    Bent knee fallout [x] Positive  [] Negative  Comments: increased pelvis motion [x] Positive  [] Negative  Comments: increased pelvis motion Negative B    Supine march [x] Positive  [] Negative  Comments: increased pelvis motion [x] Positive  [] Negative  Comments: increased pelvis motion    Supine heel slide [] Positive  [x] Negative  Comments: [] Positive  [x] Negative  Comments:            RANGE OF MOTION:   Lumbar ROM Right  (spine) Left   Pain/Dysfunction with Movement Goal   Lumbar Flexion (60º) 100% --- Unable to reverse lower lumbar lordosis    Lumbar Extension (30º) 75% --- Lower lumbar jaron    Lumbar Side Bending (25º) 75% 75% Feels more muscle stretch with L SB    Lumbar Rotation 90% 90%     Shoulder flexion 170 165 R shrug/pain at end range    Shoulder   Pain    Shoulder IR 50            STRENGTH:   L/E MMT Right  (spine) Left Pain/Dysfunction with Movement Goal   Hip Extension  4/5 3+/5 Hamstring dominant L glute 4+/5 B   Shoulder scaption 5/5 5/5 R pain    Shoulder ER 5/5 5/5     Shoulder IR 5/5 5/5              Joint mobility:  Increased mobility with AP assessment at L5-S1  Impaired R Lumbar sidebend in mid lumbar spine  Impaired rotation at TLJ  Increased R GH anterior glide      SENSATION  [x] Intact to Light Touch   [] Impaired:      PALPATION: Structures: Increased tenderness to palpation of:        Treatment     Sacha received the treatments listed below:        Code  (see below chart) Intervention Date/Notes  8/26   TE Assessment 15 minutes   MT Manual Thoracic gapping     Lumbar gapping Gr5     GH posterior glide with cuff activation    TE Foam roller Thoracic extension   NR Serratus wall slide 3 x 10   NR* Prone T 3 x 10   NR* Prone Y 3 x 10   NR* Abdominal cuff biofeedback Bracing x 5  Bent knee fallout x 10  March x 10    NR* Bird dogs 10 x 10s   NR Glute activation biofeedback (emphasize core, glute, then HS activation)  x8  on each side   TE* Knee extension tempo 1 x 8,  85#  2 x 8, 95#   TE* HS curl machine 3 x 10   NR* Dead bug ball 3 x 6 ea     17 minutes of Therapeutic Exercise (TE) to develop strength, endurance, ROM, and flexibility. (95093)  15 minutes of Manual therapy (MT) to improve pain and ROM. (94446)  05 minutes of Neuromuscular Re-Education (NMR)  to improve: Balance, Coordination, Kinesthetic, Sense, Proprioception, and Posture. (60784)  00 minutes of Therapeutic Activities (TA) to improve functional performance. (97931)  Unattended Electrical Stimulation (ES) for muscle performance and/or pain modulation. (98796)  Vasopneumatic Device Therapy () for management of swelling/edema. (76075)  BFR: Blood flow restriction applied during exercise  NP: Not Performed    * indicates that exercise was performed, not billed today since patient was not under direct one-on-one supervision      Patient Education and Home Exercises         Education provided:   Hip abductor loading progression  Adding specific exercises like ankle mobs, single leg squats, lunges with rotation before running to prime the movements    Written Home Exercises Provided: Patient instructed to cont prior HEP.  Exercises were reviewed and Sacha was able to demonstrate them prior to the end of the session.  Sacha demonstrated good  understanding of the education provided.     See EMR under Patient Instructions for exercises provided prior visit.    Assessment     8/26- Patient continues to have motor control deficits in the lumbar spine. Improved symptoms and lumbar range of motion with manual therapy. I encouraged him to continue to work on lumbopelvic-hip complex stability exercise. He was noted to have anterior shoulder pain due to shoulder anterior glide and impaired overall upper quarter posture/movement. Improved tolerance to active range of motion after exercise.      Sacha Is progressing well towards his goals.   Pt prognosis is Good.     Pt will continue to benefit from skilled  outpatient physical therapy to address the deficits listed in the problem list box on initial evaluation, provide pt/family education and to maximize pt's level of independence in the home and community environment.     Pt's spiritual, cultural and educational needs considered and pt agreeable to plan of care and goals.    Anticipated barriers to physical therapy: chronicity of condition     Goals:     Short Term Goals:  6 weeks Status  Date Met   PAIN: Pt will report worst pain of 2/10 in order to progress toward max functional ability and improve quality of life. [] Progressing  [x] Met  [] Not Met  3/18/24   FUNCTION: Patient will demonstrate improved function as indicated by a functional score improvement of at least 5 points on FOTO. [] Progressing  [x] Met  [] Not Met  3/18/24   MOBILITY: Patient will improve AROM to 50% of stated goals, listed in objective measures above, in order to progress towards independence with functional activities.  [] Progressing  [x] Met  [] Not Met  3/18/24   STRENGTH: Patient will improve strength to 50% of stated goals, listed in objective measures above, in order to progress towards independence with functional activities. [x] Progressing  [x] Met  [] Not Met  3/18/24   POSTURE: Patient will correct postural deviations in sitting and standing, to decrease pain and promote long term stability.  [] Progressing  [x] Met  [] Not Met  3/18/24   HEP: Patient will demonstrate independence with HEP in order to progress toward functional independence. [] Progressing  [x] Met  [] Not Met  3/18/24      Long Term Goals:  12 weeks Status Date Met   PAIN: Pt will report worst pain of 1/10 in order to progress toward max functional ability and improve quality of life [x] Progressing  [] Met  [] Not Met     FUNCTION: Patient will demonstrate improved function as indicated by a FOTO functional score improvement as listed in header. [x] Progressing  [] Met  [] Not Met     MOBILITY: Patient will  improve AROM to stated goals, listed in objective measures above, in order to return to maximal functional potential and improve quality of life.  [x] Progressing  [x] Met  [] Not Met     STRENGTH: Patient will improve strength to stated goals, listed in objective measures above, in order to improve functional independence and quality of life.  [x] Progressing  [] Met  [] Not Met     GAIT: Patient will demonstrate normalized gait mechanics with minimal compensation in order to return to PLOF. [x] Progressing  [x] Met  [] Not Met     Patient will return to normal ADL's, IADL's, community involvement, recreational activities, and work-related activities with less than or equal to 1/10 pain and maximal function.  [x] Progressing  [] Met  [] Not Met          Plan       Continue with abductor loading progression  Continued to address movement deficits      Jv Pedro, PT,

## 2024-09-05 ENCOUNTER — CLINICAL SUPPORT (OUTPATIENT)
Facility: HOSPITAL | Age: 44
End: 2024-09-05
Payer: OTHER GOVERNMENT

## 2024-09-05 DIAGNOSIS — M17.11 PRIMARY OSTEOARTHRITIS OF RIGHT KNEE: ICD-10-CM

## 2024-09-05 DIAGNOSIS — M22.2X1 PATELLOFEMORAL PAIN SYNDROME OF RIGHT KNEE: Primary | ICD-10-CM

## 2024-09-05 DIAGNOSIS — M25.551 RIGHT HIP PAIN: ICD-10-CM

## 2024-09-05 PROCEDURE — 97140 MANUAL THERAPY 1/> REGIONS: CPT | Mod: PN | Performed by: PHYSICAL THERAPIST

## 2024-09-05 PROCEDURE — 97112 NEUROMUSCULAR REEDUCATION: CPT | Mod: PN | Performed by: PHYSICAL THERAPIST

## 2024-09-05 NOTE — PROGRESS NOTES
[facility]   Physical Therapy Treatment Note + Plan of Care     Name: Sacha Fregoso  Clinic Number: [id number]    Therapy Diagnosis:   Encounter Diagnoses   Name Primary?    Patellofemoral pain syndrome of right knee Yes    Primary osteoarthritis of right knee     Right hip pain        Physician: John Ferguson MD     Physician Orders: PT Eval and Treat   Medical Diagnosis from Referral: Right hip pain [M25.551], Primary osteoarthritis of right knee [M17.11], Patellofemoral pain syndrome of right knee [M22.2X1]   Evaluation Date: 12/20/2023  Authorization Period Expiration: 4/12/24  Plan of Care Expiration: 8/10/24. 10/12/24  Progress Note Due: 9/26/24  Visit # / Visits authorized: 29/45    Time In: 0725 am  Time Out: 0845 am   Total Billable Time: 40 minutes  Billing reflects 1:1 direct supervision    MD follow-up:    Precautions: Standard    FOTO:  Goal: 72%  -Intake: 59%  -Status: 56%  -Discharge: incomplete    Subjective     Pt reports: 9/5- He continues to have some aches and pains in the knees, back, and shoulder    He was compliant with home exercise program.  Response to previous treatment: tolerated treatment well.   Functional change: improved ability to participate in high level ADL's in a limited fashion.     Pain: 7/10 at worst, 2/10 currently  Location: right knee     Objective      Objective Measures updated at progress report unless specified otherwise.    Primary Impairments:   Thigh strength  Dominant hamstring,   knee valgus,   knee lateral glide syndrome     Cervical clearing tests:  Alar ligament- negative  Transverse ligament- negative  Vertebral artery- negative testing and negative history of risk factors  Brendan fracture- negative      Treatment     Sacha received the treatments listed below:      Code  (see below chart) Intervention Date/Notes  9/5   MT Manual Thoracic gapping     Lumbar gapping     R C1-2, C2-3 gapping Gr5     DN R UT   TE*  Bike 5'   NR Chin tuck with cuff biofeedback 10 x 10s to 24 mmHg   NR Prone T 3 x 10   NR Prone Y 3 x 10   NR* Serratus wall slide 3 x 10   NR Assisted curl up 3 x 8   NR* Reverse hyper 3 x 8     Dry needling was performed to upper trapezius to decrease inflammation, increase circulation, decrease pain and restore homeostasis. All needles were removed and changes in signs and symptoms were noted.  Patient tolerated treatment well without any adverse effects. Dry needling consent form was reviewed with the patient addressing all questions and concerns and signed by patient.  Copy of the consent form was provided to patient and copy of consent form scanned to patient Epic chart.        00 minutes of Therapeutic Exercise (TE) to develop strength, endurance, ROM, and flexibility. (36631)  15 minutes of Manual therapy (MT) to improve pain and ROM. (37116)  25 minutes of Neuromuscular Re-Education (NMR)  to improve: Balance, Coordination, Kinesthetic, Sense, Proprioception, and Posture. (72242)  00 minutes of Therapeutic Activities (TA) to improve functional performance. (74408)  Unattended Electrical Stimulation (ES) for muscle performance and/or pain modulation. (58556)  Vasopneumatic Device Therapy () for management of swelling/edema. (34356)  BFR: Blood flow restriction applied during exercise  NP: Not Performed    * indicates that exercise was performed, not billed today since patient was not under direct one-on-one supervision      Patient Education and Home Exercises         Education provided:   Hip abductor loading progression  Adding specific exercises like ankle mobs, single leg squats, lunges with rotation before running to prime the movements    Written Home Exercises Provided: Patient instructed to cont prior HEP.  Exercises were reviewed and Sacha was able to demonstrate them prior to the end of the session.  Sacha demonstrated good  understanding of the education provided.     See EMR under  Patient Instructions for exercises provided prior visit.    Assessment     8/26- Patient continues to have motor control deficits in the lumbar spine. Improved symptoms and lumbar range of motion with manual therapy. I encouraged him to continue to work on lumbopelvic-hip complex stability exercise. He was noted to have anterior shoulder pain due to shoulder anterior glide and impaired overall upper quarter posture/movement. Also with myofascial pain of the upper trapezius that was improved with dry needling.      Sacha Is progressing well towards his goals.   Pt prognosis is Good.     Pt will continue to benefit from skilled outpatient physical therapy to address the deficits listed in the problem list box on initial evaluation, provide pt/family education and to maximize pt's level of independence in the home and community environment.     Pt's spiritual, cultural and educational needs considered and pt agreeable to plan of care and goals.    Anticipated barriers to physical therapy: chronicity of condition     Goals:     Short Term Goals:  6 weeks Status  Date Met   PAIN: Pt will report worst pain of 2/10 in order to progress toward max functional ability and improve quality of life. [] Progressing  [x] Met  [] Not Met  3/18/24   FUNCTION: Patient will demonstrate improved function as indicated by a functional score improvement of at least 5 points on FOTO. [] Progressing  [x] Met  [] Not Met  3/18/24   MOBILITY: Patient will improve AROM to 50% of stated goals, listed in objective measures above, in order to progress towards independence with functional activities.  [] Progressing  [x] Met  [] Not Met  3/18/24   STRENGTH: Patient will improve strength to 50% of stated goals, listed in objective measures above, in order to progress towards independence with functional activities. [x] Progressing  [x] Met  [] Not Met  3/18/24   POSTURE: Patient will correct postural deviations in sitting and standing, to  decrease pain and promote long term stability.  [] Progressing  [x] Met  [] Not Met  3/18/24   HEP: Patient will demonstrate independence with HEP in order to progress toward functional independence. [] Progressing  [x] Met  [] Not Met  3/18/24      Long Term Goals:  12 weeks Status Date Met   PAIN: Pt will report worst pain of 1/10 in order to progress toward max functional ability and improve quality of life [x] Progressing  [] Met  [] Not Met     FUNCTION: Patient will demonstrate improved function as indicated by a FOTO functional score improvement as listed in header. [x] Progressing  [] Met  [] Not Met     MOBILITY: Patient will improve AROM to stated goals, listed in objective measures above, in order to return to maximal functional potential and improve quality of life.  [x] Progressing  [x] Met  [] Not Met     STRENGTH: Patient will improve strength to stated goals, listed in objective measures above, in order to improve functional independence and quality of life.  [x] Progressing  [] Met  [] Not Met     GAIT: Patient will demonstrate normalized gait mechanics with minimal compensation in order to return to PLOF. [x] Progressing  [x] Met  [] Not Met     Patient will return to normal ADL's, IADL's, community involvement, recreational activities, and work-related activities with less than or equal to 1/10 pain and maximal function.  [x] Progressing  [] Met  [] Not Met          Plan       Continue with abductor loading progression  Continued to address movement deficits      Jv Pedro, PT,

## 2024-09-09 ENCOUNTER — CLINICAL SUPPORT (OUTPATIENT)
Facility: HOSPITAL | Age: 44
End: 2024-09-09
Payer: OTHER GOVERNMENT

## 2024-09-09 DIAGNOSIS — M22.2X1 PATELLOFEMORAL PAIN SYNDROME OF RIGHT KNEE: Primary | ICD-10-CM

## 2024-09-09 DIAGNOSIS — M25.551 RIGHT HIP PAIN: ICD-10-CM

## 2024-09-09 DIAGNOSIS — M17.11 PRIMARY OSTEOARTHRITIS OF RIGHT KNEE: ICD-10-CM

## 2024-09-09 PROCEDURE — 97140 MANUAL THERAPY 1/> REGIONS: CPT | Mod: PN | Performed by: PHYSICAL THERAPIST

## 2024-09-09 PROCEDURE — 97112 NEUROMUSCULAR REEDUCATION: CPT | Mod: PN | Performed by: PHYSICAL THERAPIST

## 2024-09-11 NOTE — PROGRESS NOTES
[facility]   Physical Therapy Treatment Note + Plan of Care     Name: Sacha Fregoso  Clinic Number: [id number]    Therapy Diagnosis:   Encounter Diagnoses   Name Primary?    Patellofemoral pain syndrome of right knee Yes    Primary osteoarthritis of right knee     Right hip pain        Physician: John Ferguson MD     Physician Orders: PT Eval and Treat   Medical Diagnosis from Referral: Right hip pain [M25.551], Primary osteoarthritis of right knee [M17.11], Patellofemoral pain syndrome of right knee [M22.2X1]   Evaluation Date: 12/20/2023  Authorization Period Expiration: 4/12/24  Plan of Care Expiration: 8/10/24. 10/12/24  Progress Note Due: 9/26/24  Visit # / Visits authorized: 30/45    Time In: 0855 am  Time Out: 0915 am   Total Billable Time: 39 minutes  Billing reflects 1:1 direct supervision    MD follow-up:    Precautions: Standard    FOTO:  Goal: 72%  -Intake: 59%  -Status: 56%  -Discharge: incomplete    Subjective     Pt reports: 9/9- Shoulder felt sore after DN last visit, took about a day to get back to baseline. Still having some stiffness. He ran 7 miles on Saturday and 3 miles on Sunday and has some soreness in the knees.    He was compliant with home exercise program.  Response to previous treatment: tolerated treatment well.   Functional change: improved ability to participate in high level ADL's in a limited fashion.     Pain: 7/10 at worst, 2/10 currently  Location: right knee     Objective      Objective Measures updated at progress report unless specified otherwise.    Primary Impairments:   Thigh strength  Dominant hamstring,   knee valgus,   knee lateral glide syndrome     Cervical clearing tests:  Alar ligament- negative  Transverse ligament- negative  Vertebral artery- negative testing and negative history of risk factors  Brendan fracture- negative      Treatment     Sacha received the treatments listed below:      Code  (see below chart)  Intervention Date/Notes  9/9   MT Manual Thoracic gapping        R C1-2, C2-3 gapping Gr5         TE* Bike 5'   NR Chin tuck with cuff biofeedback 10 x 10s to 24 mmHg     + arm elevation x 15 ea   NR Prone W to Y 3 x 10   NR* KB OH press - bottoms  10# 3 x 10   NR Assisted curl up 3 x 8   NR Reverse hyper 3 x 8   TE* Knee extension tempo 95# 3 x 10   TE* HS curl machine 3 x 10     00 minutes of Therapeutic Exercise (TE) to develop strength, endurance, ROM, and flexibility. (02869)  15 minutes of Manual therapy (MT) to improve pain and ROM. (70296)  24 minutes of Neuromuscular Re-Education (NMR)  to improve: Balance, Coordination, Kinesthetic, Sense, Proprioception, and Posture. (89657)  00 minutes of Therapeutic Activities (TA) to improve functional performance. (94031)  Unattended Electrical Stimulation (ES) for muscle performance and/or pain modulation. (46798)  Vasopneumatic Device Therapy () for management of swelling/edema. (00058)  BFR: Blood flow restriction applied during exercise  NP: Not Performed    * indicates that exercise was performed, not billed today since patient was not under direct one-on-one supervision      Patient Education and Home Exercises         Education provided:   Hip abductor loading progression  Adding specific exercises like ankle mobs, single leg squats, lunges with rotation before running to prime the movements    Written Home Exercises Provided: Patient instructed to cont prior HEP.  Exercises were reviewed and Sacha was able to demonstrate them prior to the end of the session.  Sacha demonstrated good  understanding of the education provided.     See EMR under Patient Instructions for exercises provided prior visit.    Assessment     9/9 the patient continues to have some upper quarter postural deficits as well as spine dysfunction that seems to contribute to neck and shoulder symptoms. He had improved cervical range of motion with manual therapy. A good  reinforcement of movement with motor control exercises.      Sacha Is progressing well towards his goals.   Pt prognosis is Good.     Pt will continue to benefit from skilled outpatient physical therapy to address the deficits listed in the problem list box on initial evaluation, provide pt/family education and to maximize pt's level of independence in the home and community environment.     Pt's spiritual, cultural and educational needs considered and pt agreeable to plan of care and goals.    Anticipated barriers to physical therapy: chronicity of condition     Goals:     Short Term Goals:  6 weeks Status  Date Met   PAIN: Pt will report worst pain of 2/10 in order to progress toward max functional ability and improve quality of life. [] Progressing  [x] Met  [] Not Met  3/18/24   FUNCTION: Patient will demonstrate improved function as indicated by a functional score improvement of at least 5 points on FOTO. [] Progressing  [x] Met  [] Not Met  3/18/24   MOBILITY: Patient will improve AROM to 50% of stated goals, listed in objective measures above, in order to progress towards independence with functional activities.  [] Progressing  [x] Met  [] Not Met  3/18/24   STRENGTH: Patient will improve strength to 50% of stated goals, listed in objective measures above, in order to progress towards independence with functional activities. [x] Progressing  [x] Met  [] Not Met  3/18/24   POSTURE: Patient will correct postural deviations in sitting and standing, to decrease pain and promote long term stability.  [] Progressing  [x] Met  [] Not Met  3/18/24   HEP: Patient will demonstrate independence with HEP in order to progress toward functional independence. [] Progressing  [x] Met  [] Not Met  3/18/24      Long Term Goals:  12 weeks Status Date Met   PAIN: Pt will report worst pain of 1/10 in order to progress toward max functional ability and improve quality of life [x] Progressing  [] Met  [] Not Met     FUNCTION:  Patient will demonstrate improved function as indicated by a FOTO functional score improvement as listed in header. [x] Progressing  [] Met  [] Not Met     MOBILITY: Patient will improve AROM to stated goals, listed in objective measures above, in order to return to maximal functional potential and improve quality of life.  [x] Progressing  [x] Met  [] Not Met     STRENGTH: Patient will improve strength to stated goals, listed in objective measures above, in order to improve functional independence and quality of life.  [x] Progressing  [] Met  [] Not Met     GAIT: Patient will demonstrate normalized gait mechanics with minimal compensation in order to return to PLOF. [x] Progressing  [x] Met  [] Not Met     Patient will return to normal ADL's, IADL's, community involvement, recreational activities, and work-related activities with less than or equal to 1/10 pain and maximal function.  [x] Progressing  [] Met  [] Not Met          Plan       Continue with abductor loading progression  Continued to address movement deficits      Jv Pedro PT,

## 2024-09-16 ENCOUNTER — CLINICAL SUPPORT (OUTPATIENT)
Facility: HOSPITAL | Age: 44
End: 2024-09-16
Payer: OTHER GOVERNMENT

## 2024-09-16 DIAGNOSIS — M17.11 PRIMARY OSTEOARTHRITIS OF RIGHT KNEE: ICD-10-CM

## 2024-09-16 DIAGNOSIS — M22.2X1 PATELLOFEMORAL PAIN SYNDROME OF RIGHT KNEE: Primary | ICD-10-CM

## 2024-09-16 DIAGNOSIS — M25.551 RIGHT HIP PAIN: ICD-10-CM

## 2024-09-16 PROCEDURE — 97140 MANUAL THERAPY 1/> REGIONS: CPT | Mod: PN | Performed by: PHYSICAL THERAPIST

## 2024-09-16 PROCEDURE — 97112 NEUROMUSCULAR REEDUCATION: CPT | Mod: PN | Performed by: PHYSICAL THERAPIST
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
## 2024-09-27 ENCOUNTER — TELEPHONE (OUTPATIENT)
Dept: PSYCHIATRY | Facility: CLINIC | Age: 44
End: 2024-09-27
Payer: OTHER GOVERNMENT

## 2024-09-27 ENCOUNTER — CLINICAL SUPPORT (OUTPATIENT)
Facility: HOSPITAL | Age: 44
End: 2024-09-27
Payer: OTHER GOVERNMENT

## 2024-09-27 DIAGNOSIS — M25.551 RIGHT HIP PAIN: ICD-10-CM

## 2024-09-27 DIAGNOSIS — M22.2X1 PATELLOFEMORAL PAIN SYNDROME OF RIGHT KNEE: Primary | ICD-10-CM

## 2024-09-27 DIAGNOSIS — M17.11 PRIMARY OSTEOARTHRITIS OF RIGHT KNEE: ICD-10-CM

## 2024-09-27 PROCEDURE — 97140 MANUAL THERAPY 1/> REGIONS: CPT | Mod: PN | Performed by: PHYSICAL THERAPIST

## 2024-09-27 PROCEDURE — 97112 NEUROMUSCULAR REEDUCATION: CPT | Mod: PN | Performed by: PHYSICAL THERAPIST

## 2024-09-29 NOTE — PROGRESS NOTES
[facility]   Physical Therapy Treatment Note + Plan of Care     Name: Sacha Fregoso  Clinic Number: [id number]    Therapy Diagnosis:   Encounter Diagnoses   Name Primary?    Patellofemoral pain syndrome of right knee Yes    Primary osteoarthritis of right knee     Right hip pain        Physician: John Ferguson MD     Physician Orders: PT Eval and Treat   Medical Diagnosis from Referral: Right hip pain [M25.551], Primary osteoarthritis of right knee [M17.11], Patellofemoral pain syndrome of right knee [M22.2X1]   Evaluation Date: 12/20/2023  Authorization Period Expiration: 4/12/24  Plan of Care Expiration: 8/10/24. 10/12/24  Progress Note Due: 9/26/24  Visit # / Visits authorized: 32/45    Time In: 0825 am  Time Out: 0940 am   Total Billable Time: 28 minutes  Billing reflects 1:1 direct supervision    MD follow-up:    Precautions: Standard    FOTO:  Goal: 72%  -Intake: 59%  -Status: 56%  -Discharge: incomplete    Subjective     Pt reports: 9/27- Not as much pain as last visit. He continues to notice some soreness and clicking in the ankle    He was compliant with home exercise program.  Response to previous treatment: tolerated treatment well.   Functional change: improved ability to participate in high level ADL's in a limited fashion.     Pain: 7/10 at worst, 2/10 currently  Location: right knee     Objective      Objective Measures updated at progress report unless specified otherwise.    Primary Impairments:   Thigh strength  Dominant hamstring,   knee valgus,   knee lateral glide syndrome     Cervical clearing tests:  Alar ligament- negative  Transverse ligament- negative  Vertebral artery- negative testing and negative history of risk factors  Brendan fracture- negative      Treatment     Sacha received the treatments listed below:      Code  (see below chart) Intervention Date/Notes  9/27   MT Manual Thoracic gapping     Lumbar gapping     CTJ gapping prone     L  "ankle manipulation   TE Ankle mobs BPB 20x   NR Short foot    15 x 5s   NR* Lateral step down neutral foot Paper under foot, 6" 3 x 10   NR* Chin tuck with cuff biofeedback 10 x 10s to 26 mmHg   NR* Prone W to Y 3 x 10   NR* KB OH press - bottoms up 10# 3 x 10   NR* Assisted curl up 3 x 8   NR* Side plank full with band horiz abd 3 x 10 ea   TE* Knee extension tempo 100# 3 x 10   TE* HS curl machine 110# 3 x 10     15 minutes of Manual therapy (MT) to improve pain and ROM. (25235)  04 minutes of Therapeutic Exercise (TE) to develop strength, endurance, ROM, and flexibility. (42037)  04 minutes of Neuromuscular Re-Education (NMR)  to improve: Balance, Coordination, Kinesthetic, Sense, Proprioception, and Posture. (90007)  00 minutes of Therapeutic Activities (TA) to improve functional performance. (31215)  Unattended Electrical Stimulation (ES) for muscle performance and/or pain modulation. (76555)  Vasopneumatic Device Therapy () for management of swelling/edema. (71596)  BFR: Blood flow restriction applied during exercise  NP: Not Performed    * indicates that exercise was performed, not billed today since patient was not under direct one-on-one supervision      Patient Education and Home Exercises         Education provided:   Hip abductor loading progression  Adding specific exercises like ankle mobs, single leg squats, lunges with rotation before running to prime the movements    Written Home Exercises Provided: Patient instructed to cont prior HEP.  Exercises were reviewed and Sacha was able to demonstrate them prior to the end of the session.  Sacha demonstrated good  understanding of the education provided.     See EMR under Patient Instructions for exercises provided prior visit.    Assessment     9/27- Patient noted to have ankle stiffness and impaired pronation control which likely contributes to ankle symptoms. Good tolerance to new exercises and progression of motor control/strength exercises " today.      Sacha Is progressing well towards his goals.   Pt prognosis is Good.     Pt will continue to benefit from skilled outpatient physical therapy to address the deficits listed in the problem list box on initial evaluation, provide pt/family education and to maximize pt's level of independence in the home and community environment.     Pt's spiritual, cultural and educational needs considered and pt agreeable to plan of care and goals.    Anticipated barriers to physical therapy: chronicity of condition     Goals:     Short Term Goals:  6 weeks Status  Date Met   PAIN: Pt will report worst pain of 2/10 in order to progress toward max functional ability and improve quality of life. [] Progressing  [x] Met  [] Not Met  3/18/24   FUNCTION: Patient will demonstrate improved function as indicated by a functional score improvement of at least 5 points on FOTO. [] Progressing  [x] Met  [] Not Met  3/18/24   MOBILITY: Patient will improve AROM to 50% of stated goals, listed in objective measures above, in order to progress towards independence with functional activities.  [] Progressing  [x] Met  [] Not Met  3/18/24   STRENGTH: Patient will improve strength to 50% of stated goals, listed in objective measures above, in order to progress towards independence with functional activities. [x] Progressing  [x] Met  [] Not Met  3/18/24   POSTURE: Patient will correct postural deviations in sitting and standing, to decrease pain and promote long term stability.  [] Progressing  [x] Met  [] Not Met  3/18/24   HEP: Patient will demonstrate independence with HEP in order to progress toward functional independence. [] Progressing  [x] Met  [] Not Met  3/18/24      Long Term Goals:  12 weeks Status Date Met   PAIN: Pt will report worst pain of 1/10 in order to progress toward max functional ability and improve quality of life [x] Progressing  [] Met  [] Not Met     FUNCTION: Patient will demonstrate improved function as  indicated by a FOTO functional score improvement as listed in header. [x] Progressing  [] Met  [] Not Met     MOBILITY: Patient will improve AROM to stated goals, listed in objective measures above, in order to return to maximal functional potential and improve quality of life.  [x] Progressing  [x] Met  [] Not Met     STRENGTH: Patient will improve strength to stated goals, listed in objective measures above, in order to improve functional independence and quality of life.  [x] Progressing  [] Met  [] Not Met     GAIT: Patient will demonstrate normalized gait mechanics with minimal compensation in order to return to PLOF. [x] Progressing  [x] Met  [] Not Met     Patient will return to normal ADL's, IADL's, community involvement, recreational activities, and work-related activities with less than or equal to 1/10 pain and maximal function.  [x] Progressing  [] Met  [] Not Met          Plan     Continue to address impairments      Jv Pedro PT,

## 2024-10-01 ENCOUNTER — EVALUATION (OUTPATIENT)
Dept: PSYCHIATRY | Facility: CLINIC | Age: 44
End: 2024-10-01
Payer: OTHER GOVERNMENT

## 2024-10-01 DIAGNOSIS — F43.10 PTSD (POST-TRAUMATIC STRESS DISORDER): Primary | ICD-10-CM

## 2024-10-01 DIAGNOSIS — F43.22 ADJUSTMENT DISORDER WITH ANXIOUS MOOD: ICD-10-CM

## 2024-10-01 PROCEDURE — 99213 OFFICE O/P EST LOW 20 MIN: CPT | Mod: PBBFAC | Performed by: STUDENT IN AN ORGANIZED HEALTH CARE EDUCATION/TRAINING PROGRAM

## 2024-10-01 PROCEDURE — 90791 PSYCH DIAGNOSTIC EVALUATION: CPT | Mod: ,,, | Performed by: STUDENT IN AN ORGANIZED HEALTH CARE EDUCATION/TRAINING PROGRAM

## 2024-10-01 PROCEDURE — 99999 PR PBB SHADOW E&M-EST. PATIENT-LVL III: CPT | Mod: PBBFAC,,, | Performed by: STUDENT IN AN ORGANIZED HEALTH CARE EDUCATION/TRAINING PROGRAM
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
## 2024-10-03 ENCOUNTER — PATIENT MESSAGE (OUTPATIENT)
Dept: PSYCHIATRY | Facility: CLINIC | Age: 44
End: 2024-10-03
Payer: OTHER GOVERNMENT

## 2024-10-07 ENCOUNTER — TELEPHONE (OUTPATIENT)
Dept: PSYCHIATRY | Facility: CLINIC | Age: 44
End: 2024-10-07
Payer: OTHER GOVERNMENT

## 2024-10-07 NOTE — TELEPHONE ENCOUNTER
----- Message from Elieser sent at 10/7/2024 11:57 AM CDT -----  Contact: radha Montgomery is requesting a call back in regards to getting an appointment scheduled. Please give him a call back at [phone]

## 2024-11-13 ENCOUNTER — HOSPITAL ENCOUNTER (OUTPATIENT)
Dept: RADIOLOGY | Facility: HOSPITAL | Age: 44
Discharge: HOME | End: 2024-11-13
Attending: FAMILY MEDICINE
Payer: OTHER GOVERNMENT

## 2024-11-13 ENCOUNTER — OFFICE VISIT (OUTPATIENT)
Dept: PRIMARY CARE CLINIC | Facility: CLINIC | Age: 44
End: 2024-11-13
Payer: OTHER GOVERNMENT

## 2024-11-13 VITALS
TEMPERATURE: 97 F | HEIGHT: 71 IN | WEIGHT: 180 LBS | SYSTOLIC BLOOD PRESSURE: 116 MMHG | DIASTOLIC BLOOD PRESSURE: 80 MMHG | HEART RATE: 77 BPM | RESPIRATION RATE: 18 BRPM | BODY MASS INDEX: 25.2 KG/M2 | OXYGEN SATURATION: 97 %

## 2024-11-13 DIAGNOSIS — M25.511 CHRONIC RIGHT SHOULDER PAIN: ICD-10-CM

## 2024-11-13 DIAGNOSIS — M25.532 PAIN IN BOTH WRISTS: ICD-10-CM

## 2024-11-13 DIAGNOSIS — M25.531 PAIN IN BOTH WRISTS: ICD-10-CM

## 2024-11-13 DIAGNOSIS — M25.511 CHRONIC RIGHT SHOULDER PAIN: Primary | ICD-10-CM

## 2024-11-13 DIAGNOSIS — R20.0 BILATERAL HAND NUMBNESS: ICD-10-CM

## 2024-11-13 DIAGNOSIS — M54.2 NECK PAIN: ICD-10-CM

## 2024-11-13 DIAGNOSIS — G47.00 INSOMNIA, UNSPECIFIED TYPE: ICD-10-CM

## 2024-11-13 DIAGNOSIS — M77.11 LATERAL EPICONDYLITIS, RIGHT ELBOW: ICD-10-CM

## 2024-11-13 DIAGNOSIS — G25.81 RESTLESS LEGS: ICD-10-CM

## 2024-11-13 DIAGNOSIS — G89.29 CHRONIC RIGHT SHOULDER PAIN: ICD-10-CM

## 2024-11-13 DIAGNOSIS — S86.899A ANTERIOR SHIN SPLINTS: ICD-10-CM

## 2024-11-13 DIAGNOSIS — F43.10 PTSD (POST-TRAUMATIC STRESS DISORDER): ICD-10-CM

## 2024-11-13 DIAGNOSIS — G89.29 CHRONIC RIGHT SHOULDER PAIN: Primary | ICD-10-CM

## 2024-11-13 DIAGNOSIS — Z00.00 ROUTINE GENERAL MEDICAL EXAMINATION AT A HEALTH CARE FACILITY: ICD-10-CM

## 2024-11-13 PROCEDURE — 72040 X-RAY EXAM NECK SPINE 2-3 VW: CPT | Mod: 26,,, | Performed by: RADIOLOGY

## 2024-11-13 PROCEDURE — 73110 X-RAY EXAM OF WRIST: CPT | Mod: TC,50,PN

## 2024-11-13 PROCEDURE — 99999 PR PBB SHADOW E&M-EST. PATIENT-LVL V: CPT | Mod: PBBFAC,,, | Performed by: FAMILY MEDICINE

## 2024-11-13 PROCEDURE — 72040 X-RAY EXAM NECK SPINE 2-3 VW: CPT | Mod: TC,PN

## 2024-11-13 PROCEDURE — 73110 X-RAY EXAM OF WRIST: CPT | Mod: 26,50,, | Performed by: RADIOLOGY

## 2024-11-13 PROCEDURE — 99215 OFFICE O/P EST HI 40 MIN: CPT | Mod: PBBFAC,25,PN | Performed by: FAMILY MEDICINE

## 2024-11-13 PROCEDURE — 73030 X-RAY EXAM OF SHOULDER: CPT | Mod: TC,PN,RT

## 2024-11-13 PROCEDURE — 73030 X-RAY EXAM OF SHOULDER: CPT | Mod: 26,RT,, | Performed by: RADIOLOGY

## 2024-11-13 RX ORDER — IBUPROFEN 200 MG
200 TABLET ORAL EVERY 6 HOURS PRN
COMMUNITY

## 2024-11-13 NOTE — PROGRESS NOTES
Chief Complaint  No chief complaint on file.      HPI  Sacha Fregoso is a 44 y.o. male with multiple medical diagnoses as listed in the medical history and problem list that presents for  in person visit.     History of Present Illness    CHIEF COMPLAINT:  - Patient presents with multiple joint pain, including shoulder, neck, wrist, elbow, and shin splints, as well as emotional struggles related to upcoming jail from the .    HPI:  Patient reports multiple joint pain and emotional challenges, feeling emotionally and physically restricted due to recent medical issues. Joint pain has increased over time, affecting various parts of his body.    He has shoulder pain, particularly with arm elevation within normal range. His right shoulder is positioned lower than his left, potentially related to posture issues identified during physical therapy. The pain worsens when holding objects.    Patient has neck stiffness and elbow tenderness, impacting his ability to shake hands. He has wrist pain and occasional finger numbness when sitting at a desk, which typically resolves with movement.    Shin splints have developed from attempting to resume running. Running previously provided emotional benefits, but joint pain has limited his running distance and frequency. He typically applies ice to his shins post-running, providing some relief the following day.    Knee pain has shifted from the right to the left since initiating physical therapy. He also has left ankle pain, which his physical therapist attributed to a mobility issue.    A recent sleep study ruled out sleep apnea but identified insomnia related to stress. Insufficient sleep may be contributing to his physical and mental state.    Patient was evaluated by Ashely Alcazar for an initial assessment and diagnosed with PTSD. He is being referred to a , Kaz Flannery, for further mental health support.    Patient has exhausted his approved  "physical therapy visits, limiting his ability to continue treatment for his various joint issues.    PMH:  - PTSD: Diagnosed recently by Ashely Alcazar.  Insomnia: Related to stress.    SOCIAL HISTORY:  - Occupation: Currently employed in , retiring in May  -  History:  of 25 years, currently in process of retiring         No questionnaires on file.       Pmh, Psh, Family Hx, Social Hx, HM updated in Epic Tabs today.    Review of Systems     Objective:     Vitals:    11/13/24 1335   BP: 116/80   BP Location: Left arm   Patient Position: Sitting   Pulse: 77   Resp: 18   Temp: 97 °F (36.1 °C)   TempSrc: Tympanic   SpO2: 97%   Weight: 81.6 kg (180 lb)   Height: 5' 11" (1.803 m)     Wt Readings from Last 10 Encounters:   11/13/24 81.6 kg (180 lb)   08/19/24 81.6 kg (180 lb)   07/24/24 81.6 kg (180 lb)   07/03/24 85.3 kg (188 lb 2.6 oz)   07/03/24 84.1 kg (185 lb 6.5 oz)   05/20/24 81.1 kg (178 lb 12.7 oz)   01/08/24 80.2 kg (176 lb 12.9 oz)   12/27/23 80.2 kg (176 lb 12.9 oz)   11/30/23 81.8 kg (180 lb 5.4 oz)   12/19/22 81.3 kg (179 lb 2 oz)     Physical Exam    TEST RESULTS:  - Inflammatory markers: 2021, normal results.  Rheumatoid factors: 2021, normal results.  Cholesterol panel: February 2023, normal results.  Comprehensive metabolic panel: February 2023, normal results.  - Sleep study: No sleep apnea detected, insomnia related to stress noted.  EMG (wrist): Normal study.       Physical Exam  HENT:      Head: Normocephalic and atraumatic.   Eyes:      Conjunctiva/sclera: Conjunctivae normal.   Musculoskeletal:      Right shoulder: Tenderness present. Decreased range of motion.      Right upper arm: Normal.      Right elbow: Tenderness present in lateral epicondyle.      Right wrist: Tenderness present. Decreased range of motion.      Left wrist: Tenderness present. Decreased range of motion.      Cervical back: Spasms, tenderness and bony tenderness present. Decreased range of motion.      " Right lower leg: Tenderness present.      Left lower leg: Tenderness present.      Right ankle: Tenderness present.      Left ankle: Tenderness present.   Neurological:      General: No focal deficit present.      Mental Status: He is alert and oriented to person, place, and time. Mental status is at baseline.      Cranial Nerves: No cranial nerve deficit.      Sensory: Sensation is intact.      Motor: Weakness present.   Psychiatric:         Mood and Affect: Mood normal.         Behavior: Behavior normal.         Thought Content: Thought content normal.         Judgment: Judgment normal.         Assessment:     1. Chronic right shoulder pain    2. Pain in both wrists    3. Neck pain    4. Bilateral hand numbness    5. Lateral epicondylitis, right elbow    6. Anterior shin splints    7. Insomnia, unspecified type    8. Restless legs    9. PTSD (post-traumatic stress disorder)    10. Routine general medical examination at a health care facility        LABS:   Lab Results   Component Value Date    HGBA1C 4.8 02/14/2024    HGBA1C 4.8 12/19/2022    HGBA1C 4.8 09/13/2021      Lab Results   Component Value Date    CHOL 161 02/14/2024    CHOL 214 (H) 12/19/2022    CHOL 153 09/13/2021     Lab Results   Component Value Date    LDLCALC 101.8 02/14/2024    LDLCALC 137.8 12/19/2022    LDLCALC 89.4 09/13/2021     Lab Results   Component Value Date    WBC 5.13 02/14/2024    HGB 14.5 02/14/2024    HCT 42.9 02/14/2024     02/14/2024    CHOL 161 02/14/2024    TRIG 56 02/14/2024    HDL 48 02/14/2024    ALT 21 02/14/2024    AST 23 02/14/2024     02/14/2024    K 4.7 02/14/2024     02/14/2024    CREATININE 0.9 02/14/2024    BUN 18 02/14/2024    CO2 24 02/14/2024    TSH 3.124 02/14/2024    PSA 0.57 01/08/2024    HGBA1C 4.8 02/14/2024       Plan:   Diagnoses and all orders for this visit:    Chronic right shoulder pain  -     X-ray Shoulder 2 or More Views Right; Future  -     Ambulatory referral/consult to Orthopedics;  Future  -     Ambulatory referral/consult to Physical/Occupational Therapy; Future    Pain in both wrists  -     Ambulatory referral/consult to Orthopedics; Future  -     X-Ray Wrist Complete Bilateral; Future    Neck pain  -     X-Ray Cervical Spine 2 or 3 Views; Future  -     Ambulatory referral/consult to Orthopedics; Future  -     Ambulatory referral/consult to Physical/Occupational Therapy; Future    Bilateral hand numbness  -     Ambulatory referral/consult to Orthopedics; Future  -     X-Ray Wrist Complete Bilateral; Future    Lateral epicondylitis, right elbow  -     Ambulatory referral/consult to Orthopedics; Future  -     X-Ray Wrist Complete Bilateral; Future    Anterior shin splints  -     Ambulatory referral/consult to Physical/Occupational Therapy; Future    Insomnia, unspecified type  -     Hemoglobin A1C; Future  -     CBC Without Differential; Future  -     Comprehensive Metabolic Panel; Future  -     TSH; Future  -     T4, Free; Future  -     Lipid Panel; Future  -     PSA, Screening; Future  -     Vitamin D; Future  -     Testosterone Panel; Future  -     Vitamin B12 Deficiency Panel; Future  -     Ferritin; Future  -     Uric Acid; Future    Restless legs  -     Hemoglobin A1C; Future  -     CBC Without Differential; Future  -     Comprehensive Metabolic Panel; Future  -     TSH; Future  -     T4, Free; Future  -     Lipid Panel; Future  -     PSA, Screening; Future  -     Vitamin D; Future  -     Testosterone Panel; Future  -     Vitamin B12 Deficiency Panel; Future  -     Ferritin; Future  -     Uric Acid; Future    PTSD (post-traumatic stress disorder)  -     Hemoglobin A1C; Future  -     CBC Without Differential; Future  -     Comprehensive Metabolic Panel; Future  -     TSH; Future  -     T4, Free; Future  -     Lipid Panel; Future  -     PSA, Screening; Future  -     Vitamin D; Future  -     Testosterone Panel; Future  -     Vitamin B12 Deficiency Panel; Future  -     Ferritin; Future  -      Uric Acid; Future    Routine general medical examination at a health care facility  -     Hemoglobin A1C; Future  -     CBC Without Differential; Future  -     Comprehensive Metabolic Panel; Future  -     TSH; Future  -     T4, Free; Future  -     Lipid Panel; Future  -     PSA, Screening; Future  -     Vitamin D; Future  -     Testosterone Panel; Future  -     Vitamin B12 Deficiency Panel; Future  -     Ferritin; Future  -     Uric Acid; Future        Assessment & Plan    IMPRESSION:  - Considered orthopedic evaluation for multiple joint issues, including shoulder, neck, wrist, and elbow pain  - Ordered x-rays of neck and shoulder to assess for potential structural issues  - Reviewed prior labs from February, noting normal results including inflammation markers  - Considered testosterone level check due to patient's age and reported sexual changes    PLAN SUMMARY:  - Refer to Dr. Solis for wrist and elbow evaluation  - Refer to Dr. Danielle for shoulder evaluation  - Refer to  Kaz Flannery for mental health treatment  - Refer to physical therapy with Jv for joint issues, ankle treatment, and shin splints  - Order x-rays of neck and shoulder  - Schedule labs after February 14, 2025: PSA, vitamin D, and testosterone levels  - Explore compression therapy and cold/warm plunge for recovery and shin splints management  - Try swimming as a low-impact exercise alternative  - Follow up after orthopedic evaluations and x-ray results  - Consider alternative mental health services through [facility] if needed    INSOMNIA:  - Noted the patient's report of insomnia related to stress.  - Conducted a sleep study which ruled out sleep apnea.  - Acknowledged that insomnia may be contributing to the patient's physical and mental state.    PTSD:  - Noted the patient's report of receiving an initial assessment and diagnosis of PTSD.  - Referred the patient to  Kaz Flannery for further treatment, acknowledging  the patient's preference for a therapist with  experience.  - Suggested alternative mental health services through [facility] if needed.    half-way ADJUSTMENT:  - Noted the patient's report of feeling stuck and in a rut, partly due to upcoming correction.  - Discussed post-correction options and suggested taking time to figure out future plans.  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    ELBOW PAIN:  - Noted the patient's report of elbow pain and difficulty shaking hands.  - Referred the patient to Dr. Solis for elbow evaluation.    SHOULDER PAIN:  - Noted the patient's report of right shoulder pain with normal range of motion and when lifting objects.  - Observed physical therapist's note of improper shoulder glide path due to posture issues, with right shoulder sitting lower than left.  - Ordered x-rays of the shoulder.  - Referred the patient to Dr. Danielle for shoulder evaluation.    NECK STIFFNESS:  - Noted the patient's report of neck stiffness.  - Ordered x-rays of the neck.  - Included neck treatment in the physical therapy referral.    ANKLE PAIN:  - Noted the patient's report of ankle pain and limited ankle mobility.  - Observed physical therapist's note of mobility issues with the ankle.  - Referred the patient to physical therapy with Jv for joint issues, including ankle treatment.    WRIST PAIN AND FINGER NUMBNESS:  - Noted the patient's report of wrist pain and numbness in fingers while typing.  - Reviewed EMG study for ca  rpal tunnel, which was normal.  - Questioned if EMG study was done correctly and planned further evaluation.  - Referred the patient to Dr. Solis for wrist evaluation.  - Noted the patient's report of numbness in fingers while typing, which resolves with movement.  - Referred the patient to Dr. Solis for evaluation of numbness and tingling.    KNEE PAIN:  - Noted the patient's report of developing pain in left  knee after physical therapy for right knee.    SHIN SPLINTS:  - Noted the patient's report of shin splints from running.  - Referred the patient to physical therapy with Jv for farley splints.  - Recommend trying compression therapy for shin splints.    EXERCISE RECOMMENDATIONS:  - Discussed benefits of swimming for joint health and as a low-impact alternative to running.  - Patient to try swimming as a low-impact exercise alternative.  - Explained potential benefits of compression therapy and cold/warm plunge for recovery and shin splints management.  - Patient can consider using compression therapy and cold/warm plunge for recovery and shin splint management.    LABS:  - Schedule labs on or after February 14, 2025, including PSA, vitamin D, and testosterone levels.    FOLLOW UP:  - Follow up after orthopedic evaluations and x-ray results.         X-Ray Cervical Spine 2 or 3 Views  Narrative: EXAM:  XR CERVICAL SPINE 2 OR 3 VIEWS    CLINICAL HISTORY:  Pain    FINDINGS:    Vertebral body height and alignment are maintained.  Mild disc space narrowing lower cervical spine.  No acute fracture is evident.  No prevertebral soft tissue swelling.  Impression:  No acute radiographic abnormality on C-spine series.    Finalized on: 11/13/2024 3:25 PM By:  Rasheed Sutton MD  [facility]# [id number]      2024-11-13 15:27:[id number]    BRKEL    The 10-year ASCVD risk score (Jaylen DK, et al., 2019) is: 1.1%    Values used to calculate the score:      Age: 44 years      Sex: Male      Is Non- : No      Diabetic: No      Tobacco smoker: No      Systolic Blood Pressure: 116 mmHg      Is BP treated: No      HDL Cholesterol: 48 mg/dL      Total Cholesterol: 161 mg/dL    Follow-up: Follow up in about 3 months (around 2/20/2025) for physical with Dr AVENDANO.    I spent a total of   45    minutes face to face and non-face to face on the date of this visit.This includes time preparing to see the patient (eg, review of tests,  notes), obtaining and/or reviewing additional history from an independent historian and/or outside medical records, documenting clinical information in the electronic health record, independently interpreting results and/or communicating results to the patient/family/caregiver, or care coordinator.  Visit today included increased complexity associated with the care of the episodic problem addressed and managing the longitudinal care of the patient due to the serious and/or complex managed problem(s).    This note was generated with the assistance of ambient listening technology. Verbal consent was obtained by the patient and accompanying visitor(s) for the recording of patient appointment to facilitate this note. I attest to having reviewed and edited the generated note for accuracy, though some syntax or spelling errors may persist. Please contact the author of this note for any clarification.       There are no Patient Instructions on file for this visit.

## 2024-11-19 ENCOUNTER — PATIENT MESSAGE (OUTPATIENT)
Dept: PRIMARY CARE CLINIC | Facility: CLINIC | Age: 44
End: 2024-11-19
Payer: OTHER GOVERNMENT

## 2024-11-21 ENCOUNTER — OFFICE VISIT (OUTPATIENT)
Dept: ORTHOPEDICS | Facility: CLINIC | Age: 44
End: 2024-11-21
Payer: OTHER GOVERNMENT

## 2024-11-21 VITALS — BODY MASS INDEX: 25.18 KG/M2 | WEIGHT: 179.88 LBS | HEIGHT: 71 IN

## 2024-11-21 DIAGNOSIS — M77.11 LATERAL EPICONDYLITIS, RIGHT ELBOW: ICD-10-CM

## 2024-11-21 DIAGNOSIS — G56.03 BILATERAL CARPAL TUNNEL SYNDROME: Primary | ICD-10-CM

## 2024-11-21 DIAGNOSIS — R20.0 BILATERAL HAND NUMBNESS: ICD-10-CM

## 2024-11-21 DIAGNOSIS — M25.532 PAIN IN BOTH WRISTS: ICD-10-CM

## 2024-11-21 DIAGNOSIS — G56.23 CUBITAL TUNNEL SYNDROME, BILATERAL: ICD-10-CM

## 2024-11-21 DIAGNOSIS — M25.531 PAIN IN BOTH WRISTS: ICD-10-CM

## 2024-11-21 PROCEDURE — 99999 PR PBB SHADOW E&M-EST. PATIENT-LVL III: CPT | Mod: PBBFAC,,, | Performed by: ORTHOPAEDIC SURGERY

## 2024-11-21 PROCEDURE — 99213 OFFICE O/P EST LOW 20 MIN: CPT | Mod: PBBFAC | Performed by: ORTHOPAEDIC SURGERY

## 2024-11-21 NOTE — PROGRESS NOTES
TRICIA Chong M.D.  Orthopaedic Hand and Wrist Surgery  [address]    Patient ID: Sacha Fregoso  YOB: 1980  MRN: [id number]    Provider Note/Medical Decision Makin. Bilateral carpal tunnel syndrome  Assessment & Plan:  The patient and I talked at length about the natural history and pathophysiology of bilateral carpal tunnel syndrome, he understands that this is a chronic problem which may have acute episodic exacerbations.   Symptoms may resolve, worsen and even become permanent.  The patient understands the treatment options including observation, activity modification, therapy, NSAIDs, splints, injections and the surgical options including carpal tunnel release.  We discussed the risks of the diagnosis and the treatment options including pain, infection, bleeding, damage to nerves and vessels, stiffness, scarring, incomplete relief or recurrence of symptoms, poor pain and functional outcomes.  Unique risks of this diagnosis and the treatment include incomplete and no relief of symptoms as the patient has a negative EMG.The patient has elected to proceed with nerve glide exercises, nighttime bracing and we will follow up in 6 weeks if necessary.        2. Pain in both wrists  -     Ambulatory referral/consult to Orthopedics    3. Bilateral hand numbness  -     Ambulatory referral/consult to Orthopedics    4. Lateral epicondylitis, right elbow  Assessment & Plan:  The patient and I talked at length about the natural history and pathophysiology of right lateral epicondylitis, he understands that this is a chronic problem which may have acute episodic exacerbations.   Symptoms may resolve, worsen and even become permanent.  The patient understands the treatment options including observation, activity modification, therapy, NSAIDs, splints, injections and the surgical options including lateral epicondylar debridement.  We discussed the risks of the diagnosis and the  treatment options including pain, infection, bleeding, damage to nerves and vessels, stiffness, scarring, incomplete relief or recurrence of symptoms, poor pain and functional outcomes.  Unique risks of this diagnosis and the treatment include recurrence of symptoms.  The patients treatment is further complicated by activity level.  The patient has elected to proceed with therapy, activity modification, NSAIDs and we will follow up in 6 weeks.      Orders:  -     Ambulatory referral/consult to Orthopedics    5. Cubital tunnel syndrome, bilateral  Assessment & Plan:  The patient and I talked at length about the natural history and pathophysiology of bilateral cubital tunnel syndrome, he understands that this is a chronic problem which may have acute episodic exacerbations.   Symptoms may resolve, worsen and even become permanent.  The patient understands the treatment options including observation, activity modification, therapy, NSAIDs, splints, injections and the surgical options including cubital tunnel release.  We discussed the risks of the diagnosis and the treatment options including pain, infection, bleeding, damage to nerves and vessels, stiffness, scarring, incomplete relief or recurrence of symptoms, poor pain and functional outcomes.  Unique risks of this diagnosis and the treatment include incomplete and no relief of symptoms, ulnar nerve instability.  The patients treatment is further complicated by negative EMG which may indicate a different area of compression however his clinical exam is very consistent with cubital tunnel syndrome.  The patient has elected to proceed with nerve glide exercises and we will follow up in 6 weeks.             Chief Complaint: Pain and Numbness of the Left Hand and Pain and Numbness of the Right Hand      Referred By: Other    History of Present Illness: Sacha Fregoso is a 44 y.o. male with bilateral hand numbness tingling it has been going on for over a year it  "is mostly a small finger ring finger numbness that bothers him when he was at a [place] resting his forearms on the desk.  His numbness tingling off and on it bothers him daily he does not remember waking up at night with any numbness and tingling he does occasionally have some numbness in his thumbs he also has new onset right lateral elbow pain that is been going on for the past several months worse with activity he does do Cross[employer].      Patient was queried and this is the extent of the patients current complaints today.    Past Medical History:     Estimated body mass index is 25.09 kg/m² as calculated from the following:    Height as of this encounter: 5' 11" (1.803 m).    Weight as of this encounter: 81.6 kg (179 lb 14.3 oz).  Past Medical History:   Diagnosis Date    Colon polyp 2008 2013 clear resume at 10 year interval.     GERD (gastroesophageal reflux disease)     Seasonal allergies      Past Surgical History:   Procedure Laterality Date    COLONOSCOPY      COLONOSCOPY N/A 12/27/2023    Procedure: COLONOSCOPY;  Surgeon: Jennifer Mcfarland MD;  Location: [facility];  Service: Endoscopy;  Laterality: N/A;    ESOPHAGOGASTRODUODENOSCOPY N/A 7/24/2024    Procedure: EGD (ESOPHAGOGASTRODUODENOSCOPY);  Surgeon: Magi Roth MD;  Location: [facility];  Service: Gastroenterology;  Laterality: N/A;    VASECTOMY      wisdoom teeth       Family History   Problem Relation Name Age of Onset    Diabetes Mother Mom     Hyperlipidemia Father Dad     No Known Problems Sister      No Known Problems Brother      No Known Problems Maternal Aunt      No Known Problems Maternal Uncle      No Known Problems Paternal Aunt      No Known Problems Paternal Uncle      No Known Problems Maternal Grandmother      No Known Problems Maternal Grandfather      COPD Paternal Grandmother Rissa     No Known Problems Paternal Grandfather      Cancer Paternal Aunt Viktoriya Stout      Social History     Socioeconomic History    " Marital status:      Spouse name: Thao    Number of children: 3   Occupational History     Comment: [employer] time [employer]   Tobacco Use    Smoking status: Former     Types: Cigarettes     Passive exposure: Never    Smokeless tobacco: Former     Types: Chew   Substance and Sexual Activity    Alcohol use: Yes     Alcohol/week: 1.0 - 2.0 standard drink of alcohol     Types: 1 - 2 Cans of beer per week     Comment: 2x monthly    Drug use: No    Sexual activity: Yes     Partners: Female     Birth control/protection: Partner-Vasectomy   Social History Narrative    Mom is Saranya Fregoso     Social Drivers of Health     Financial Resource Strain: Low Risk  (11/30/2023)    Overall Financial Resource Strain (CARDIA)     Difficulty of Paying Living Expenses: Not hard at all   Food Insecurity: No Food Insecurity (11/30/2023)    Hunger Vital Sign     Worried About Running Out of Food in the Last Year: Never true     Ran Out of Food in the Last Year: Never true   Transportation Needs: No Transportation Needs (11/30/2023)    PRAPARE - Transportation     Lack of Transportation (Medical): No     Lack of Transportation (Non-Medical): No   Physical Activity: Sufficiently Active (11/30/2023)    Exercise Vital Sign     Days of Exercise per Week: 5 days     Minutes of Exercise per Session: 60 min   Stress: No Stress Concern Present (11/30/2023)    [employer] - Occupational Stress Questionnaire     Feeling of Stress : Only a little   Housing Stability: Low Risk  (11/30/2023)    Housing Stability Vital Sign     Unable to Pay for Housing in the Last Year: No     Number of Places Lived in the Last Year: 1     Unstable Housing in the Last Year: No     Medication List with Changes/Refills   Current Medications    DICLOFENAC (VOLTAREN) 75 MG EC TABLET    Take 1 tablet (75 mg total) by mouth 2 (two) times daily with meals.    FAMOTIDINE (PEPCID) 20 MG TABLET    Take 1 tablet (20 mg total) by mouth 2  (two) times daily.    IBUPROFEN (ADVIL,MOTRIN) 200 MG TABLET    Take 200 mg by mouth every 6 (six) hours as needed for Pain.     Review of patient's allergies indicates:  No Known Allergies  ROS    Physical Exam:   GENERAL: Well appearing, appropriate for stated age, no acute distress.  CARDIOVASCULAR:  Fingers have good brisk refill and good turgor.   PULMONARY: Respirations are even and non-labored.  NEURO: Awake, alert, and oriented x 3.  PSYCH: Mood & affect are appropriate.              Right Hand/Wrist Exam     Range of Motion   The patient has normal right hand/wrist ROM.    Tests   Phalens sign: positive  Tinel's sign (median nerve): positive  Carpal Tunnel Compression Test: positive        Left Hand/Wrist Exam     Range of Motion   The patient has normal left hand/wrist ROM.    Tests   Phalens sign: positive  Tinel's sign (median nerve): positive  Carpal Tunnel Compression Test: positive        Right Elbow Exam     Tests   Tinel's sign (cubital tunnel): positive      Left Elbow Exam     Tests   Tinel's sign (cubital tunnel): positive        Hand/Wrist Musculoskeletal Exam    Inspection    Right      Right hand/wrist inspection is normal.    Left      Left hand/wrist inspection is normal.    Palpation    Right      Right hand palpation is normal.      Right wrist palpation is normal.    Left       Left hand palpation is normal.      Left wrist palpation is normal.    Palpation additional comments: Pain over right lateral epicondyle no pain of the left  Pain with resisted wrist extension on the right negative on the left    Range of Motion    Right Hand      Right hand range of motion is normal.      Left Hand      Left hand range of motion is normal.       Right Wrist      Right wrist range of motion is normal.      Left Wrist      Left wrist range of motion is normal.      Strength    Right Hand      Abductor pollicis brevis: 5/5.       1st dorsal interossei abduction: 5/5.      Left Hand      Abductor  pollicis brevis: 5/5.       1st dorsal interossei abduction: 5/5.        Strength additional comments: 5/5 wrist extension wrist flexion bilaterally  5/5 elbow flexion and elbow extension bilaterally    Neurovascular    Right       Right neurovascular exam is normal.    Left       Left neurovascular exam is normal.    Special Tests    Right      Phalen's: positive      Carpal compression test: positive      CMC grind test: negative      Elbow flexion: positive      Tinel's - carpal tunnel: positive      Tinel's - cubital tunnel: positive    Left      Phalen's: positive      Carpal compression test: positive      CMC grind test: negative      Elbow flexion: positive      Tinel's - carpal tunnel: positive      Tinel's - cubital tunnel: positive    Patient does have an EMG from July which was normal      Provider Note/Medical Decision Makin. Bilateral carpal tunnel syndrome  Assessment & Plan:  The patient and I talked at length about the natural history and pathophysiology of bilateral carpal tunnel syndrome, he understands that this is a chronic problem which may have acute episodic exacerbations.   Symptoms may resolve, worsen and even become permanent.  The patient understands the treatment options including observation, activity modification, therapy, NSAIDs, splints, injections and the surgical options including carpal tunnel release.  We discussed the risks of the diagnosis and the treatment options including pain, infection, bleeding, damage to nerves and vessels, stiffness, scarring, incomplete relief or recurrence of symptoms, poor pain and functional outcomes.  Unique risks of this diagnosis and the treatment include incomplete and no relief of symptoms as the patient has a negative EMG.The patient has elected to proceed with nerve glide exercises, nighttime bracing and we will follow up in 6 weeks if necessary.        2. Pain in both wrists  -     Ambulatory referral/consult to Orthopedics    3.  Bilateral hand numbness  -     Ambulatory referral/consult to Orthopedics    4. Lateral epicondylitis, right elbow  Assessment & Plan:  The patient and I talked at length about the natural history and pathophysiology of right lateral epicondylitis, he understands that this is a chronic problem which may have acute episodic exacerbations.   Symptoms may resolve, worsen and even become permanent.  The patient understands the treatment options including observation, activity modification, therapy, NSAIDs, splints, injections and the surgical options including lateral epicondylar debridement.  We discussed the risks of the diagnosis and the treatment options including pain, infection, bleeding, damage to nerves and vessels, stiffness, scarring, incomplete relief or recurrence of symptoms, poor pain and functional outcomes.  Unique risks of this diagnosis and the treatment include recurrence of symptoms.  The patients treatment is further complicated by activity level.  The patient has elected to proceed with therapy, activity modification, NSAIDs and we will follow up in 6 weeks.      Orders:  -     Ambulatory referral/consult to Orthopedics    5. Cubital tunnel syndrome, bilateral  Assessment & Plan:  The patient and I talked at length about the natural history and pathophysiology of bilateral cubital tunnel syndrome, he understands that this is a chronic problem which may have acute episodic exacerbations.   Symptoms may resolve, worsen and even become permanent.  The patient understands the treatment options including observation, activity modification, therapy, NSAIDs, splints, injections and the surgical options including cubital tunnel release.  We discussed the risks of the diagnosis and the treatment options including pain, infection, bleeding, damage to nerves and vessels, stiffness, scarring, incomplete relief or recurrence of symptoms, poor pain and functional outcomes.  Unique risks of this diagnosis and the  treatment include incomplete and no relief of symptoms, ulnar nerve instability.  The patients treatment is further complicated by negative EMG which may indicate a different area of compression however his clinical exam is very consistent with cubital tunnel syndrome.  The patient has elected to proceed with nerve glide exercises and we will follow up in 6 weeks.         We had a long discussion about the fact that he does have a normal EMG but his history and physical exam are very consistent with bilateral carpal and cubital tunnel syndrome.  We will try the nerve glide exercises if he is no better we may consider a repeat EMG prior to proceeding with the surgery.    I discussed worrisome and red flag signs and symptoms with the patient. The patient expressed understanding and agreed to alert me immediately or to go to the emergency room if they experience any of these.   Treatment plan was developed with input from the patient/family, and they expressed understanding and agreement with the plan. All questions were answered today.    There are no Patient Instructions on file for this visit.    TRICIA Chong M.D.  [facility]  Orthopedic Hand and Wrist Surgeon    [facility]  Dr. Tanner Chong   [employer] Review   [employer]   [employer]     Disclaimer: This note was prepared using a voice recognition system and is likely to have sound alike errors within the text.

## 2024-11-21 NOTE — ASSESSMENT & PLAN NOTE
The patient and I talked at length about the natural history and pathophysiology of bilateral carpal tunnel syndrome, he understands that this is a chronic problem which may have acute episodic exacerbations.   Symptoms may resolve, worsen and even become permanent.  The patient understands the treatment options including observation, activity modification, therapy, NSAIDs, splints, injections and the surgical options including carpal tunnel release.  We discussed the risks of the diagnosis and the treatment options including pain, infection, bleeding, damage to nerves and vessels, stiffness, scarring, incomplete relief or recurrence of symptoms, poor pain and functional outcomes.  Unique risks of this diagnosis and the treatment include incomplete and no relief of symptoms as the patient has a negative EMG.The patient has elected to proceed with nerve glide exercises, nighttime bracing and we will follow up in 6 weeks if necessary.

## 2024-11-21 NOTE — ASSESSMENT & PLAN NOTE
The patient and I talked at length about the natural history and pathophysiology of bilateral cubital tunnel syndrome, he understands that this is a chronic problem which may have acute episodic exacerbations.   Symptoms may resolve, worsen and even become permanent.  The patient understands the treatment options including observation, activity modification, therapy, NSAIDs, splints, injections and the surgical options including cubital tunnel release.  We discussed the risks of the diagnosis and the treatment options including pain, infection, bleeding, damage to nerves and vessels, stiffness, scarring, incomplete relief or recurrence of symptoms, poor pain and functional outcomes.  Unique risks of this diagnosis and the treatment include incomplete and no relief of symptoms, ulnar nerve instability.  The patients treatment is further complicated by negative EMG which may indicate a different area of compression however his clinical exam is very consistent with cubital tunnel syndrome.  The patient has elected to proceed with nerve glide exercises and we will follow up in 6 weeks.

## 2024-11-21 NOTE — ASSESSMENT & PLAN NOTE
The patient and I talked at length about the natural history and pathophysiology of right lateral epicondylitis, he understands that this is a chronic problem which may have acute episodic exacerbations.   Symptoms may resolve, worsen and even become permanent.  The patient understands the treatment options including observation, activity modification, therapy, NSAIDs, splints, injections and the surgical options including lateral epicondylar debridement.  We discussed the risks of the diagnosis and the treatment options including pain, infection, bleeding, damage to nerves and vessels, stiffness, scarring, incomplete relief or recurrence of symptoms, poor pain and functional outcomes.  Unique risks of this diagnosis and the treatment include recurrence of symptoms.  The patients treatment is further complicated by activity level.  The patient has elected to proceed with therapy, activity modification, NSAIDs and we will follow up in 6 weeks.

## 2024-11-26 ENCOUNTER — CLINICAL SUPPORT (OUTPATIENT)
Facility: HOSPITAL | Age: 44
End: 2024-11-26
Payer: OTHER GOVERNMENT

## 2024-11-26 DIAGNOSIS — M22.2X1 PATELLOFEMORAL PAIN SYNDROME OF RIGHT KNEE: Primary | ICD-10-CM

## 2024-11-26 DIAGNOSIS — M17.11 PRIMARY OSTEOARTHRITIS OF RIGHT KNEE: ICD-10-CM

## 2024-11-26 DIAGNOSIS — M54.2 NECK PAIN: ICD-10-CM

## 2024-11-26 DIAGNOSIS — M25.551 RIGHT HIP PAIN: ICD-10-CM

## 2024-11-26 DIAGNOSIS — S86.899A ANTERIOR SHIN SPLINTS: ICD-10-CM

## 2024-11-26 DIAGNOSIS — G89.29 CHRONIC RIGHT SHOULDER PAIN: ICD-10-CM

## 2024-11-26 DIAGNOSIS — M25.511 CHRONIC RIGHT SHOULDER PAIN: ICD-10-CM

## 2024-11-26 PROCEDURE — 97162 PT EVAL MOD COMPLEX 30 MIN: CPT | Mod: PN | Performed by: PHYSICAL THERAPIST

## 2024-11-26 PROCEDURE — 97112 NEUROMUSCULAR REEDUCATION: CPT | Mod: PN | Performed by: PHYSICAL THERAPIST

## 2024-11-26 NOTE — PLAN OF CARE
[facility]  Physical Therapy Plan of Care Note     Name: Sacha Fregoso  Clinic Number: [id number]    Therapy Diagnosis:   Encounter Diagnoses   Name Primary?    Patellofemoral pain syndrome of right knee Yes    Primary osteoarthritis of right knee     Right hip pain      Physician: John Ferguson MD    Visit Date: 6/10/2024    Physician Orders: PT Eval and Treat   Medical Diagnosis from Referral: Right hip pain [M25.551], Primary osteoarthritis of right knee [M17.11], Patellofemoral pain syndrome of right knee [M22.2X1]   Evaluation Date: 12/20/2023  Authorization Period Expiration: 4/12/24  Plan of Care Expiration: , 8/10/24  Progress Note Due: 7/10/24  Visit # / Visits authorized: 14/14    Precautions: Standard  Functional Level Prior to Evaluation:  Limited due to pain    SUBJECTIVE     Update: Pt reports: 6/10- Both knees are a little sore from his week at the beach. Maybe stairs, change in routine, carrying things in the sand.     OBJECTIVE     Update:   Knee sport cord test- 5/27  Single Leg Knee Bends  Time - 2:30  Score - 5/6  Lateral Agility   Time - full  Score - 4/6  Forward Running   Time - full  Score - 4/4  Backward Running   Time - full  Score - 4/4  ** SCORE: 17/20**      Hip abductor strength  R- 73.3#  L- 75.4#     Hip extensor strength  R- 4/5  L- 4/5        Single-leg step down assessment: (0-1 good quality of movement, 2-3 moderate, 4+ poor)                                                                          LLE      RLE              1. Hands off hips                                 []        []               2. Trunk shift                                       []        []               3. Pelvic drop                                      []        []               4. Knee medial to 2nd toe                   [x]        [x]               5. Lift off of medial foot                       []        []               6. Loss of balance                         Spoke with pt as we received paperwork from [employer] and [employer] for diabetic supplies. Pt states she uses [employer]. She also would like a Makenzie 3. Order placed.           [x]        []                                                                                                          2/6 1/6        Media Information            ASSESSMENT     Update: Patient's isokinetic testing shows persistent quad and hamstring deficts that likely contribute to continued dysfunction. His overall single leg stability and motor control have improved well. Patient would benefit from continued skilled care to address persistent strength deficits.     Previous Short Term Goals Status:   partially met  New Short Term Goals Status:   Fully met  Long Term Goal Status: continue per initial plan of care.  Reasons for Recertification of Therapy:   Goals not full met, continued symptoms    GOALS  Short Term Goals:  6 weeks Status  Date Met   PAIN: Pt will report worst pain of 2/10 in order to progress toward max functional ability and improve quality of life. [] Progressing  [x] Met  [] Not Met  3/18/24   FUNCTION: Patient will demonstrate improved function as indicated by a functional score improvement of at least 5 points on FOTO. [] Progressing  [x] Met  [] Not Met  3/18/24   MOBILITY: Patient will improve AROM to 50% of stated goals, listed in objective measures above, in order to progress towards independence with functional activities.  [] Progressing  [x] Met  [] Not Met  3/18/24   STRENGTH: Patient will improve strength to 50% of stated goals, listed in objective measures above, in order to progress towards independence with functional activities. [x] Progressing  [x] Met  [] Not Met  3/18/24   POSTURE: Patient will correct postural deviations in sitting and standing, to decrease pain and promote long term stability.  [] Progressing  [x] Met  [] Not Met  3/18/24   HEP: Patient will demonstrate independence with HEP in order to progress toward functional independence. [] Progressing  [x] Met  [] Not Met  3/18/24      Long Term Goals:  12 weeks Status Date Met   PAIN: Pt will report  worst pain of 1/10 in order to progress toward max functional ability and improve quality of life [x] Progressing  [] Met  [] Not Met     FUNCTION: Patient will demonstrate improved function as indicated by a FOTO functional score improvement as listed in header. [x] Progressing  [] Met  [] Not Met     MOBILITY: Patient will improve AROM to stated goals, listed in objective measures above, in order to return to maximal functional potential and improve quality of life.  [x] Progressing  [x] Met  [] Not Met     STRENGTH: Patient will improve strength to stated goals, listed in objective measures above, in order to improve functional independence and quality of life.  [x] Progressing  [] Met  [] Not Met     GAIT: Patient will demonstrate normalized gait mechanics with minimal compensation in order to return to PLOF. [x] Progressing  [x] Met  [] Not Met     Patient will return to normal ADL's, IADL's, community involvement, recreational activities, and work-related activities with less than or equal to 1/10 pain and maximal function.  [x] Progressing  [] Met  [] Not Met          PLAN     Updated Certification Period: 6/10/24 to 8/10/24   Recommended Treatment Plan: 2 times per week for 8 weeks:  Manual Therapy, Moist Heat/ Ice, Neuromuscular Re-ed, Patient Education, Therapeutic Activities, Therapeutic Exercise, Gait Training, Electrical Stimulation PRN, and Dry needling PRN.     Other Recommendations: None    Jv Pedro, PT

## 2024-11-26 NOTE — PROGRESS NOTES
[facility]   Physical Therapy Initial Evaluation      Name: Sacha Fregoso  Clinic Number: [id number]    Therapy Diagnosis:   Encounter Diagnoses   Name Primary?    Neck pain     Chronic right shoulder pain     Anterior shin splints     Patellofemoral pain syndrome of right knee Yes    Primary osteoarthritis of right knee     Right hip pain         Physician: Zuri Knowles MD    Physician Orders: PT Eval and Treat   Medical Diagnosis from Referral: ***  Evaluation Date: 11/26/2024  Authorization Period Expiration: ***  Plan of Care Expiration: ***  Progress Note Due: ***  Visit # / Visits authorized: ***/ ***     FOTO:  Goal: ***%  -Intake: ***%  -Status: incomplete  -Discharge: incomplete    Precautions: {IP WOUND PRECAUTIONS OHS:14585}     Time In: ***  Time Out: ***  Total Appointment Time (timed & untimed codes): *** minutes    Subjective     Date of onset: Ongoing issue    History of current condition - Sacha reports: He started having farley splints, Dr. Knowles recommended getting back into physical therapy. He has also continued to get some work up for the R shoulder, neck, and upper extremity neuropathic symptoms. Diagnosed with tennis elbow, cubital tunnel syndrome, and carpal tunnel syndrome.    Shin splits started with running. Averaging 15-16 miles per week. Shin splints maybe 1 run per week, especially with longer runs. Tries to sprint 1x per week, he gets some hip and R knee pain.    R knee symptoms have been stable. R lateral hip pain worse more recently.    L calf strain a few weeks ago    Falls:     Imaging: [] Xray [] MRI [] CT: Performed on:     Pain:  Current 3/10  Location: [x] Right   [] Left:  lateral hip  Description: throbbing  Aggravating Factors: Running, weight bearing exercises  Easing Factors: activity avoidance, rest    Prior Therapy: Yes  Social History:    Occupation: , retires in February  Prior Level of Function: Very active with  periodic pain  Current Level of Function: Increased pain recently    Patients goals: Improve symptoms, continue to stay active     Medical History:   Past Medical History:   Diagnosis Date    Colon polyp 2008 2013 clear resume at 10 year interval.     GERD (gastroesophageal reflux disease)     Seasonal allergies        Surgical History:   Sacha Fregoso  has a past surgical history that includes wisdoom teeth; Colonoscopy; Vasectomy; Colonoscopy (N/A, 12/27/2023); and Esophagogastroduodenoscopy (N/A, 7/24/2024).    Medications:   Sacha has a current medication list which includes the following prescription(s): diclofenac, famotidine, and ibuprofen.    Allergies:   Review of patient's allergies indicates:  No Known Allergies     Objective      Patient Specific Functional Scale  Identify up to 5 important activities that are difficult for you  Rate them 0-10, 0 is impossible, 10 is no problem    Activity Initial     1.      2.      3.      4.      5.          Observation:   POSTURE:  ***  GAIT: ***      FUNCTIONAL MOVEMENT PATTERNS  Movement  Analysis Notes    Repeated Shoulder Flexion []Functional  []Dysfunctional: []Painful  []Non-Painful    Plank  []Functional  []Dysfunctional: []Painful  []Non-Painful    Push Up  []Functional  []Dysfunctional: []Painful  []Non-Painful     []Functional  []Dysfunctional: []Painful  []Non-Painful        FUNCTIONAL MOVEMENT PATTERNS  Movement Analysis Notes   Bridge hold for 30s []Functional  [x]Dysfunctional:  []Painful  [x]Non-Painful Impaired R glute control       MOTOR CONTROL TESTS:    Right  (spine) Left  Goal   Lower extremity      Prone knee bend [x] Positive  [] Negative  Comments: [] Positive  [x] Negative  Comments: Negative B         {KMBNEURO:62295}      {RANGE OF MOTION (Optional):57364}      STRENGTH:   U/E MMT Right Left Pain/Dysfunction with Movement Goal   Shoulder Flexion {MMT SCORES:61943} {MMT SCORES:06712}  4+/5 B   Shoulder Extension {MMT  SCORES:99919} {MMT SCORES:89818}  4+/5 B   Shoulder Abduction {MMT SCORES:45415} {MMT SCORES:65263}  4+/5 B   Shoulder IR {MMT SCORES:75763} {MMT SCORES:41623}  4+/5 B   Shoulder ER {MMT SCORES:01092} {MMT SCORES:48465}  4+/5 B   Serratus Anterior {MMT SCORES:57696} {MMT SCORES:03516}  4+/5 B   Middle Trapezius {MMT SCORES:98905} {MMT SCORES:85701}  4+/5 B   Lower Trapezius {MMT SCORES:34263} {MMT SCORES:21964}  4+/5 B   Elbow Flexion  {MMT SCORES:26814} {MMT SCORES:81054}  5/5 B   Elbow Extension {MMT SCORES:11426} {MMT SCORES:76576}  5/5 B   Wrist Flexion {MMT SCORES:09896} {MMT SCORES:80220}  5/5 B   Wrist Extension {MMT SCORES:61131} {MMT SCORES:07800}  5/5 B       L/E MMT Right  (spine) Left Pain/Dysfunction with Movement Goal   Modified (90/90) Abdominal Strength  *** ---     Hip Flexion  {MMT SCORES:81187} {MMT SCORES:32689}  4+/5 B   Hip Extension  4/5 4+/5  4+/5 B   Hip Abduction  4/5 4+/5 Lateral hip pain 4+/5 B   Knee Extension {MMT SCORES:91428} {MMT SCORES:45269}  5/5 B   Knee Flexion {MMT SCORES:68255} {MMT SCORES:02369}  5/5 B   Hip IR {MMT SCORES:24107} {MMT SCORES:94059}  4+/5 B   Hip ER {MMT SCORES:32239} {MMT SCORES:33889}  4+/5 B   Ankle DF {MMT SCORES:60636} {MMT SCORES:12724}  5/5 B   Ankle PF {MMT SCORES:47601} {MMT SCORES:69302}  5/5 B   Ankle Inversion {MMT SCORES:68560} {MMT SCORES:12175}  5/5 B   Ankle Eversion {MMT SCORES:62880} {MMT SCORES:53842}  5/5 B          {MUSCLE LENGTH (Optional):98625}      Joint mobility:  ***      {SPECIAL TESTS (Optional):07764}      SENSATION  [x] Intact to Light Touch   [] Impaired:      PALPATION: Structures: Increased tenderness to palpation of: {RIGHT/LEFT:60337} ***      Function:     Intake Outcome Measure for FOTO *** Survey    Therapist reviewed FOTO scores for Sacha Fregoso on 11/26/2024.   FOTO documents entered into [employer] - see Media section.    Intake Score: ***%         Treatment     Total Treatment time (time-based codes) separate from  "Evaluation: (***) minutes     Sacha received the treatments listed below:          Patient Education and Home Exercises     Education provided: (10) minutes  {PATIENT EDUCATION (Optional):34156}  Physical therapy diagnosis, prognosis, and plan of care.    Activity Modifications: ***    Written Home Exercises Provided: yes.  Exercises were reviewed and Sacha was able to demonstrate them prior to the end of the session.  Sacha demonstrated {Desc; good/fair/poor:44697} understanding of the education provided. See EMR under Patient Instructions for exercises provided during therapy sessions.    Assessment     Sacha is a 44 y.o. male referred to outpatient Physical Therapy with a medical diagnosis of ***. Clinical exam is consistent with ***.     Problem list:  ***    Pt prognosis is {REHAB PROGNOSIS OHS:40258::"Excellent"}  Pt will benefit from skilled outpatient Physical Therapy to address the deficits stated above and in the chart below, provide pt/family education, and to maximize pt's level of independence.     Plan of care discussed with patient: Yes  Pt's spiritual, cultural and educational needs considered and patient is agreeable to the plan of care and goals as stated below:     Anticipated Barriers for therapy: {barriers for care:63569}    Medical Necessity is demonstrated by the following ***  History  Co-morbidities and personal factors that may impact the plan of care [] LOW: no personal factors / co-morbidities  [] MODERATE: 1-2 personal factors / co-morbidities  [] HIGH: 3+ personal factors / co-morbidities    Moderate / High Support Documentation: See patient medical history and objective assessment     Examination  Body Structures and Functions, activity limitations and participation restrictions that may impact the plan of care [] LOW: addressing 1-2 elements  [] MODERATE: 3+ elements  [] HIGH: 4+ elements (please support below)    Moderate / High Support Documentation: See patient " "medical history and objective assessment     Clinical Presentation [] LOW: stable  [] MODERATE: Evolving  [] HIGH: Unstable     Decision Making/ Complexity Score: {Desc; low/moderate/high:032522}         Short Term Goals:  {numbers 1-12:43017::"6"} weeks Status  Date Met   PAIN: Pt will report worst pain of ***/10 in order to progress toward max functional ability and improve quality of life. [x] Progressing  [] Met  [] Not Met    FUNCTION: Patient will demonstrate improved function as indicated by a functional score improvement of at least 5 points on FOTO. [x] Progressing  [] Met  [] Not Met    MOBILITY: Patient will improve AROM to 50% of stated goals, listed in objective measures above, in order to progress towards independence with functional activities.  [x] Progressing  [] Met  [] Not Met    STRENGTH: Patient will improve strength to 50% of stated goals, listed in objective measures above, in order to progress towards independence with functional activities. [x] Progressing  [] Met  [] Not Met    POSTURE: Patient will correct postural deviations in sitting and standing, to decrease pain and promote long term stability.  [x] Progressing  [] Met  [] Not Met    GAIT: Patient will demonstrate improved gait mechanics including *** in order to improve functional mobility, improve quality of life, and decrease risk of further injury or fall.  [x] Progressing  [] Met  [] Not Met    HEP: Patient will demonstrate independence with HEP in order to progress toward functional independence. [x] Progressing  [] Met  [] Not Met    *** [x] Progressing  [] Met  [] Not Met      Long Term Goals:  {numbers 1-12:86782::"12"} weeks Status Date Met   PAIN: Pt will report worst pain of ***/10 in order to progress toward max functional ability and improve quality of life [x] Progressing  [] Met  [] Not Met    FUNCTION: Patient will demonstrate improved function as indicated by a FOTO functional score improvement as listed in header. " "[x] Progressing  [] Met  [] Not Met    MOBILITY: Patient will improve AROM to stated goals, listed in objective measures above, in order to return to maximal functional potential and improve quality of life. {Set ROM goals} [x] Progressing  [] Met  [] Not Met    STRENGTH: Patient will improve strength to stated goals, listed in objective measures above, in order to improve functional independence and quality of life. {Set strength goals} [x] Progressing  [] Met  [] Not Met    GAIT: Patient will demonstrate normalized gait mechanics with minimal compensation in order to return to PLOF. [x] Progressing  [] Met  [] Not Met    Patient will return to normal ADL's, IADL's, community involvement, recreational activities, and work-related activities with less than or equal to ***/10 pain and maximal function.  [x] Progressing  [] Met  [] Not Met    *** [x] Progressing  [] Met  [] Not Met      Plan   Plan of care Certification: 11/26/2024 to ***.    Outpatient Physical Therapy {Numbers; 1-5:24189::"2"} times weekly for {numbers 1-12:45236::"12"} weeks to include any combination of the following interventions: virtual visits, dry needling, modalities, electrical stimulation (IFC, Pre-Mod, Attended with Functional Dry Needling), {TX PLAN:52889::"Cervical/Lumbar Traction","Gait Training","Manual Therapy","Neuromuscular Re-ed","Patient Education","Self Care","Therapeutic Exercise","Therapeutic Activites"}     Jv Pedro, PT, DPT      I CERTIFY THE NEED FOR THESE SERVICES FURNISHED UNDER THIS PLAN OF TREATMENT AND WHILE UNDER MY CARE   Physician's comments:     Physician's Signature: ___________________________________________________     "

## 2024-11-26 NOTE — PLAN OF CARE
[facility]   Physical Therapy Initial Evaluation      Name: Sacha Fregoso  Clinic Number: [id number]    Therapy Diagnosis:   Encounter Diagnoses   Name Primary?    Neck pain     Chronic right shoulder pain     Anterior shin splints     Patellofemoral pain syndrome of right knee Yes    Primary osteoarthritis of right knee     Right hip pain         Physician: Zuri Knowles MD    Physician Orders: PT Eval and Treat   Medical Diagnosis from Referral: Neck pain [M54.2], Chronic right shoulder pain [M25.511, G89.29], Anterior shin splints [S86.899A]   Evaluation Date: 11/26/2024  Authorization Period Expiration: 3/13/25  Plan of Care Expiration: 2/26/25  Progress Note Due: 12/26/24  Visit # / Visits authorized: 1/ 1     FOTO:  Goal: NT%  -Intake: NT%  -Status: incomplete  -Discharge: incomplete    Precautions: Standard     Time In: 7:30  Time Out: 8:45  Total Appointment Time (timed & untimed codes): 75 minutes    Subjective     Date of onset: Ongoing issue    History of current condition - Sacha reports: He started having farley splints, Dr. Knowles recommended getting back into physical therapy. He has also continued to get some work up for the R shoulder, neck, and upper extremity neuropathic symptoms. Diagnosed with tennis elbow, cubital tunnel syndrome, and carpal tunnel syndrome.    Shin splits started with running. Averaging 15-16 miles per week. Shin splints maybe 1 run per week, especially with longer runs. Tries to sprint 1x per week, he gets some hip and R knee pain.    R knee symptoms have been stable. R lateral hip pain worse more recently.    L calf strain a few weeks ago    Falls:     Imaging: [] Xray [] MRI [] CT: Performed on:     Pain:  Current 3/10  Location: [x] Right   [] Left:  lateral hip  Description: throbbing  Aggravating Factors: Running, weight bearing exercises  Easing Factors: activity avoidance, rest    Prior Therapy: Yes  Social History:    Occupation:  , retires in February  Prior Level of Function: Very active with periodic pain  Current Level of Function: Increased pain recently    Patients goals: Improve symptoms, continue to stay active     Medical History:   Past Medical History:   Diagnosis Date    Colon polyp 2008 2013 clear resume at 10 year interval.     GERD (gastroesophageal reflux disease)     Seasonal allergies        Surgical History:   Sacha Fregoso  has a past surgical history that includes wisdoom teeth; Colonoscopy; Vasectomy; Colonoscopy (N/A, 12/27/2023); and Esophagogastroduodenoscopy (N/A, 7/24/2024).    Medications:   Sacha has a current medication list which includes the following prescription(s): diclofenac, famotidine, and ibuprofen.    Allergies:   Review of patient's allergies indicates:  No Known Allergies     Objective          FUNCTIONAL MOVEMENT PATTERNS  Movement  Analysis Notes    Shoulder flexion []Functional  [x]Dysfunctional: []Painful  [x]Non-Painful R shoulder superior glide, scapular elevation       FUNCTIONAL MOVEMENT PATTERNS  Movement Analysis Notes   Bridge hold for 30s []Functional  [x]Dysfunctional:  []Painful  [x]Non-Painful Impaired R glute control   Single leg squat []Functional  [x]Dysfunctional:  []Painful  [x]Non-Painful B mild valgus, B foot pronation       MOTOR CONTROL TESTS:    Right  (spine) Left  Goal   Lower extremity      Prone knee bend [x] Positive  [] Negative  Comments: [] Positive  [x] Negative  Comments: Negative B           Upper Limb Neurodynamic testing:   Right Left   ULTT 1 (Median) + at 70 deg (ulnar symptoms) + at 70 deg (ulnar symptoms)   ULTT 2B (Radial) + at 30 deg + at 30 deg   ULTT 3 (Ulnar) + +          RANGE OF MOTION:   Cervical Right   (spine) Left    Pain/Dysfunction with Movement Goal   Cervical Flexion (60º) 75% ---     Cervical Extension (80º) 75% ---     Cervical Side Bending (45º) 50% 50%     Cervical Rotation (75º) 50% 75%         Shoulder AROM/PROM  Right Left Pain/Dysfunction with Movement Goal   Shoulder Flexion (180º) 150 160 R shrug    Shoulder Abduction (180º)       Shoulder Extension (60º)       Shoulder ER  at 90º (90º) 95 95     Shoulder IR at 90º (70º) 60 60     Functional ER       Functional IR           Hip AROM/PROM Right Left Pain/Dysfunction with Movement Goal   Hip Flexion (120º) 110 120     Hip Extension (30º) 5 5     Hip Abduction (45º) 35 35     Hip IR (45º) 20 20     Hip ER (45º) 35 35            STRENGTH:     L/E MMT Right  (spine) Left Pain/Dysfunction with Movement Goal   Hip Extension  4/5 4+/5  4+/5 B   Hip Abduction  4/5 4+/5 Lateral hip pain 4+/5 B         MUSCLE LENGTH:   Muscle Tested  Right Left  Limitation Goal   Latisimus dorsi [] Normal  [x] Limited [x] Normal  [] Limited  Normal B   Pectoralis Minor [] Normal  [x] Limited [x] Normal  [] Limited  Normal B      Muscle Tested  Right Left  Limitation Goal   Hip Flexors [] Normal  [x] Limited [] Normal  [x] Limited  Normal B   ITB / TFL [] Normal  [x] Limited [] Normal  [x] Limited  Normal B       Joint mobility:  Impaired R posterior GH mobility  Impaired R knee tibial anterior glide  Impaired mid thoracic mobility      SENSATION  [x] Intact to Light Touch   [] Impaired:      PALPATION: Structures: Increased tenderness to palpation of: bilateral Proximal to mid medial tibia      Function:     Intake Outcome Measure for FOTO NT Survey    Therapist reviewed FOTO scores for Sacha Fregoso on 11/26/2024.   FOTO documents entered into [employer] - see Media section.    Intake Score: NT%         Treatment     Total Treatment time (time-based codes) separate from Evaluation: (15) minutes     Sacha received the treatments listed below:      CPT Code Intervention Date/Notes  11/26   MT Manual Therapy Supine/prone thoracic gapping Gr5   NR Ulnar nerve flossing 20x   NR Median nerve floss 20x   NR SL hip abduction isometric YTB 5 x 30s     05 minutes of Manual therapy (MT) to improve  pain and ROM. (79950)  00 minutes of Therapeutic Exercise (TE) to develop strength, endurance, ROM, and flexibility. (68805)  10 minutes of Neuromuscular Re-Education (NMR)  to improve: Balance, Coordination, Kinesthetic, Sense, Proprioception, and Posture. (42189)  00 minutes of Therapeutic Activities (TA) to improve functional performance. (02508)  Unattended Electrical Stimulation (ES) for muscle performance and/or pain modulation. (22833)  00 Minutes of Vasopneumatic Device Therapy () for management of swelling/edema. (81169)  BFR: Blood flow restriction applied during exercise  NP: Not Performed    Patient Education and Home Exercises     Education provided: (10) minutes  Physical therapy diagnosis, prognosis, and plan of care.    Activity Modifications: Avoiding compressive loading of the lateral hip    Written Home Exercises Provided: yes.  Exercises were reviewed and Sacha was able to demonstrate them prior to the end of the session.  Sacha demonstrated good  understanding of the education provided. See EMR under Patient Instructions for exercises provided during therapy sessions.    Assessment     Sacha is a 44 y.o. male referred to outpatient Physical Therapy with a medical diagnosis of Neck pain [M54.2], Chronic right shoulder pain [M25.511, G89.29], Anterior shin splints [S86.392Q] . Clinical exam is consistent with B medial tibial stress syndrome, R rotator cuff related shoulder pain, ulnar adverse neural tissue tension with cervical dysfunction, R gluteal tendinopathy    Problem list:  R humeral anterior glide  R scapular abdcution, internal rotation,   B ankle pronation  R hip flexion adduction  Cervical extension/rotation    Pt prognosis is Good  Pt will benefit from skilled outpatient Physical Therapy to address the deficits stated above and in the chart below, provide pt/family education, and to maximize pt's level of independence.     Plan of care discussed with patient: Yes  Pt's  spiritual, cultural and educational needs considered and patient is agreeable to the plan of care and goals as stated below:     Anticipated Barriers for therapy: co-morbidities and chronicity of condition    Medical Necessity is demonstrated by the following   History  Co-morbidities and personal factors that may impact the plan of care [] LOW: no personal factors / co-morbidities  [x] MODERATE: 1-2 personal factors / co-morbidities  [] HIGH: 3+ personal factors / co-morbidities    Moderate / High Support Documentation: See patient medical history and objective assessment     Examination  Body Structures and Functions, activity limitations and participation restrictions that may impact the plan of care [] LOW: addressing 1-2 elements  [x] MODERATE: 3+ elements  [] HIGH: 4+ elements (please support below)    Moderate / High Support Documentation: See patient medical history and objective assessment     Clinical Presentation [] LOW: stable  [x] MODERATE: Evolving  [] HIGH: Unstable     Decision Making/ Complexity Score: moderate         Short Term Goals:  6 weeks Status  Date Met   PAIN: Pt will report worst pain of 2/10 in order to progress toward max functional ability and improve quality of life. [x] Progressing  [] Met  [] Not Met    FUNCTION: Patient will demonstrate improved function as indicated by a functional score improvement of at least 5 points on FOTO. [x] Progressing  [] Met  [] Not Met    MOBILITY: Patient will improve AROM to 50% of stated goals, listed in objective measures above, in order to progress towards independence with functional activities.  [x] Progressing  [] Met  [] Not Met    STRENGTH: Patient will improve strength to 50% of stated goals, listed in objective measures above, in order to progress towards independence with functional activities. [x] Progressing  [] Met  [] Not Met    POSTURE: Patient will correct postural deviations in sitting and standing, to decrease pain and promote long  term stability.  [x] Progressing  [] Met  [] Not Met    HEP: Patient will demonstrate independence with HEP in order to progress toward functional independence. [x] Progressing  [] Met  [] Not Met      Long Term Goals:  12 weeks Status Date Met   PAIN: Pt will report worst pain of 1/10 in order to progress toward max functional ability and improve quality of life [x] Progressing  [] Met  [] Not Met    FUNCTION: Patient will demonstrate improved function as indicated by a FOTO functional score improvement as listed in header. [x] Progressing  [] Met  [] Not Met    MOBILITY: Patient will improve AROM to stated goals, listed in objective measures above, in order to return to maximal functional potential and improve quality of life.  [x] Progressing  [] Met  [] Not Met    STRENGTH: Patient will improve strength to stated goals, listed in objective measures above, in order to improve functional independence and quality of life.  [x] Progressing  [] Met  [] Not Met    GAIT: Patient will demonstrate normalized gait mechanics with minimal compensation in order to return to PLOF. [x] Progressing  [] Met  [] Not Met    Patient will return to normal ADL's, IADL's, community involvement, recreational activities, and work-related activities with less than or equal to 1/10 pain and maximal function.  [x] Progressing  [] Met  [] Not Met      Plan   Plan of care Certification: 11/26/2024 to 2/26/24.    Outpatient Physical Therapy 2 times weekly for 12 weeks to include any combination of the following interventions: virtual visits, dry needling, modalities, electrical stimulation (IFC, Pre-Mod, Attended with Functional Dry Needling), Cervical/Lumbar Traction, Gait Training, Manual Therapy, Neuromuscular Re-ed, Patient Education, Self Care, Therapeutic Exercise, and Therapeutic Activites     Jv Pedro, PT, DPT      I CERTIFY THE NEED FOR THESE SERVICES FURNISHED UNDER THIS PLAN OF TREATMENT AND WHILE UNDER MY CARE   Physician's  comments:     Physician's Signature: ___________________________________________________

## 2024-11-27 ENCOUNTER — OFFICE VISIT (OUTPATIENT)
Dept: SPORTS MEDICINE | Facility: CLINIC | Age: 44
End: 2024-11-27
Payer: OTHER GOVERNMENT

## 2024-11-27 VITALS — HEIGHT: 71 IN | WEIGHT: 179.88 LBS | BODY MASS INDEX: 25.18 KG/M2

## 2024-11-27 DIAGNOSIS — G89.29 CHRONIC RIGHT SHOULDER PAIN: ICD-10-CM

## 2024-11-27 DIAGNOSIS — S49.91XA INJURY OF RIGHT GLENOID LABRUM: Primary | ICD-10-CM

## 2024-11-27 DIAGNOSIS — M75.41 SUBACROMIAL IMPINGEMENT OF RIGHT SHOULDER: ICD-10-CM

## 2024-11-27 DIAGNOSIS — M25.511 CHRONIC RIGHT SHOULDER PAIN: ICD-10-CM

## 2024-11-27 DIAGNOSIS — M54.2 NECK PAIN: ICD-10-CM

## 2024-11-27 PROCEDURE — 99999 PR PBB SHADOW E&M-EST. PATIENT-LVL III: CPT | Mod: PBBFAC,,, | Performed by: STUDENT IN AN ORGANIZED HEALTH CARE EDUCATION/TRAINING PROGRAM

## 2024-11-27 PROCEDURE — 99213 OFFICE O/P EST LOW 20 MIN: CPT | Mod: PBBFAC | Performed by: STUDENT IN AN ORGANIZED HEALTH CARE EDUCATION/TRAINING PROGRAM

## 2024-11-27 NOTE — PROGRESS NOTES
Orthopaedics Sports Medicine     Shoulder Initial Visit         11/27/2024    Referring MD: Zuri Knowles MD    Chief Complaint   Patient presents with    Right Shoulder - Pain       History of Present Illness:   History of Present Illness    CHIEF COMPLAINT:  - Sacha presents today for initial evaluation of right shoulder pain that has been ongoing for the past few months.    HPI:  Sacha presents with right shoulder pain ongoing for a few months, aggravated by exercise, particularly overhead weights and bench press. He reports persistent shoulder pain. A throbbing pain sometimes occurs while sitting and occasionally wakes him up at night when sleeping on that side. The onset was gradual, with no specific injury reported.    He works in the , primarily doing computer work during the day and participating in [employer]-type exercises in the morning, though he has slowed down due to knee pain in recent years.    He has been seen by Dr. Knowles, who suggested an X-ray and mentioned it might be arthritis pain. He also saw Jv for physical therapy, who suggested it could be tendonitis. He was in physical therapy from February to September of this year, took a break when Jv went on leave, and has recently restarted.    Certain movements exacerbate the pain, including raising the arm overhead, moving it out to the side, and crossing it over the upper body. He describes feeling a tight pull in the shoulder area during these movements.    He takes diclofenac as needed for his knees, which was prescribed previously.    Sacha denies any history of shoulder dislocation.    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     GERD (gastroesophageal reflux disease)     Seasonal allergies        Past Surgical History:   Past Surgical History:   Procedure Laterality Date    COLONOSCOPY      COLONOSCOPY N/A 12/27/2023    Procedure: COLONOSCOPY;  Surgeon: Jennifer Mcfarland MD;  Location: [facility];  Service: Endoscopy;  Laterality: N/A;    ESOPHAGOGASTRODUODENOSCOPY N/A 7/24/2024    Procedure: EGD (ESOPHAGOGASTRODUODENOSCOPY);  Surgeon: Magi Roth MD;  Location: [facility];  Service: Gastroenterology;  Laterality: N/A;    VASECTOMY      wisdoom teeth         Medications:  Patient's Medications   New Prescriptions    No medications on file   Previous Medications    DICLOFENAC (VOLTAREN) 75 MG EC TABLET    Take 1 tablet (75 mg total) by mouth 2 (two) times daily with meals.    FAMOTIDINE (PEPCID) 20 MG TABLET    Take 1 tablet (20 mg total) by mouth 2 (two) times daily.    IBUPROFEN (ADVIL,MOTRIN) 200 MG TABLET    Take 200 mg by mouth every 6 (six) hours as needed for Pain.   Modified Medications    No medications on file   Discontinued Medications    No medications on file       Allergies: Review of patient's allergies indicates:  No Known Allergies    Social History:   Home town: [place], [place]  Occupation:   Alcohol use: He reports current alcohol use of about 1.0 - 2.0 standard drink of alcohol per week.  Tobacco use: He reports that he has quit smoking. His smoking use included cigarettes. He has never been exposed to tobacco smoke. He has quit using smokeless tobacco.  His smokeless tobacco use included chew.    Review of systems:  History of recent illness, fevers, shakes, or chills: no  History of cardiac problems or chest pain: no  History of pulmonary problems or asthma: no  History of diabetes: no  History of prior dvt or clotting  "problems: no  History of sleep apnea: no      Physical Examination:  Estimated body mass index is 25.09 kg/m² as calculated from the following:    Height as of this encounter: 5' 11" (1.803 m).    Weight as of this encounter: 81.6 kg (179 lb 14.3 oz).    General  Healthy appearing male in no acute distress  Alert and oriented, normal mood, appropriate affect    Shoulder Examination:  Patient is alert and oriented, no distress. Skin is intact. Neuro is normal with no focal motor or sensory findings.    Cervical exam is unremarkable. Intact cervical ROM. Negative Spurling's test    Physical Exam:  RIGHT    LEFT    Scap Dyskinesis/Winging (-)    (-)    Tenderness:          Greater Tuberosity             +    (-)  Bicipital Groove  +    (-)  AC joint   (-)    (-)  Other:         ROM:  Forward Elevation 160    180  Abduction  120    120  ER (at side)  80    80  IR   T8    T8    Strength:   Supraspinatus  5/5    5/5  Infraspinatus  5/5    5/5  Subscap / IR  5/5    5/5     Special Tests:   Neer:    (-)    (-)   Calderon:   (-)    (-)   SS Stress:   (-)    (-)   Bear Hug:   (-)    (-)   [place]'s:   +    (-)   Resisted Thrower's:   (-)    (-)   Speed's   +    (-)   Cross Arm Abduction:  +    (-)    Neurovascular examination  - Motor grossly intact bilaterally to shoulder abduction, elbow flexion and extension, wrist flexion and extension, and intrinsic hand musculature  - Sensation intact to light touch bilaterally in axillary, median, radial, and ulnar distributions  - Symmetrical radial pulses      Imaging:  XR Results:  Results for orders placed during the hospital encounter of 11/13/24    X-ray Shoulder 2 or More Views Right    Narrative  EXAM: XR SHOULDER COMPLETE 2 OR MORE VIEWS RIGHT    CLINICAL HISTORY: Right shoulder pain.    FINDINGS: Glenohumeral and acromioclavicular alignment is normal. No fracture or other acute osseous abnormality is seen.  Mild AC joint degenerative change.  No evidence of rotator cuff or " bursal calcium deposition.    Impression  No significant radiographic abnormality of the right shoulder.    Finalized on: 11/13/2024 4:36 PM By:  Rasheed Sutton MD  R# [id number]      2024-11-13 16:[id number]    [facility]      MRI Results:  No results found for this or any previous visit.      CT Results:  No results found for this or any previous visit.      Physician Read: I agree with the above impression. Good joint space, Degenerative changes at the AC joint (between acromion and clavicle), Lateral down sloping of the acromion      Impression:  44 y.o. male with right shoulder pain and dysfunction consistent with glenoid labral pathology and subacromial impingement.          Plan:  Discussed diagnosis and treatment options with patient today.  His symptoms are most consistent with superior labral pathology and subacromial impingement.  I recommend scheduled oral anti-inflammatory for 3-4 weeks.  He has a prescription for diclofenac so he will use this.  He has been taking intermittently and so I would like to make sure he does it daily.  He has started physical therapy.  I would like him to continue with PT to work on periscapular strengthening.    Discussed potential future procedures including injection if symptoms do not improve with current treatment plan.  We will also consider MRI if symptoms have not improved after 6 weeks of the current treatment.  Follow up in 6-8 weeks to assess progress and determine if further intervention is needed.            Monty Danielle MD    I, Mahsa eRsendiz, acted as a scribe for Monty Danielle MD for the duration of this office visit.    This note was generated with the assistance of ambient listening technology. Verbal consent was obtained by the patient and accompanying visitor(s) for the recording of patient appointment to facilitate this note. I attest to having reviewed and edited the generated note for accuracy, though some syntax or spelling errors  may persist. Please contact the author of this note for any clarification.

## 2024-12-10 ENCOUNTER — CLINICAL SUPPORT (OUTPATIENT)
Facility: HOSPITAL | Age: 44
End: 2024-12-10
Payer: OTHER GOVERNMENT

## 2024-12-10 DIAGNOSIS — M17.11 PRIMARY OSTEOARTHRITIS OF RIGHT KNEE: ICD-10-CM

## 2024-12-10 DIAGNOSIS — M25.511 CHRONIC RIGHT SHOULDER PAIN: ICD-10-CM

## 2024-12-10 DIAGNOSIS — G89.29 CHRONIC RIGHT SHOULDER PAIN: ICD-10-CM

## 2024-12-10 DIAGNOSIS — S86.899A ANTERIOR SHIN SPLINTS: ICD-10-CM

## 2024-12-10 DIAGNOSIS — M25.551 RIGHT HIP PAIN: ICD-10-CM

## 2024-12-10 DIAGNOSIS — M22.2X1 PATELLOFEMORAL PAIN SYNDROME OF RIGHT KNEE: Primary | ICD-10-CM

## 2024-12-10 PROCEDURE — 97112 NEUROMUSCULAR REEDUCATION: CPT | Mod: PN | Performed by: PHYSICAL THERAPIST

## 2024-12-10 PROCEDURE — 97110 THERAPEUTIC EXERCISES: CPT | Mod: PN | Performed by: PHYSICAL THERAPIST

## 2024-12-10 PROCEDURE — 97140 MANUAL THERAPY 1/> REGIONS: CPT | Mod: PN | Performed by: PHYSICAL THERAPIST

## 2024-12-10 NOTE — PROGRESS NOTES
[facility]   Physical Therapy Treatment Note      Name: Sacha Fregoso  Clinic Number: [id number]    Therapy Diagnosis:   Encounter Diagnoses   Name Primary?    Patellofemoral pain syndrome of right knee Yes    Primary osteoarthritis of right knee     Right hip pain     Anterior shin splints     Chronic right shoulder pain      Physician: Zuri Knowles MD    Physician Orders: PT Eval and Treat   Medical Diagnosis from Referral: Neck pain [M54.2], Chronic right shoulder pain [M25.511, G89.29], Anterior shin splints [S86.899A]   Evaluation Date: 11/26/2024  Authorization Period Expiration: 12/31/24  Plan of Care Expiration: 2/26/25  Progress Note Due: 12/26/24  Visit # / Visits authorized: 1/ 15     Time In: 7:30  Time Out: 8:40  Total Billable Time: 40 minutes Billing reflects 1:1 direct supervision    MD follow-up:    Precautions: Standard    FOTO:  Goal: 73%  -Intake: 67%  -Status: incomplete  -Discharge: incomplete    Subjective     Pt reports: S 12/10 - Saw Dr. Danielle who diagnosed him with superior labral pain and tennis elbow. Dr. Danielle recommended voltaren every day. .  He was compliant with home exercise program.  Response to previous treatment: Improving symptoms  Functional change: Gradually improving function    Pain: Moderate/10  Location: R shoulder, R hip, B shins, lateral elbow    Objective      Objective Measures updated at progress report unless specified otherwise.    Problem list:  R humeral anterior glide  R scapular abdcution, internal rotation,   B ankle pronation  R hip flexion adduction  Cervical extension/rotation     strength by side  L- 115  R- 105 pain  R- 125 mild pain post lateral glide   strength elbow extended  L- 120  R- 120      STRENGTH:   U/E MMT Right Left Pain/Dysfunction with Movement Goal   Shoulder Scaption 51.1 65.2  4+/5 B   Shoulder IR 43.6 47.7# R pain 4+/5 B   Shoulder ER 32.5 33# R pain 4+/5 B   Shoulder IR at 90 38.4 28.3  R pain    Shoulder ER at 90 25 25.8     Middle Trapezius 25.9# 28# Resisted prox to elbow 4+/5 B   Lower Trapezius 17.3# 21.9#  4+/5 B          Treatment     Sacha received the treatments listed below:      CPT Code Intervention Date/Notes  12/10   MT Manual Therapy Supine/seatedthoracic gapping Gr5  Elbow lateral glide MWM  Radiohumeral gapping Gr5   TE Strength testing and assessment 15 minutes   TE* Wrist extensor stretch 3 x 30s   TE* DB wrist extension 5# 3 x 12 slow   TE* DB pronation/supination 2# pronator 3 x 12 slow   NR SL hip abduction isometric Belt 5 x 30s   NR SL hip thrust 30# 3 x 10   TE* Lateral band walk upright Red 3 x 10 yds      15 minutes of Manual therapy (MT) to improve pain and ROM. (22925)  10 minutes of Neuromuscular Re-Education (NMR)  to improve: Balance, Coordination, Kinesthetic, Sense, Proprioception, and Posture. (87131)  00 minutes of Therapeutic Activities (TA) to improve functional performance. (00261)  15 minutes of Therapeutic Exercise (TE) to develop strength, endurance, ROM, and flexibility. (32256)  00 minutes of Unattended Electrical Stimulation (ES) for muscle performance and/or pain modulation. (24680)  00 Minutes of Vasopneumatic Device Therapy () for management of swelling/edema. (62236)  BFR: Blood flow restriction applied during exercise  NP: Not Performed      Patient Education and Home Exercises         Education provided:   Physical therapy diagnosis, prognosis, and plan of care.     Written Home Exercises Provided: Patient instructed to cont prior HEP.  Exercises were reviewed and Sacha was able to demonstrate them prior to the end of the session.  Sacha demonstrated good  understanding of the education provided.     See EMR under Patient Instructions for exercises provided prior visit.    Assessment     A 12/10- Pt noted to have signs and symptoms consistent with lateral epicondylalgia on the R. Improved  strength with mulligan lateral glide  technique. He is noted to have impaired shoulder strength as well which likely contributes to continued shoulder dysfunction. We will continue to progress exercises to improve pain and maximize function.    Sacha Is progressing well towards his goals.   Pt prognosis is Good.     Pt will continue to benefit from skilled outpatient physical therapy to address the deficits listed in the problem list box on initial evaluation, provide pt/family education and to maximize pt's level of independence in the home and community environment.     Pt's spiritual, cultural and educational needs considered and pt agreeable to plan of care and goals.    Anticipated barriers to physical therapy: co-morbidities and chronicity of condition     Goals:     Short Term Goals:  6 weeks Status  Date Met   PAIN: Pt will report worst pain of 2/10 in order to progress toward max functional ability and improve quality of life. [x] Progressing  [] Met  [] Not Met     FUNCTION: Patient will demonstrate improved function as indicated by a functional score improvement of at least 5 points on FOTO. [x] Progressing  [] Met  [] Not Met     MOBILITY: Patient will improve AROM to 50% of stated goals, listed in objective measures above, in order to progress towards independence with functional activities.  [x] Progressing  [] Met  [] Not Met     STRENGTH: Patient will improve strength to 50% of stated goals, listed in objective measures above, in order to progress towards independence with functional activities. [x] Progressing  [] Met  [] Not Met     POSTURE: Patient will correct postural deviations in sitting and standing, to decrease pain and promote long term stability.  [x] Progressing  [] Met  [] Not Met     HEP: Patient will demonstrate independence with HEP in order to progress toward functional independence. [x] Progressing  [] Met  [] Not Met        Long Term Goals:  12 weeks Status Date Met   PAIN: Pt will report worst pain of 1/10 in  order to progress toward max functional ability and improve quality of life [x] Progressing  [] Met  [] Not Met     FUNCTION: Patient will demonstrate improved function as indicated by a FOTO functional score improvement as listed in header. [x] Progressing  [] Met  [] Not Met     MOBILITY: Patient will improve AROM to stated goals, listed in objective measures above, in order to return to maximal functional potential and improve quality of life.  [x] Progressing  [] Met  [] Not Met     STRENGTH: Patient will improve strength to stated goals, listed in objective measures above, in order to improve functional independence and quality of life.  [x] Progressing  [] Met  [] Not Met     GAIT: Patient will demonstrate normalized gait mechanics with minimal compensation in order to return to PLOF. [x] Progressing  [] Met  [] Not Met     Patient will return to normal ADL's, IADL's, community involvement, recreational activities, and work-related activities with less than or equal to 1/10 pain and maximal function.  [x] Progressing  [] Met  [] Not Met          Plan       Continue to progress per protocol and per patient tolerance      Jv Pedro, PT, DPT

## 2024-12-12 ENCOUNTER — CLINICAL SUPPORT (OUTPATIENT)
Facility: HOSPITAL | Age: 44
End: 2024-12-12
Attending: PHYSICAL THERAPIST
Payer: OTHER GOVERNMENT

## 2024-12-12 DIAGNOSIS — G89.29 CHRONIC RIGHT SHOULDER PAIN: ICD-10-CM

## 2024-12-12 DIAGNOSIS — M22.2X1 PATELLOFEMORAL PAIN SYNDROME OF RIGHT KNEE: Primary | ICD-10-CM

## 2024-12-12 DIAGNOSIS — S86.899A ANTERIOR SHIN SPLINTS: ICD-10-CM

## 2024-12-12 DIAGNOSIS — M25.551 RIGHT HIP PAIN: ICD-10-CM

## 2024-12-12 DIAGNOSIS — M25.511 CHRONIC RIGHT SHOULDER PAIN: ICD-10-CM

## 2024-12-12 DIAGNOSIS — M17.11 PRIMARY OSTEOARTHRITIS OF RIGHT KNEE: ICD-10-CM

## 2024-12-12 PROCEDURE — 97110 THERAPEUTIC EXERCISES: CPT | Mod: PN | Performed by: PHYSICAL THERAPIST

## 2024-12-12 PROCEDURE — 97112 NEUROMUSCULAR REEDUCATION: CPT | Mod: PN | Performed by: PHYSICAL THERAPIST

## 2024-12-12 PROCEDURE — 97140 MANUAL THERAPY 1/> REGIONS: CPT | Mod: PN | Performed by: PHYSICAL THERAPIST

## 2024-12-12 NOTE — PROGRESS NOTES
[facility]   Physical Therapy Treatment Note      Name: Sacha Fregoso  Clinic Number: [id number]    Therapy Diagnosis:   Encounter Diagnoses   Name Primary?    Patellofemoral pain syndrome of right knee Yes    Primary osteoarthritis of right knee     Right hip pain     Anterior shin splints     Chronic right shoulder pain      Physician: Zuri Knowles MD    Physician Orders: PT Eval and Treat   Medical Diagnosis from Referral: Neck pain [M54.2], Chronic right shoulder pain [M25.511, G89.29], Anterior shin splints [S86.899A]   Evaluation Date: 11/26/2024  Authorization Period Expiration: 12/31/24  Plan of Care Expiration: 2/26/25  Progress Note Due: 12/26/24  Visit # / Visits authorized: 2 / 15     Time In: 7:25  Time Out: 8:30  Total Billable Time: 65 minutes Billing reflects 1:1 direct supervision    MD follow-up:    Precautions: Standard    FOTO:  Goal: 73%  -Intake: 67%  -Status: incomplete  -Discharge: incomplete    Subjective     Pt reports: S 12/10 - Saw Dr. Danielle who diagnosed him with superior labral pain and tennis elbow. Dr. Danielle recommended voltaren every day. .  He was compliant with home exercise program.  Response to previous treatment: Improving symptoms  Functional change: Gradually improving function    Pain: Moderate/10  Location: R shoulder, R hip, B shins, lateral elbow    Objective      Objective Measures updated at progress report unless specified otherwise.    Problem list:  R humeral anterior glide  R scapular abdcution, internal rotation,   B ankle pronation  R hip flexion adduction  Cervical extension/rotation     strength by side  L- 115  R- 105 pain  R- 125 mild pain post lateral glide   strength elbow extended  L- 120  R- 120      STRENGTH:   U/E MMT Right Left Pain/Dysfunction with Movement Goal   Shoulder Scaption 51.1 65.2  4+/5 B   Shoulder IR 43.6 47.7# R pain 4+/5 B   Shoulder ER 32.5 33# R pain 4+/5 B   Shoulder IR at 90 38.4  28.3 R pain    Shoulder ER at 90 25 25.8     Middle Trapezius 25.9# 28# Resisted prox to elbow 4+/5 B   Lower Trapezius 17.3# 21.9#  4+/5 B          Treatment     Sacha received the treatments listed below:      CPT Code Intervention Date/Notes 12/12   MT Manual Therapy  Sidelying lumbar manipulation    Posterior GH mobs    Mills manipulation    Radial head supination MWM    Sidelying scapular upward rotation mobilization   TE Ulnar nerve flossing 20x   TE Median nerve floss 20x   NR Wrist Extension 3#, 3xfatigue   NR Tennis Elbow Stretch 3x30s   NR Shoulder Extensions GTB, 3x10   NR Shoulder ER/IR GTB, 3x10   NR SS:  Lateral Band Walks  Calf Raises  Tib Raises    Green cuff  10x  10x     15 minutes of Manual therapy (MT) to improve pain and ROM. (00157)  10 minutes of Therapeutic Exercise (TE) to develop strength, endurance, ROM, and flexibility. (80622)  30 minutes of Neuromuscular Re-Education (NR)  to improve: Balance, Coordination, Kinesthetic, Sense, Proprioception, and Posture. (33158)  0 minutes of Therapeutic Activities (TA) to improve functional performance. (49806)  Unattended Electrical Stimulation (ES) for muscle performance and/or pain modulation. (79071)  Vasopneumatic Device Therapy () for management of swelling/edema. (12047)  BFR: Blood flow restriction applied during exercise  NP: Not Performed         Patient Education and Home Exercises         Education provided:   Physical therapy diagnosis, prognosis, and plan of care.     Written Home Exercises Provided: Patient instructed to cont prior HEP.  Exercises were reviewed and Sacha was able to demonstrate them prior to the end of the session.  Sacha demonstrated good  understanding of the education provided.     See EMR under Patient Instructions for exercises provided prior visit.    Assessment     A 12/10- Pt noted to have signs and symptoms consistent with lateral epicondylalgia on the R. Improved  strength with aníbal  lateral glide technique. He is noted to have impaired shoulder strength as well which likely contributes to continued shoulder dysfunction. We will continue to progress exercises to improve pain and maximize function.    Sacha Is progressing well towards his goals.   Pt prognosis is Good.     Pt will continue to benefit from skilled outpatient physical therapy to address the deficits listed in the problem list box on initial evaluation, provide pt/family education and to maximize pt's level of independence in the home and community environment.     Pt's spiritual, cultural and educational needs considered and pt agreeable to plan of care and goals.    Anticipated barriers to physical therapy: co-morbidities and chronicity of condition     Goals:     Short Term Goals:  6 weeks Status  Date Met   PAIN: Pt will report worst pain of 2/10 in order to progress toward max functional ability and improve quality of life. [x] Progressing  [] Met  [] Not Met     FUNCTION: Patient will demonstrate improved function as indicated by a functional score improvement of at least 5 points on FOTO. [x] Progressing  [] Met  [] Not Met     MOBILITY: Patient will improve AROM to 50% of stated goals, listed in objective measures above, in order to progress towards independence with functional activities.  [x] Progressing  [] Met  [] Not Met     STRENGTH: Patient will improve strength to 50% of stated goals, listed in objective measures above, in order to progress towards independence with functional activities. [x] Progressing  [] Met  [] Not Met     POSTURE: Patient will correct postural deviations in sitting and standing, to decrease pain and promote long term stability.  [x] Progressing  [] Met  [] Not Met     HEP: Patient will demonstrate independence with HEP in order to progress toward functional independence. [x] Progressing  [] Met  [] Not Met        Long Term Goals:  12 weeks Status Date Met   PAIN: Pt will report worst pain  of 1/10 in order to progress toward max functional ability and improve quality of life [x] Progressing  [] Met  [] Not Met     FUNCTION: Patient will demonstrate improved function as indicated by a FOTO functional score improvement as listed in header. [x] Progressing  [] Met  [] Not Met     MOBILITY: Patient will improve AROM to stated goals, listed in objective measures above, in order to return to maximal functional potential and improve quality of life.  [x] Progressing  [] Met  [] Not Met     STRENGTH: Patient will improve strength to stated goals, listed in objective measures above, in order to improve functional independence and quality of life.  [x] Progressing  [] Met  [] Not Met     GAIT: Patient will demonstrate normalized gait mechanics with minimal compensation in order to return to PLOF. [x] Progressing  [] Met  [] Not Met     Patient will return to normal ADL's, IADL's, community involvement, recreational activities, and work-related activities with less than or equal to 1/10 pain and maximal function.  [x] Progressing  [] Met  [] Not Met          Plan       Continue to progress per protocol and per patient tolerance      Trav Ruff, PT, DPT

## 2024-12-17 ENCOUNTER — CLINICAL SUPPORT (OUTPATIENT)
Facility: HOSPITAL | Age: 44
End: 2024-12-17
Payer: OTHER GOVERNMENT

## 2024-12-17 DIAGNOSIS — M22.2X1 PATELLOFEMORAL PAIN SYNDROME OF RIGHT KNEE: Primary | ICD-10-CM

## 2024-12-17 DIAGNOSIS — M17.11 PRIMARY OSTEOARTHRITIS OF RIGHT KNEE: ICD-10-CM

## 2024-12-17 DIAGNOSIS — M25.511 CHRONIC RIGHT SHOULDER PAIN: ICD-10-CM

## 2024-12-17 DIAGNOSIS — G89.29 CHRONIC RIGHT SHOULDER PAIN: ICD-10-CM

## 2024-12-17 DIAGNOSIS — S86.899A ANTERIOR SHIN SPLINTS: ICD-10-CM

## 2024-12-17 DIAGNOSIS — M25.551 RIGHT HIP PAIN: ICD-10-CM

## 2024-12-17 PROCEDURE — 97110 THERAPEUTIC EXERCISES: CPT | Mod: PN | Performed by: PHYSICAL THERAPIST

## 2024-12-17 PROCEDURE — 97140 MANUAL THERAPY 1/> REGIONS: CPT | Mod: PN | Performed by: PHYSICAL THERAPIST

## 2024-12-17 PROCEDURE — 97112 NEUROMUSCULAR REEDUCATION: CPT | Mod: PN | Performed by: PHYSICAL THERAPIST

## 2024-12-17 NOTE — PROGRESS NOTES
[facility]   Physical Therapy Treatment Note      Name: Sacha Fregoso  Clinic Number: [id number]    Therapy Diagnosis:   Encounter Diagnoses   Name Primary?    Patellofemoral pain syndrome of right knee Yes    Primary osteoarthritis of right knee     Right hip pain     Anterior shin splints     Chronic right shoulder pain      Physician: Zuri Knowles MD    Physician Orders: PT Eval and Treat   Medical Diagnosis from Referral: Neck pain [M54.2], Chronic right shoulder pain [M25.511, G89.29], Anterior shin splints [S86.899A]   Evaluation Date: 11/26/2024  Authorization Period Expiration: 12/31/24  Plan of Care Expiration: 2/26/25  Progress Note Due: 12/26/24  Visit # / Visits authorized: 3 / 15     Time In: 7:25  Time Out: 8:45  Total Billable Time: 60 minutes Billing reflects 1:1 direct supervision    MD follow-up:    Precautions: Standard    FOTO:  Goal: 73%  -Intake: 67%  -Status: incomplete  -Discharge: incomplete    Subjective     Pt reports: 12/17- Shoulder and hip are feeling sore today.  He was compliant with home exercise program.  Response to previous treatment: Improving symptoms  Functional change: Gradually improving function    Pain: Moderate/10  Location: R shoulder, R hip, B shins, lateral elbow    Objective      Objective Measures updated at progress report unless specified otherwise.    Problem list:  R humeral anterior glide  R scapular abdcution, internal rotation,   B ankle pronation  R hip flexion adduction  Cervical extension/rotation      Treatment     Sacha received the treatments listed below:       CPT Code Intervention Date/Notes  12/17   MT Manual Therapy Supine/seatedthoracic gapping Gr5  GH centration  Scapular upward rotation   NR Prone T thumb up 5# 3 x 10   NR Prone W to Y 3 x 10   NR Landmine press 25# 3 x 12   TE Wrist extensor stretch 3 x 30s   TE DB wrist extension 5# 3 x 12 slow   TE DB pronation/supination 2# pronator 3 x 12 slow   NR  SL hip thrust 30# 3 x 10   TE Lateral band walk upright Red 3 x 10 yds    NR Front foot elevated heel raise Incline with 135# safety bar 3 x 10   NR Side plank full 3 x 45s ea     10 minutes of Manual therapy (MT) to improve pain and ROM. (39809)  30 minutes of Neuromuscular Re-Education (NMR)  to improve: Balance, Coordination, Kinesthetic, Sense, Proprioception, and Posture. (02232)  00 minutes of Therapeutic Activities (TA) to improve functional performance. (29185)  20 minutes of Therapeutic Exercise (TE) to develop strength, endurance, ROM, and flexibility. (16845)  00 minutes of Unattended Electrical Stimulation (ES) for muscle performance and/or pain modulation. (57027)  00 Minutes of Vasopneumatic Device Therapy () for management of swelling/edema. (92110)  BFR: Blood flow restriction applied during exercise  NP: Not Performed    Patient Education and Home Exercises         Education provided:   Physical therapy diagnosis, prognosis, and plan of care.     Written Home Exercises Provided: Patient instructed to cont prior HEP.  Exercises were reviewed and Sacha was able to demonstrate them prior to the end of the session.  Sacha demonstrated good  understanding of the education provided.     See EMR under Patient Instructions for exercises provided prior visit.    Assessment     12/17- The patient was noted to have some shoudler discomfort with prone edel COOPER due to increase in loading. Overall scapular stability is improving well. Good tolerance to exercise progression.    Sacha Is progressing well towards his goals.   Pt prognosis is Good.     Pt will continue to benefit from skilled outpatient physical therapy to address the deficits listed in the problem list box on initial evaluation, provide pt/family education and to maximize pt's level of independence in the home and community environment.     Pt's spiritual, cultural and educational needs considered and pt agreeable to plan of care  and goals.    Anticipated barriers to physical therapy: co-morbidities and chronicity of condition     Goals:     Short Term Goals:  6 weeks Status  Date Met   PAIN: Pt will report worst pain of 2/10 in order to progress toward max functional ability and improve quality of life. [x] Progressing  [] Met  [] Not Met     FUNCTION: Patient will demonstrate improved function as indicated by a functional score improvement of at least 5 points on FOTO. [x] Progressing  [] Met  [] Not Met     MOBILITY: Patient will improve AROM to 50% of stated goals, listed in objective measures above, in order to progress towards independence with functional activities.  [x] Progressing  [] Met  [] Not Met     STRENGTH: Patient will improve strength to 50% of stated goals, listed in objective measures above, in order to progress towards independence with functional activities. [x] Progressing  [] Met  [] Not Met     POSTURE: Patient will correct postural deviations in sitting and standing, to decrease pain and promote long term stability.  [x] Progressing  [] Met  [] Not Met     HEP: Patient will demonstrate independence with HEP in order to progress toward functional independence. [x] Progressing  [] Met  [] Not Met        Long Term Goals:  12 weeks Status Date Met   PAIN: Pt will report worst pain of 1/10 in order to progress toward max functional ability and improve quality of life [x] Progressing  [] Met  [] Not Met     FUNCTION: Patient will demonstrate improved function as indicated by a FOTO functional score improvement as listed in header. [x] Progressing  [] Met  [] Not Met     MOBILITY: Patient will improve AROM to stated goals, listed in objective measures above, in order to return to maximal functional potential and improve quality of life.  [x] Progressing  [] Met  [] Not Met     STRENGTH: Patient will improve strength to stated goals, listed in objective measures above, in order to improve functional independence and quality  of life.  [x] Progressing  [] Met  [] Not Met     GAIT: Patient will demonstrate normalized gait mechanics with minimal compensation in order to return to PLOF. [x] Progressing  [] Met  [] Not Met     Patient will return to normal ADL's, IADL's, community involvement, recreational activities, and work-related activities with less than or equal to 1/10 pain and maximal function.  [x] Progressing  [] Met  [] Not Met          Plan       Continue to progress per protocol and per patient tolerance      Jv Pedro, PT, DPT

## 2024-12-19 ENCOUNTER — CLINICAL SUPPORT (OUTPATIENT)
Facility: HOSPITAL | Age: 44
End: 2024-12-19
Payer: OTHER GOVERNMENT

## 2024-12-19 DIAGNOSIS — M17.11 PRIMARY OSTEOARTHRITIS OF RIGHT KNEE: ICD-10-CM

## 2024-12-19 DIAGNOSIS — M25.511 CHRONIC RIGHT SHOULDER PAIN: ICD-10-CM

## 2024-12-19 DIAGNOSIS — G89.29 CHRONIC RIGHT SHOULDER PAIN: ICD-10-CM

## 2024-12-19 DIAGNOSIS — M22.2X1 PATELLOFEMORAL PAIN SYNDROME OF RIGHT KNEE: Primary | ICD-10-CM

## 2024-12-19 DIAGNOSIS — M25.551 RIGHT HIP PAIN: ICD-10-CM

## 2024-12-19 DIAGNOSIS — S86.899A ANTERIOR SHIN SPLINTS: ICD-10-CM

## 2024-12-19 PROCEDURE — 97140 MANUAL THERAPY 1/> REGIONS: CPT | Mod: PN | Performed by: PHYSICAL THERAPIST

## 2024-12-19 PROCEDURE — 97112 NEUROMUSCULAR REEDUCATION: CPT | Mod: PN | Performed by: PHYSICAL THERAPIST

## 2024-12-19 NOTE — PROGRESS NOTES
[facility]   Physical Therapy Treatment Note      Name: Sacha Fregoso  Clinic Number: [id number]    Therapy Diagnosis:   Encounter Diagnoses   Name Primary?    Patellofemoral pain syndrome of right knee Yes    Primary osteoarthritis of right knee     Right hip pain     Anterior shin splints     Chronic right shoulder pain      Physician: Zuri Knowles MD    Physician Orders: PT Eval and Treat   Medical Diagnosis from Referral: Neck pain [M54.2], Chronic right shoulder pain [M25.511, G89.29], Anterior shin splints [S86.899A]   Evaluation Date: 11/26/2024  Authorization Period Expiration: 12/31/24  Plan of Care Expiration: 2/26/25  Progress Note Due: 12/26/24  Visit # / Visits authorized: 4 / 15     Time In: 7:28  Time Out: 8:40  Total Billable Time: 25 minutes Billing reflects 1:1 direct supervision    MD follow-up:    Precautions: Standard    FOTO:  Goal: 73%  -Intake: 67%  -Status: incomplete  -Discharge: incomplete    Subjective     Pt reports: 12/19- Ran 10k yesterday morning and then was on his feet a lot so his legs were sore.  He was compliant with home exercise program.  Response to previous treatment: Improving symptoms  Functional change: Gradually improving function    Pain: Moderate/10  Location: R shoulder, R hip, B shins, lateral elbow    Objective      Objective Measures updated at progress report unless specified otherwise.    Problem list:  R humeral anterior glide  R scapular abdcution, internal rotation,   B ankle pronation  R hip flexion adduction  Cervical extension/rotation      Treatment     Sacha received the treatments listed below:       CPT Code Intervention Date/Notes  12/19   MT Manual Therapy Supine/seatedthoracic gapping Gr5  GH centration  Scapular upward rotation   NR Prone T thumb up 5# 3 x 10   NR* Prone W to Y 3 x 10   NR* Landmine press 25# 3 x 12   TE* Wrist extensor stretch 3 x 30s   TE* DB wrist extension 5# 3 x 12 slow   TE* DB  pronation/supination 2# pronator 3 x 12 slow   NR* SL hip thrust 30# 3 x 10   TE* Lateral band walk upright Red 3 x 10 yds    NR Front foot elevated heel raise Incline with 135# safety bar 3 x 10   NR* Side plank full 3 x 45s ea     15 minutes of Manual therapy (MT) to improve pain and ROM. (14553)  10 minutes of Neuromuscular Re-Education (NMR)  to improve: Balance, Coordination, Kinesthetic, Sense, Proprioception, and Posture. (51440)  00 minutes of Therapeutic Activities (TA) to improve functional performance. (82295)  00 minutes of Therapeutic Exercise (TE) to develop strength, endurance, ROM, and flexibility. (00571)  00 minutes of Unattended Electrical Stimulation (ES) for muscle performance and/or pain modulation. (98767)  00 Minutes of Vasopneumatic Device Therapy () for management of swelling/edema. (60427)  BFR: Blood flow restriction applied during exercise  NP: Not Performed    Patient Education and Home Exercises         Education provided:   Physical therapy diagnosis, prognosis, and plan of care.     Written Home Exercises Provided: Patient instructed to cont prior HEP.  Exercises were reviewed and Sacha was able to demonstrate them prior to the end of the session.  Sacha demonstrated good  understanding of the education provided.     See EMR under Patient Instructions for exercises provided prior visit.    Assessment     12/19- Patient continues to show low symptoms with  strength testing. Continued shoulder pain, but progressing well with exercise.    Sacha Is progressing well towards his goals.   Pt prognosis is Good.     Pt will continue to benefit from skilled outpatient physical therapy to address the deficits listed in the problem list box on initial evaluation, provide pt/family education and to maximize pt's level of independence in the home and community environment.     Pt's spiritual, cultural and educational needs considered and pt agreeable to plan of care and  goals.    Anticipated barriers to physical therapy: co-morbidities and chronicity of condition     Goals:     Short Term Goals:  6 weeks Status  Date Met   PAIN: Pt will report worst pain of 2/10 in order to progress toward max functional ability and improve quality of life. [x] Progressing  [] Met  [] Not Met     FUNCTION: Patient will demonstrate improved function as indicated by a functional score improvement of at least 5 points on FOTO. [x] Progressing  [] Met  [] Not Met     MOBILITY: Patient will improve AROM to 50% of stated goals, listed in objective measures above, in order to progress towards independence with functional activities.  [x] Progressing  [] Met  [] Not Met     STRENGTH: Patient will improve strength to 50% of stated goals, listed in objective measures above, in order to progress towards independence with functional activities. [x] Progressing  [] Met  [] Not Met     POSTURE: Patient will correct postural deviations in sitting and standing, to decrease pain and promote long term stability.  [x] Progressing  [] Met  [] Not Met     HEP: Patient will demonstrate independence with HEP in order to progress toward functional independence. [x] Progressing  [] Met  [] Not Met        Long Term Goals:  12 weeks Status Date Met   PAIN: Pt will report worst pain of 1/10 in order to progress toward max functional ability and improve quality of life [x] Progressing  [] Met  [] Not Met     FUNCTION: Patient will demonstrate improved function as indicated by a FOTO functional score improvement as listed in header. [x] Progressing  [] Met  [] Not Met     MOBILITY: Patient will improve AROM to stated goals, listed in objective measures above, in order to return to maximal functional potential and improve quality of life.  [x] Progressing  [] Met  [] Not Met     STRENGTH: Patient will improve strength to stated goals, listed in objective measures above, in order to improve functional independence and quality of  life.  [x] Progressing  [] Met  [] Not Met     GAIT: Patient will demonstrate normalized gait mechanics with minimal compensation in order to return to PLOF. [x] Progressing  [] Met  [] Not Met     Patient will return to normal ADL's, IADL's, community involvement, recreational activities, and work-related activities with less than or equal to 1/10 pain and maximal function.  [x] Progressing  [] Met  [] Not Met          Plan       Continue to progress per protocol and per patient tolerance      Jv Pedro, PT, DPT

## 2024-12-28 NOTE — PROGRESS NOTES
Orthopaedic Follow-Up Visit    Last Appointment: 11/27/24  Diagnosis: right shoulder pain and dysfunction consistent with glenoid labral pathology and subacromial impingement   Prior Procedure: Diclofenac and PT      Sacha Fregoso is a 44 y.o. male who is here for f/u evaluation of his right shoulder. The patient was last seen here by me on 11/27/24 at which point his symptoms were most consistent with superior labral pathology and subacromial impingement. I recommended scheduled daily oral anti-inflammatory for 3-4 weeks and physical therapy to work on periscapular strengthening.  The patient returns today reporting that the symptoms have continued to persist in his shoulder despite continuing with physical therapy. He also reports some numbness in his 4th and 5th fingers on both hands.      To review his history, Sacha Fregoso is a 44 y.o. male who  presented on 11/27/24 with right shoulder pain that had been ongoing for a few months, aggravated by exercise, particularly overhead weights and bench press. He reported persistent shoulder pain. A throbbing pain sometimes occurred while sitting and occasionally would walk him up at night when sleeping on that side. The onset was gradual, with no specific injury reported. He had been referred to physical therapy by his PCP and was in physical therapy from February to September with a break while his PT was on leave but had recently restarted. Certain movements exacerbate the pain, including raising the arm overhead, moving it out to the side, and crossing it over the upper body. He described feeling a tight pull in the shoulder area during these movements. He denied any history of shoulder dislocation.    Treatment to date: Rest, activity modification, diclofenac, physical therapy (has been active for almost 1 year)    Patient's medications, allergies, past medical, surgical, social and family histories were reviewed and updated as  appropriate.    Review of Systems   All systems reviewed were negative.  Specifically, the patient denies fever, chills, weight loss, chest pain, shortness of breath, or dyspnea on exertion.      Past Medical History:   Diagnosis Date    Colon polyp 2008 2013 clear resume at 10 year interval.     GERD (gastroesophageal reflux disease)     Seasonal allergies        Past Surgical History:   Procedure Laterality Date    COLONOSCOPY      COLONOSCOPY N/A 12/27/2023    Procedure: COLONOSCOPY;  Surgeon: Jennifer Mcfarland MD;  Location: [facility];  Service: Endoscopy;  Laterality: N/A;    ESOPHAGOGASTRODUODENOSCOPY N/A 7/24/2024    Procedure: EGD (ESOPHAGOGASTRODUODENOSCOPY);  Surgeon: Magi Roth MD;  Location: [facility];  Service: Gastroenterology;  Laterality: N/A;    VASECTOMY      wisdoom teeth         Patient's Medications   New Prescriptions    No medications on file   Previous Medications    DICLOFENAC (VOLTAREN) 75 MG EC TABLET    Take 1 tablet (75 mg total) by mouth 2 (two) times daily with meals.    FAMOTIDINE (PEPCID) 20 MG TABLET    Take 1 tablet (20 mg total) by mouth 2 (two) times daily.    IBUPROFEN (ADVIL,MOTRIN) 200 MG TABLET    Take 200 mg by mouth every 6 (six) hours as needed for Pain.   Modified Medications    No medications on file   Discontinued Medications    No medications on file       Family History   Problem Relation Name Age of Onset    Diabetes Mother Mom     Hyperlipidemia Father Dad     No Known Problems Sister      No Known Problems Brother      No Known Problems Maternal Aunt      No Known Problems Maternal Uncle      No Known Problems Paternal Aunt      No Known Problems Paternal Uncle      No Known Problems Maternal Grandmother      No Known Problems Maternal Grandfather      COPD Paternal Grandmother Rissa     No Known Problems Paternal Grandfather      Cancer Paternal Aunt Viktoriya Stout        Review of patient's allergies indicates:  No Known Allergies      Objective:       Physical Exam  Patient is alert and oriented, no distress. Skin is intact. Neuro is normal with no focal motor or sensory findings.    Cervical exam is unremarkable. Intact cervical ROM. Negative Spurling's test    Physical Exam:                       RIGHT                                     LEFT     Scap Dyskinesis/Winging       (-)                                             (-)     Tenderness:                                                                              Greater Tuberosity                  +                                              (-)  Bicipital Groove                       +                                              (-)  AC joint                                   (-)                                             (-)  Other:                                           ROM:  Forward Elevation       160                                          180  Abduction                    120                                          120  ER (at side)                 80                                            80  IR                                 T8                                            T8     Strength:   Supraspinatus             5/5                                           5/5  Infraspinatus               5/5                                           5/5  Subscap / IR               5/5                                           5/5      Special Tests:              Neer:                                       (-)                                             (-)              Calderon:                                 (-)                                             (-)              SS Stress:                               (-)                                             (-)              Bear Hug:                                (-)                                             (-)              Lane's:                                 +                                              (-)               Resisted Thrower's:                (-)                                             (-)              Speed's                                   +                                              (-)              Cross Arm Abduction:             +                                              (-)    Positive ulnar nerve compression test   Positive tinel's at ulnar nerve     Neurovascular examination  - Motor grossly intact bilaterally to shoulder abduction, elbow flexion and extension, wrist flexion and extension, and intrinsic hand musculature  - Sensation intact to light touch bilaterally in axillary, median, radial, and ulnar distributions  - Symmetrical radial pulses    Imaging:    XR Results:  Results for orders placed during the hospital encounter of 11/13/24    X-ray Shoulder 2 or More Views Right    Narrative  EXAM: XR SHOULDER COMPLETE 2 OR MORE VIEWS RIGHT    CLINICAL HISTORY: Right shoulder pain.    FINDINGS: Glenohumeral and acromioclavicular alignment is normal. No fracture or other acute osseous abnormality is seen.  Mild AC joint degenerative change.  No evidence of rotator cuff or bursal calcium deposition.    Impression  No significant radiographic abnormality of the right shoulder.    Finalized on: 11/13/2024 4:36 PM By:  Rasheed Sutton MD  [facility]# [id number]      2024-11-13 16:[id number]    [facility]      MRI Results:  No results found for this or any previous visit.      CT Results:  No results found for this or any previous visit.      Physician read: I agree with the above impression.    Assessment/Plan:   Sacha Fregoso is a 44 y.o. male with right shoulder pain and dysfunction consistent with glenoid labral pathology and subacromial impingement, cubital tunnel syndrome     Plan:    He continues to have right shoulder symptoms consistent with glenoid labral pathology and these symptoms have persisted despite several months of physical therapy.  I recommend proceeding with MR Arthrogram of his right  shoulder for further evaluation for suspected labral pathology. The patient is in agreement with this plan.    He also has symptoms consistent with bilateral cubital tunnel. I recommend referral to our hand team for further evaluation and treatment of this issue.   Follow up with me after MRI.           Monty Danielle MD    I, Ryan Chappell, acted as a scribe for Monty Danielle MD for the duration of this office visit.

## 2025-01-01 NOTE — PROGRESS NOTES
[facility]   Physical Therapy Treatment Note      Name: Sacha Fregoso  Clinic Number: [id number]    Therapy Diagnosis:   No diagnosis found.    Physician: Zuri Knowles MD    Physician Orders: PT Eval and Treat   Medical Diagnosis from Referral: Neck pain [M54.2], Chronic right shoulder pain [M25.511, G89.29], Anterior shin splints [S86.899A]   Evaluation Date: 11/26/2024  Authorization Period Expiration: 12/31/24  Plan of Care Expiration: 2/26/25  Progress Note Due: 2/2/25  Visit # / Visits authorized: 1/11    Time In: 0700  Time Out: 8:20  Total Billable Time: 45 minutes Billing reflects 1:1 direct supervision    MD follow-up:    Precautions: Standard    FOTO:  Goal: 73%  -Intake: 67%  -Status: incomplete  -Discharge: incomplete    Subjective     Pt reports: 1/2- Still having some aches and pains. Keeping mileage down to prevent himself from hurting.  He was compliant with home exercise program.  Response to previous treatment: Improving symptoms  Functional change: Gradually improving function    Pain: Moderate/10  Location: R shoulder, R hip, B shins, lateral elbow    Objective      Objective Measures updated at progress report unless specified otherwise.    Problem list:  R humeral anterior glide  R scapular abdcution, internal rotation,   B ankle pronation  R hip flexion adduction  Cervical extension/rotation    FUNCTIONAL MOVEMENT PATTERNS        Movement  Analysis Notes    Shoulder flexion []Functional  [x]Dysfunctional: []Painful  [x]Non-Painful R shoulder superior glide, scapular elevation         FUNCTIONAL MOVEMENT PATTERNS        Movement Analysis Notes   Bridge hold for 30s []Functional  [x]Dysfunctional:  []Painful  [x]Non-Painful Impaired R glute control   Single leg squat []Functional  [x]Dysfunctional:  []Painful  [x]Non-Painful B mild valgus, B foot pronation         MOTOR CONTROL TESTS:     Right  (spine) Left  Goal   Lower extremity         Prone knee bend  [x] Positive  [] Negative  Comments: [] Positive  [x] Negative  Comments: Negative B             Upper Limb Neurodynamic testing:    Right Left   ULTT 1 (Median) + at 70 deg (ulnar symptoms) + at 70 deg (ulnar symptoms)   ULTT 2B (Radial) + at 30 deg + at 30 deg   ULTT 3 (Ulnar) + +            RANGE OF MOTION:   Cervical Right   (spine) Left     Pain/Dysfunction with Movement Goal   Cervical Flexion (60º) 75% ---       Cervical Extension (80º) 75% ---       Cervical Side Bending (45º) 50% 50%       Cervical Rotation (75º) 50% 75%           Shoulder AROM/PROM Right Left Pain/Dysfunction with Movement Goal   Shoulder Flexion (180º) 150 160 R shrug     Shoulder Abduction (180º)           Shoulder Extension (60º)           Shoulder ER  at 90º (90º) 95 95       Shoulder IR at 90º (70º) 60 60       Functional ER           Functional IR               Hip AROM/PROM Right Left Pain/Dysfunction with Movement Goal   Hip Flexion (120º) 110 120       Hip Extension (30º) 5 5       Hip Abduction (45º) 35 35       Hip IR (45º) 20 20       Hip ER (45º) 35 35                STRENGTH:      L/E MMT Right  (spine) Left Pain/Dysfunction with Movement Goal   Hip Extension  4/5 4+/5   4+/5 B   Hip Abduction  4/5 4+/5 Lateral hip pain 4+/5 B      L/E MMT Right  (spine) Left Pain/Dysfunction with Movement Goal   Shoulder scaption 20 35.2  pain 4+/5 B   Shoulder ER 30.5 30      Shoulder IR 29.6 40.1 pain    Middle trap 12.2 15.5              MUSCLE LENGTH:   Muscle Tested  Right Left  Limitation Goal   Latisimus dorsi [] Normal  [x] Limited [x] Normal  [] Limited   Normal B   Pectoralis Minor [] Normal  [x] Limited [x] Normal  [] Limited   Normal B      Muscle Tested  Right Left  Limitation Goal   Hip Flexors [] Normal  [x] Limited [] Normal  [x] Limited   Normal B   ITB / TFL [] Normal  [x] Limited [] Normal  [x] Limited   Normal B         Joint mobility:  Impaired R posterior GH mobility  Impaired R knee tibial anterior glide  Impaired mid  thoracic mobility  Impaired R C1-3 mobility        SENSATION  [x] Intact to Light Touch                       [] Impaired:        PALPATION: Structures: Increased tenderness to palpation of: bilateral Proximal to mid medial tibia    Treatment     Sacha received the treatments listed below:      CPT Code Intervention Date/Notes  1/2/25   MT Manual Therapy Supine/seatedthoracic gapping Gr5  R C2-3 gapping Gr5   TE Reassessmetn  25 minutes   NR Prone W to Y 3 x 10   NR Landmine press 25# 3 x 12   NR* SL hip thrust with posterior pelvic tilt 30# 3 x 10   TE* Lateral band walk upright Red 3 x 10 yds    NR* Front foot elevated heel raise Incline with 135# safety bar 3 x 10   NR* Side plank full 3 x 45s ea     10 minutes of Manual therapy (MT) to improve pain and ROM. (03695)  10 minutes of Neuromuscular Re-Education (NMR)  to improve: Balance, Coordination, Kinesthetic, Sense, Proprioception, and Posture. (33285)  0 minutes of Therapeutic Activities (TA) to improve functional performance. (96467)  25 minutes of Therapeutic Exercise (TE) to develop strength, endurance, ROM, and flexibility. (16504)  00 minutes of Unattended Electrical Stimulation (ES) for muscle performance and/or pain modulation. (46514)  00 Minutes of Vasopneumatic Device Therapy () for management of swelling/edema. (30295)  BFR: Blood flow restriction applied during exercise  NP: Not Performed    * indicates that exercise was performed, not billed today since patient was not under direct one-on-one supervision     Patient Education and Home Exercises         Education provided:   Physical therapy diagnosis, prognosis, and plan of care.     Written Home Exercises Provided: Patient instructed to cont prior HEP.  Exercises were reviewed and Sacha was able to demonstrate them prior to the end of the session.  Sacha demonstrated good  understanding of the education provided.     See EMR under Patient Instructions for exercises provided prior  visit.    Assessment     1/2- continued upper quarter dysfunction, consistent with upper crossed syndrome which likely connects cervical symptoms to shoulder symptoms. Also with kypholordotic posture which may account for lumbar symptoms. He would benefit from continued work on improving posture/position as well as overall dynamic stability.    Sacha Is progressing well towards his goals.   Pt prognosis is Good.     Pt will continue to benefit from skilled outpatient physical therapy to address the deficits listed in the problem list box on initial evaluation, provide pt/family education and to maximize pt's level of independence in the home and community environment.     Pt's spiritual, cultural and educational needs considered and pt agreeable to plan of care and goals.    Anticipated barriers to physical therapy: co-morbidities and chronicity of condition     Goals:     Short Term Goals:  6 weeks Status  Date Met   PAIN: Pt will report worst pain of 2/10 in order to progress toward max functional ability and improve quality of life. [x] Progressing  [] Met  [] Not Met     FUNCTION: Patient will demonstrate improved function as indicated by a functional score improvement of at least 5 points on FOTO. [x] Progressing  [] Met  [] Not Met     MOBILITY: Patient will improve AROM to 50% of stated goals, listed in objective measures above, in order to progress towards independence with functional activities.  [x] Progressing  [] Met  [] Not Met     STRENGTH: Patient will improve strength to 50% of stated goals, listed in objective measures above, in order to progress towards independence with functional activities. [x] Progressing  [] Met  [] Not Met     POSTURE: Patient will correct postural deviations in sitting and standing, to decrease pain and promote long term stability.  [x] Progressing  [] Met  [] Not Met     HEP: Patient will demonstrate independence with HEP in order to progress toward functional  independence. [x] Progressing  [x] Met  [] Not Met        Long Term Goals:  12 weeks Status Date Met   PAIN: Pt will report worst pain of 1/10 in order to progress toward max functional ability and improve quality of life [x] Progressing  [] Met  [] Not Met     FUNCTION: Patient will demonstrate improved function as indicated by a FOTO functional score improvement as listed in header. [x] Progressing  [] Met  [] Not Met     MOBILITY: Patient will improve AROM to stated goals, listed in objective measures above, in order to return to maximal functional potential and improve quality of life.  [x] Progressing  [] Met  [] Not Met     STRENGTH: Patient will improve strength to stated goals, listed in objective measures above, in order to improve functional independence and quality of life.  [x] Progressing  [] Met  [] Not Met     GAIT: Patient will demonstrate normalized gait mechanics with minimal compensation in order to return to PLOF. [x] Progressing  [] Met  [] Not Met     Patient will return to normal ADL's, IADL's, community involvement, recreational activities, and work-related activities with less than or equal to 1/10 pain and maximal function.  [x] Progressing  [] Met  [] Not Met          Plan       Continue to progress per protocol and per patient tolerance      Jv Pedro, PT, DPT

## 2025-01-02 ENCOUNTER — CLINICAL SUPPORT (OUTPATIENT)
Facility: HOSPITAL | Age: 45
End: 2025-01-02
Attending: PHYSICAL THERAPIST
Payer: OTHER GOVERNMENT

## 2025-01-02 DIAGNOSIS — M22.2X1 PATELLOFEMORAL PAIN SYNDROME OF RIGHT KNEE: Primary | ICD-10-CM

## 2025-01-02 DIAGNOSIS — M17.11 PRIMARY OSTEOARTHRITIS OF RIGHT KNEE: ICD-10-CM

## 2025-01-02 DIAGNOSIS — M25.551 RIGHT HIP PAIN: ICD-10-CM

## 2025-01-02 DIAGNOSIS — S86.899A ANTERIOR SHIN SPLINTS: ICD-10-CM

## 2025-01-02 DIAGNOSIS — G89.29 CHRONIC RIGHT SHOULDER PAIN: ICD-10-CM

## 2025-01-02 DIAGNOSIS — M25.511 CHRONIC RIGHT SHOULDER PAIN: ICD-10-CM

## 2025-01-02 PROCEDURE — 97110 THERAPEUTIC EXERCISES: CPT | Mod: PN | Performed by: PHYSICAL THERAPIST

## 2025-01-02 PROCEDURE — 97112 NEUROMUSCULAR REEDUCATION: CPT | Mod: PN | Performed by: PHYSICAL THERAPIST

## 2025-01-07 ENCOUNTER — CLINICAL SUPPORT (OUTPATIENT)
Facility: HOSPITAL | Age: 45
End: 2025-01-07
Payer: OTHER GOVERNMENT

## 2025-01-07 DIAGNOSIS — M17.11 PRIMARY OSTEOARTHRITIS OF RIGHT KNEE: ICD-10-CM

## 2025-01-07 DIAGNOSIS — S86.899A ANTERIOR SHIN SPLINTS: ICD-10-CM

## 2025-01-07 DIAGNOSIS — M22.2X1 PATELLOFEMORAL PAIN SYNDROME OF RIGHT KNEE: Primary | ICD-10-CM

## 2025-01-07 DIAGNOSIS — M25.551 RIGHT HIP PAIN: ICD-10-CM

## 2025-01-07 DIAGNOSIS — G89.29 CHRONIC RIGHT SHOULDER PAIN: ICD-10-CM

## 2025-01-07 DIAGNOSIS — M25.511 CHRONIC RIGHT SHOULDER PAIN: ICD-10-CM

## 2025-01-07 PROCEDURE — 97140 MANUAL THERAPY 1/> REGIONS: CPT | Mod: PN | Performed by: PHYSICAL THERAPIST

## 2025-01-07 NOTE — PROGRESS NOTES
[facility]   Physical Therapy Treatment Note      Name: Sacha Fregoso  Clinic Number: [id number]    Therapy Diagnosis:   Encounter Diagnoses   Name Primary?    Patellofemoral pain syndrome of right knee Yes    Primary osteoarthritis of right knee     Right hip pain     Anterior shin splints     Chronic right shoulder pain        Physician: Zuri Knowles MD    Physician Orders: PT Eval and Treat   Medical Diagnosis from Referral: Neck pain [M54.2], Chronic right shoulder pain [M25.511, G89.29], Anterior shin splints [S86.899A]   Evaluation Date: 11/26/2024  Authorization Period Expiration: 12/31/24  Plan of Care Expiration: 2/26/25  Progress Note Due: 2/2/25  Visit # / Visits authorized: 2/11    Time In: 0700  Time Out: 0825  Total Billable Time: 25 minutes Billing reflects 1:1 direct supervision    MD follow-up:    Precautions: Standard    FOTO:  Goal: 73%  -Intake: 67%  -Status: incomplete  -Discharge: incomplete    Subjective     Pt reports: 1/7- Everything feels stiff, probably due to the weather.  He was compliant with home exercise program.  Response to previous treatment: Improving symptoms  Functional change: Gradually improving function    Pain: Moderate/10  Location: R shoulder, R hip, B shins, lateral elbow    Objective      Objective Measures updated at progress report unless specified otherwise.    Problem list:  R humeral anterior glide  R scapular abdcution, internal rotation,   B ankle pronation  R hip flexion adduction  Cervical extension/rotation      Treatment     Sacha received the treatments listed below:      CPT Code Intervention Date/Notes  1/7/25   MT Manual Therapy Supine/seatedthoracic gapping Gr5  GH centration  Scapular upward rotation   TE* Thoracic mobs on foam roller 2 min   NR* Serratus wall slide 2 x 15   NR* Prone W to Y 3 x 10   NR* Landmine press 25# 3 x 12   NR* SL hip thrust with posterior pelvic tilt 30# 3 x 10   TE* Lateral band walk  upright Red 3 x 10 yds    NR* Front foot elevated heel raise Incline with 135# safety bar 3 x 10    NR* Dead bug with ball 3 x 8 ea     25 minutes of Manual therapy (MT) to improve pain and ROM. (51879)  00 minutes of Neuromuscular Re-Education (NMR)  to improve: Balance, Coordination, Kinesthetic, Sense, Proprioception, and Posture. (73375)  00 minutes of Therapeutic Activities (TA) to improve functional performance. (06800)  00 minutes of Therapeutic Exercise (TE) to develop strength, endurance, ROM, and flexibility. (55467)  00 minutes of Unattended Electrical Stimulation (ES) for muscle performance and/or pain modulation. (80391)  00 Minutes of Vasopneumatic Device Therapy () for management of swelling/edema. (82465)  BFR: Blood flow restriction applied during exercise  NP: Not Performed    * indicates that exercise was performed, not billed today since patient was not under direct one-on-one supervision     Patient Education and Home Exercises         Education provided:   Physical therapy diagnosis, prognosis, and plan of care.     Written Home Exercises Provided: Patient instructed to cont prior HEP.  Exercises were reviewed and Sacha was able to demonstrate them prior to the end of the session.  Sacha demonstrated good  understanding of the education provided.     See EMR under Patient Instructions for exercises provided prior visit.    Assessment     1/7- Continued upper quarter movement dysfunction and shoulder pain with physiologic motion. Improved with manual therapy.     Sacha Is progressing well towards his goals.   Pt prognosis is Good.     Pt will continue to benefit from skilled outpatient physical therapy to address the deficits listed in the problem list box on initial evaluation, provide pt/family education and to maximize pt's level of independence in the home and community environment.     Pt's spiritual, cultural and educational needs considered and pt agreeable to plan of  care and goals.    Anticipated barriers to physical therapy: co-morbidities and chronicity of condition     Goals:     Short Term Goals:  6 weeks Status  Date Met   PAIN: Pt will report worst pain of 2/10 in order to progress toward max functional ability and improve quality of life. [x] Progressing  [] Met  [] Not Met     FUNCTION: Patient will demonstrate improved function as indicated by a functional score improvement of at least 5 points on FOTO. [x] Progressing  [] Met  [] Not Met     MOBILITY: Patient will improve AROM to 50% of stated goals, listed in objective measures above, in order to progress towards independence with functional activities.  [x] Progressing  [] Met  [] Not Met     STRENGTH: Patient will improve strength to 50% of stated goals, listed in objective measures above, in order to progress towards independence with functional activities. [x] Progressing  [] Met  [] Not Met     POSTURE: Patient will correct postural deviations in sitting and standing, to decrease pain and promote long term stability.  [x] Progressing  [] Met  [] Not Met     HEP: Patient will demonstrate independence with HEP in order to progress toward functional independence. [x] Progressing  [x] Met  [] Not Met        Long Term Goals:  12 weeks Status Date Met   PAIN: Pt will report worst pain of 1/10 in order to progress toward max functional ability and improve quality of life [x] Progressing  [] Met  [] Not Met     FUNCTION: Patient will demonstrate improved function as indicated by a FOTO functional score improvement as listed in header. [x] Progressing  [] Met  [] Not Met     MOBILITY: Patient will improve AROM to stated goals, listed in objective measures above, in order to return to maximal functional potential and improve quality of life.  [x] Progressing  [] Met  [] Not Met     STRENGTH: Patient will improve strength to stated goals, listed in objective measures above, in order to improve functional independence and  quality of life.  [x] Progressing  [] Met  [] Not Met     GAIT: Patient will demonstrate normalized gait mechanics with minimal compensation in order to return to PLOF. [x] Progressing  [] Met  [] Not Met     Patient will return to normal ADL's, IADL's, community involvement, recreational activities, and work-related activities with less than or equal to 1/10 pain and maximal function.  [x] Progressing  [] Met  [] Not Met          Plan       Continue to progress per protocol and per patient tolerance      Jv Pedro, PT, DPT

## 2025-01-08 ENCOUNTER — OFFICE VISIT (OUTPATIENT)
Dept: SPORTS MEDICINE | Facility: CLINIC | Age: 45
End: 2025-01-08
Payer: OTHER GOVERNMENT

## 2025-01-08 VITALS — BODY MASS INDEX: 25.18 KG/M2 | WEIGHT: 179.88 LBS | HEIGHT: 71 IN

## 2025-01-08 DIAGNOSIS — G89.29 CHRONIC RIGHT SHOULDER PAIN: Primary | ICD-10-CM

## 2025-01-08 DIAGNOSIS — S49.91XA INJURY OF RIGHT GLENOID LABRUM: ICD-10-CM

## 2025-01-08 DIAGNOSIS — G56.23 CUBITAL TUNNEL SYNDROME, BILATERAL: ICD-10-CM

## 2025-01-08 DIAGNOSIS — M25.511 CHRONIC RIGHT SHOULDER PAIN: Primary | ICD-10-CM

## 2025-01-08 PROCEDURE — 99214 OFFICE O/P EST MOD 30 MIN: CPT | Mod: S$PBB,,, | Performed by: STUDENT IN AN ORGANIZED HEALTH CARE EDUCATION/TRAINING PROGRAM

## 2025-01-08 PROCEDURE — 99213 OFFICE O/P EST LOW 20 MIN: CPT | Mod: PBBFAC | Performed by: STUDENT IN AN ORGANIZED HEALTH CARE EDUCATION/TRAINING PROGRAM

## 2025-01-08 PROCEDURE — 99999 PR PBB SHADOW E&M-EST. PATIENT-LVL III: CPT | Mod: PBBFAC,,, | Performed by: STUDENT IN AN ORGANIZED HEALTH CARE EDUCATION/TRAINING PROGRAM

## 2025-01-10 ENCOUNTER — OFFICE VISIT (OUTPATIENT)
Dept: ORTHOPEDICS | Facility: CLINIC | Age: 45
End: 2025-01-10
Payer: OTHER GOVERNMENT

## 2025-01-10 ENCOUNTER — CLINICAL SUPPORT (OUTPATIENT)
Facility: HOSPITAL | Age: 45
End: 2025-01-10
Payer: OTHER GOVERNMENT

## 2025-01-10 VITALS — WEIGHT: 179.88 LBS | HEIGHT: 71 IN | BODY MASS INDEX: 25.18 KG/M2

## 2025-01-10 DIAGNOSIS — M17.11 PRIMARY OSTEOARTHRITIS OF RIGHT KNEE: ICD-10-CM

## 2025-01-10 DIAGNOSIS — M25.551 RIGHT HIP PAIN: ICD-10-CM

## 2025-01-10 DIAGNOSIS — G89.29 CHRONIC RIGHT SHOULDER PAIN: ICD-10-CM

## 2025-01-10 DIAGNOSIS — S86.899A ANTERIOR SHIN SPLINTS: ICD-10-CM

## 2025-01-10 DIAGNOSIS — M25.511 CHRONIC RIGHT SHOULDER PAIN: ICD-10-CM

## 2025-01-10 DIAGNOSIS — G56.23 CUBITAL TUNNEL SYNDROME, BILATERAL: ICD-10-CM

## 2025-01-10 DIAGNOSIS — M22.2X1 PATELLOFEMORAL PAIN SYNDROME OF RIGHT KNEE: Primary | ICD-10-CM

## 2025-01-10 PROCEDURE — 99999 PR PBB SHADOW E&M-EST. PATIENT-LVL III: CPT | Mod: PBBFAC,,, | Performed by: STUDENT IN AN ORGANIZED HEALTH CARE EDUCATION/TRAINING PROGRAM

## 2025-01-10 PROCEDURE — 97140 MANUAL THERAPY 1/> REGIONS: CPT | Mod: PN | Performed by: PHYSICAL THERAPIST

## 2025-01-10 PROCEDURE — 99213 OFFICE O/P EST LOW 20 MIN: CPT | Mod: PBBFAC | Performed by: STUDENT IN AN ORGANIZED HEALTH CARE EDUCATION/TRAINING PROGRAM

## 2025-01-10 PROCEDURE — 97112 NEUROMUSCULAR REEDUCATION: CPT | Mod: PN | Performed by: PHYSICAL THERAPIST

## 2025-01-10 NOTE — PROGRESS NOTES
Hand Surgery Clinic Note    Chief Complaint  Chief Complaint   Patient presents with    Left Hand - Numbness    Right Hand - Numbness       History of Present Illness  44 year old right hand dominant male currently in the [place] [employer] presents today for evaluation of bilateral finger numbness and tinging for the last 12 months. Pt rates pain 0/10 today. He has not injured either of his hands. He states that the numbness happens during the day and with flexed elbow overhead motions.  Numbness occurs in the ring and small fingers bilaterally.  He denies any medial elbow pain. Pt is not taking any blood thinning medication. Pt has had electrodiagnostic studies done for the numbness, the results are shown below.     He saw Dr. Chong for this issue on 11/21/2024.  Since that time, he has been working on nerve glide exercises.  This has helped minimally.  More specifically though he has been working on straightening the elbow when he notices numbness in his fingers.  He does not note that the numbness is positional and occurs with elbow flexion.  Additionally, at last visit, he was diagnosed with right lateral epicondylitis.  He has been in therapy for this which is helping.    Review of Systems  Review of systems negative for chest pain, shortness of breath, fevers, chills, nausea/vomiting.    Past Medical History  Past Medical History:   Diagnosis Date    Colon polyp 2008 2013 clear resume at 10 year interval.     GERD (gastroesophageal reflux disease)     Seasonal allergies        Past Surgical History  Past Surgical History:   Procedure Laterality Date    COLONOSCOPY      COLONOSCOPY N/A 12/27/2023    Procedure: COLONOSCOPY;  Surgeon: Jennifer Mcfarland MD;  Location: [facility];  Service: Endoscopy;  Laterality: N/A;    ESOPHAGOGASTRODUODENOSCOPY N/A 7/24/2024    Procedure: EGD (ESOPHAGOGASTRODUODENOSCOPY);  Surgeon: Magi Roth MD;  Location: [facility];  Service: Gastroenterology;  Laterality: N/A;     VASECTOMY      wisdoom teeth         Allergies  Review of patient's allergies indicates:  No Known Allergies    Family History  Family History   Problem Relation Name Age of Onset    Diabetes Mother Mom     Hyperlipidemia Father Dad     No Known Problems Sister      No Known Problems Brother      No Known Problems Maternal Aunt      No Known Problems Maternal Uncle      No Known Problems Paternal Aunt      No Known Problems Paternal Uncle      No Known Problems Maternal Grandmother      No Known Problems Maternal Grandfather      COPD Paternal Grandmother Granny     No Known Problems Paternal Grandfather      Cancer Paternal Aunt Viktoriya Girish        Social History  Social History     Socioeconomic History    Marital status:      Spouse name: Thao    Number of children: 3   Occupational History     Comment: [employer] time [employer]   Tobacco Use    Smoking status: Former     Types: Cigarettes     Passive exposure: Never    Smokeless tobacco: Former     Types: Chew   Substance and Sexual Activity    Alcohol use: Yes     Alcohol/week: 1.0 - 2.0 standard drink of alcohol     Types: 1 - 2 Cans of beer per week     Comment: 2x monthly    Drug use: No    Sexual activity: Yes     Partners: Female     Birth control/protection: Partner-Vasectomy   Social History Narrative    Mom is Saranya Fregoso     Social Drivers of Health     Financial Resource Strain: Low Risk  (11/30/2023)    Overall Financial Resource Strain (CARDIA)     Difficulty of Paying Living Expenses: Not hard at all   Food Insecurity: No Food Insecurity (11/30/2023)    Hunger Vital Sign     Worried About Running Out of Food in the Last Year: Never true     Ran Out of Food in the Last Year: Never true   Transportation Needs: No Transportation Needs (11/30/2023)    PRAPARE - Transportation     Lack of Transportation (Medical): No     Lack of Transportation (Non-Medical): No   Physical Activity: Sufficiently Active (11/30/2023)    Exercise Vital Sign      Days of Exercise per Week: 5 days     Minutes of Exercise per Session: 60 min   Stress: No Stress Concern Present (11/30/2023)    [employer] - Occupational Stress Questionnaire     Feeling of Stress : Only a little   Housing Stability: Low Risk  (11/30/2023)    Housing Stability Vital Sign     Unable to Pay for Housing in the Last Year: No     Number of Places Lived in the Last Year: 1     Unstable Housing in the Last Year: No       Vital Signs  There were no vitals filed for this visit.    Physical Exam  Constitutional: Appears well-developed and well-nourished. No distress.   HENT:   Head: Normocephalic.   Eyes: EOM are normal.   Pulmonary/Chest: Effort normal.   Neurological: Oriented to person, place, and time.   Psychiatric: Normal mood and affect.     Left Upper Extremity:  No abrasions, lacerations, wounds.  No swelling.  No erythema.  Full active elbow flexion and extension. Full active wrist flexion and extension.  Full pronation and supination. Negative Tinel's in the median and ulnar nerve distributions at the wrist.  Negative Phalen's.  Negative Tinel's in the ulnar nerve distribution at the elbow.  + Ulnar nerve subluxation with elbow flexion.  Positive elbow flexion test. No thenar or FDI atrophy.  5/5 apb strength.  5/5 FDI strength.  Two-point discrimination is 5 mm in thumb, index, middle, and ring fingers, and 6 in the small finger.  Patient is able to make a fist and extend all her fingers.  No tenderness over the A1 pulleys of all 5 fingers.  No triggering with range of motion.  Negative froments. Negative Wartenbergs. Palpable radial pulse.     Right upper extremity  No abrasions, lacerations, wounds.  No swelling.  No erythema.  Full active elbow flexion and extension. Full active wrist flexion and extension.  Full pronation and supination. Negative Tinel's in the median and ulnar nerve distributions at the wrist.  Negative Phalen's.  Negative Tinel's in the ulnar  nerve distribution at the elbow.  + Ulnar nerve subluxation with elbow flexion.  Positive elbow flexion test. No thenar or FDI atrophy.  5/5 apb strength.  5/5 FDI strength.  Two-point discrimination is 5 mm in thumb, index, middle, and ring fingers, and 6 in the small finger..  Patient is able to make a fist and extend all her fingers.  No tenderness over the A1 pulleys of all 5 fingers.  No triggering with range of motion.  Negative froments. Negative Wartenbergs. Palpable radial pulse.     Imaging  Electrodiagnostic studies were reviewed by me today that were performed on 07/10/2024. The Left median DSL was 3.12 ms and the right was 3.23 ms. The Left median DML was 7.73 ms and the right was 7.75 ms. Left Ulnar velocity across elbow was 56.3 m/s and Right was 60.7 m/s.  EMG was not assessed.  Performing physician interpretation: This is a normal study.      There is no electrophysiologic evidence of median mononeuropathy across the wrist (carpal tunnel syndrome) on either side. In addition, there is no electrophysiologic evidence of brachial plexopathy or other entrapment mononeuropathy in either upper extremity.    Assessment and Plan  44 year old right hand dominant male presents today for evaluation of bilateral cubital tunnel syndrome for the last year. The patient was educated on surgical options for cubital tunnel repair and ulnar nerve transposition.The patient is currently active in [place] . He is working towards assisted soon [facility] so he is not ready to undergo surgery at this time. He was given an informational handout on cubital tunnel at his visit today. Pt was instructed to follow up in clinic if numbness no longer resolves with elbow extension, or if he wants to pursue scheduling surgical release.     KELVIN Muhammad MD  Orthopaedic Hand Surgery

## 2025-01-10 NOTE — PROGRESS NOTES
[facility]   Physical Therapy Treatment Note      Name: Sacha Fregoso  Clinic Number: [id number]    Therapy Diagnosis:   Encounter Diagnoses   Name Primary?    Patellofemoral pain syndrome of right knee Yes    Primary osteoarthritis of right knee     Right hip pain     Anterior shin splints     Chronic right shoulder pain        Physician: Zuri Knowles MD    Physician Orders: PT Eval and Treat   Medical Diagnosis from Referral: Neck pain [M54.2], Chronic right shoulder pain [M25.511, G89.29], Anterior shin splints [S86.899A]   Evaluation Date: 11/26/2024  Authorization Period Expiration: 12/31/24  Plan of Care Expiration: 2/26/25  Progress Note Due: 2/2/25  Visit # / Visits authorized: 3/11    Time In: 0700  Time Out: 0815  Total Billable Time: 50 minutes Billing reflects 1:1 direct supervision    MD follow-up:    Precautions: Standard    FOTO:  Goal: 73%  -Intake: 67%  -Status: incomplete  -Discharge: incomplete    Subjective     Pt reports: 1/10- He saw Dr. Danielle who ordered an MRI. Shoulder is irritated this morning since he woke up.  He was compliant with home exercise program.  Response to previous treatment: Improving symptoms  Functional change: Gradually improving function    Pain: Moderate/10  Location: R shoulder, R hip, B shins, lateral elbow    Objective      Objective Measures updated at progress report unless specified otherwise.    Problem list:  R humeral anterior glide  R scapular abdcution, internal rotation,   B ankle pronation  R hip flexion adduction  Cervical extension/rotation      Treatment     Sacha received the treatments listed below:      CPT Code Intervention Date/Notes  1/10/25   MT Manual Therapy Supine/seatedthoracic gapping Gr5  L C1-2, C5-6 gappin gGr5  L C3-4 opening Gr5  GH centration  Scapular upward rotation   NR Serratus wall slide 2 x 15   NR Shoulder ER TB 3 x 10   NR Prone W to Y 3 x 10   NR* Landmine press 25# 3 x 12     25  minutes of Manual therapy (MT) to improve pain and ROM. (71975)  25 minutes of Neuromuscular Re-Education (NMR)  to improve: Balance, Coordination, Kinesthetic, Sense, Proprioception, and Posture. (60068)  00 minutes of Therapeutic Activities (TA) to improve functional performance. (13261)  00 minutes of Therapeutic Exercise (TE) to develop strength, endurance, ROM, and flexibility. (44686)  00 minutes of Unattended Electrical Stimulation (ES) for muscle performance and/or pain modulation. (17696)  00 Minutes of Vasopneumatic Device Therapy () for management of swelling/edema. (28576)  BFR: Blood flow restriction applied during exercise  NP: Not Performed    * indicates that exercise was performed, not billed today since patient was not under direct one-on-one supervision     Patient Education and Home Exercises         Education provided:   Physical therapy diagnosis, prognosis, and plan of care.     Written Home Exercises Provided: Patient instructed to cont prior HEP.  Exercises were reviewed and Sacha was able to demonstrate them prior to the end of the session.  Sacha demonstrated good  understanding of the education provided.     See EMR under Patient Instructions for exercises provided prior visit.    Assessment     1/10- Patient continues to have pain with end range shoulder flexion, noted to have impaired scapular upward rotation and posterior tilt as well as cervical and thoracic dysfunction that may contribute. Improved symptoms with manual therapy addressing these regions.    Sacha Is progressing well towards his goals.   Pt prognosis is Good.     Pt will continue to benefit from skilled outpatient physical therapy to address the deficits listed in the problem list box on initial evaluation, provide pt/family education and to maximize pt's level of independence in the home and community environment.     Pt's spiritual, cultural and educational needs considered and pt agreeable to plan of  care and goals.    Anticipated barriers to physical therapy: co-morbidities and chronicity of condition     Goals:     Short Term Goals:  6 weeks Status  Date Met   PAIN: Pt will report worst pain of 2/10 in order to progress toward max functional ability and improve quality of life. [x] Progressing  [] Met  [] Not Met     FUNCTION: Patient will demonstrate improved function as indicated by a functional score improvement of at least 5 points on FOTO. [x] Progressing  [] Met  [] Not Met     MOBILITY: Patient will improve AROM to 50% of stated goals, listed in objective measures above, in order to progress towards independence with functional activities.  [x] Progressing  [] Met  [] Not Met     STRENGTH: Patient will improve strength to 50% of stated goals, listed in objective measures above, in order to progress towards independence with functional activities. [x] Progressing  [] Met  [] Not Met     POSTURE: Patient will correct postural deviations in sitting and standing, to decrease pain and promote long term stability.  [x] Progressing  [] Met  [] Not Met     HEP: Patient will demonstrate independence with HEP in order to progress toward functional independence. [x] Progressing  [x] Met  [] Not Met        Long Term Goals:  12 weeks Status Date Met   PAIN: Pt will report worst pain of 1/10 in order to progress toward max functional ability and improve quality of life [x] Progressing  [] Met  [] Not Met     FUNCTION: Patient will demonstrate improved function as indicated by a FOTO functional score improvement as listed in header. [x] Progressing  [] Met  [] Not Met     MOBILITY: Patient will improve AROM to stated goals, listed in objective measures above, in order to return to maximal functional potential and improve quality of life.  [x] Progressing  [] Met  [] Not Met     STRENGTH: Patient will improve strength to stated goals, listed in objective measures above, in order to improve functional independence and  quality of life.  [x] Progressing  [] Met  [] Not Met     GAIT: Patient will demonstrate normalized gait mechanics with minimal compensation in order to return to PLOF. [x] Progressing  [] Met  [] Not Met     Patient will return to normal ADL's, IADL's, community involvement, recreational activities, and work-related activities with less than or equal to 1/10 pain and maximal function.  [x] Progressing  [] Met  [] Not Met          Plan       Continue to progress per protocol and per patient tolerance      Jv Pedro, PT, DPT

## 2025-01-14 ENCOUNTER — CLINICAL SUPPORT (OUTPATIENT)
Facility: HOSPITAL | Age: 45
End: 2025-01-14
Payer: OTHER GOVERNMENT

## 2025-01-14 DIAGNOSIS — M17.11 PRIMARY OSTEOARTHRITIS OF RIGHT KNEE: ICD-10-CM

## 2025-01-14 DIAGNOSIS — M22.2X1 PATELLOFEMORAL PAIN SYNDROME OF RIGHT KNEE: Primary | ICD-10-CM

## 2025-01-14 DIAGNOSIS — M25.551 RIGHT HIP PAIN: ICD-10-CM

## 2025-01-14 DIAGNOSIS — G89.29 CHRONIC RIGHT SHOULDER PAIN: ICD-10-CM

## 2025-01-14 DIAGNOSIS — M25.511 CHRONIC RIGHT SHOULDER PAIN: ICD-10-CM

## 2025-01-14 DIAGNOSIS — S86.899A ANTERIOR SHIN SPLINTS: ICD-10-CM

## 2025-01-14 PROCEDURE — 97112 NEUROMUSCULAR REEDUCATION: CPT | Mod: PN | Performed by: PHYSICAL THERAPIST

## 2025-01-14 PROCEDURE — 97140 MANUAL THERAPY 1/> REGIONS: CPT | Mod: PN | Performed by: PHYSICAL THERAPIST

## 2025-01-14 NOTE — PROGRESS NOTES
[facility]   Physical Therapy Treatment Note      Name: Sacha Fregoso  Clinic Number: [id number]    Therapy Diagnosis:   Encounter Diagnoses   Name Primary?    Patellofemoral pain syndrome of right knee Yes    Primary osteoarthritis of right knee     Right hip pain     Anterior shin splints     Chronic right shoulder pain          Physician: Zuri Knowles MD    Physician Orders: PT Eval and Treat   Medical Diagnosis from Referral: Neck pain [M54.2], Chronic right shoulder pain [M25.511, G89.29], Anterior shin splints [S86.899A]   Evaluation Date: 11/26/2024  Authorization Period Expiration: 12/31/24  Plan of Care Expiration: 2/26/25  Progress Note Due: 2/2/25  Visit # / Visits authorized: 4/11    Time In: 0900  Time Out: 1010  Total Billable Time: 35 minutes Billing reflects 1:1 direct supervision    MD follow-up:    Precautions: Standard    FOTO:  Goal: 73%  -Intake: 67%  -Status: incomplete  -Discharge: incomplete    Subjective     Pt reports: 1/14- L shoulder still with pain at very end of range reaching overhead. Back is feeling stiff this morning.  He was compliant with home exercise program.  Response to previous treatment: Improving symptoms  Functional change: Gradually improving function    Pain: Moderate/10  Location: R shoulder, R hip, B shins, lateral elbow    Objective      Objective Measures updated at progress report unless specified otherwise.    Problem list:  R humeral anterior glide  R scapular abdcution, internal rotation,   B ankle pronation  R hip flexion adduction  Cervical extension/rotation      Treatment     Sacha received the treatments listed below:      CPT Code Intervention Date/Notes  1/14/25   MT Manual Therapy Supine/seatedthoracic gapping Gr5  GH centration  Scapular upward rotation   TE* Thoracic mobs on foam roller 2 min   NR Serratus wall slide 2 x 15   NR Shoulder ER TB 3 x 10   NR Prone W to Y 3 x 10   NR* Landmine press 25# 3 x 12    NR* Quadruped Serratus Rocking 20x   TE* Push-Up Plus 2 x 10     15 minutes of Manual therapy (MT) to improve pain and ROM. (34436)  20 minutes of Neuromuscular Re-Education (NMR)  to improve: Balance, Coordination, Kinesthetic, Sense, Proprioception, and Posture. (69316)  00 minutes of Therapeutic Activities (TA) to improve functional performance. (96197)  00 minutes of Therapeutic Exercise (TE) to develop strength, endurance, ROM, and flexibility. (96625)  00 minutes of Unattended Electrical Stimulation (ES) for muscle performance and/or pain modulation. (59875)  00 Minutes of Vasopneumatic Device Therapy () for management of swelling/edema. (23028)  BFR: Blood flow restriction applied during exercise  NP: Not Performed    * indicates that exercise was performed, not billed today since patient was not under direct one-on-one supervision     Patient Education and Home Exercises         Education provided:   Physical therapy diagnosis, prognosis, and plan of care.     Written Home Exercises Provided: Patient instructed to cont prior HEP.  Exercises were reviewed and Sacha was able to demonstrate them prior to the end of the session.  Sacha demonstrated good  understanding of the education provided.     See EMR under Patient Instructions for exercises provided prior visit.    Assessment     1/14 - Patient with improved motor control of serratus anterior following neuro re-ed. Able to reverse scapular winging with verbal and tactile cues, but winging returns with significant fatigue. Upper trap dominance slightly decreased with overhead activities this visit.    Sacha Is progressing well towards his goals.   Pt prognosis is Good.     Pt will continue to benefit from skilled outpatient physical therapy to address the deficits listed in the problem list box on initial evaluation, provide pt/family education and to maximize pt's level of independence in the home and community environment.     Pt's  spiritual, cultural and educational needs considered and pt agreeable to plan of care and goals.    Anticipated barriers to physical therapy: co-morbidities and chronicity of condition     Goals:     Short Term Goals:  6 weeks Status  Date Met   PAIN: Pt will report worst pain of 2/10 in order to progress toward max functional ability and improve quality of life. [x] Progressing  [] Met  [] Not Met     FUNCTION: Patient will demonstrate improved function as indicated by a functional score improvement of at least 5 points on FOTO. [x] Progressing  [] Met  [] Not Met     MOBILITY: Patient will improve AROM to 50% of stated goals, listed in objective measures above, in order to progress towards independence with functional activities.  [x] Progressing  [] Met  [] Not Met     STRENGTH: Patient will improve strength to 50% of stated goals, listed in objective measures above, in order to progress towards independence with functional activities. [x] Progressing  [] Met  [] Not Met     POSTURE: Patient will correct postural deviations in sitting and standing, to decrease pain and promote long term stability.  [x] Progressing  [] Met  [] Not Met     HEP: Patient will demonstrate independence with HEP in order to progress toward functional independence. [x] Progressing  [x] Met  [] Not Met        Long Term Goals:  12 weeks Status Date Met   PAIN: Pt will report worst pain of 1/10 in order to progress toward max functional ability and improve quality of life [x] Progressing  [] Met  [] Not Met     FUNCTION: Patient will demonstrate improved function as indicated by a FOTO functional score improvement as listed in header. [x] Progressing  [] Met  [] Not Met     MOBILITY: Patient will improve AROM to stated goals, listed in objective measures above, in order to return to maximal functional potential and improve quality of life.  [x] Progressing  [] Met  [] Not Met     STRENGTH: Patient will improve strength to stated goals,  listed in objective measures above, in order to improve functional independence and quality of life.  [x] Progressing  [] Met  [] Not Met     GAIT: Patient will demonstrate normalized gait mechanics with minimal compensation in order to return to PLOF. [x] Progressing  [] Met  [] Not Met     Patient will return to normal ADL's, IADL's, community involvement, recreational activities, and work-related activities with less than or equal to 1/10 pain and maximal function.  [x] Progressing  [] Met  [] Not Met          Plan       Continue to progress per protocol and per patient tolerance      Jv Pedro, PT, DPT

## 2025-01-16 ENCOUNTER — CLINICAL SUPPORT (OUTPATIENT)
Facility: HOSPITAL | Age: 45
End: 2025-01-16
Payer: OTHER GOVERNMENT

## 2025-01-16 DIAGNOSIS — M17.11 PRIMARY OSTEOARTHRITIS OF RIGHT KNEE: ICD-10-CM

## 2025-01-16 DIAGNOSIS — M22.2X1 PATELLOFEMORAL PAIN SYNDROME OF RIGHT KNEE: Primary | ICD-10-CM

## 2025-01-16 DIAGNOSIS — M25.551 RIGHT HIP PAIN: ICD-10-CM

## 2025-01-16 DIAGNOSIS — S86.899A ANTERIOR SHIN SPLINTS: ICD-10-CM

## 2025-01-16 DIAGNOSIS — G89.29 CHRONIC RIGHT SHOULDER PAIN: ICD-10-CM

## 2025-01-16 DIAGNOSIS — M25.511 CHRONIC RIGHT SHOULDER PAIN: ICD-10-CM

## 2025-01-16 PROCEDURE — 97112 NEUROMUSCULAR REEDUCATION: CPT | Mod: PN | Performed by: PHYSICAL THERAPIST

## 2025-01-16 PROCEDURE — 97140 MANUAL THERAPY 1/> REGIONS: CPT | Mod: PN | Performed by: PHYSICAL THERAPIST

## 2025-01-16 NOTE — PROGRESS NOTES
[facility]   Physical Therapy Treatment Note      Name: Sacha Fregoso  Clinic Number: [id number]    Therapy Diagnosis:   Encounter Diagnoses   Name Primary?    Patellofemoral pain syndrome of right knee Yes    Primary osteoarthritis of right knee     Right hip pain     Anterior shin splints     Chronic right shoulder pain          Physician: Zuri Knowles MD    Physician Orders: PT Eval and Treat   Medical Diagnosis from Referral: Neck pain [M54.2], Chronic right shoulder pain [M25.511, G89.29], Anterior shin splints [S86.899A]   Evaluation Date: 11/26/2024  Authorization Period Expiration: 12/31/24  Plan of Care Expiration: 2/26/25  Progress Note Due: 2/2/25  Visit # / Visits authorized: 5/11    Time In: 0800  Time Out: 0915  Total Billable Time: 55 minutes Billing reflects 1:1 direct supervision    MD follow-up:    Precautions: Standard    FOTO:  Goal: 73%  -Intake: 67%  -Status: incomplete  -Discharge: incomplete    Subjective     Pt reports: 1/16- He was feeling stiff this morning but did a warm up and is feeling better.  He was compliant with home exercise program.  Response to previous treatment: Improving symptoms  Functional change: Gradually improving function    Pain: Moderate/10  Location: R shoulder, R hip, B shins, lateral elbow    Objective      Objective Measures updated at progress report unless specified otherwise.    Problem list:  R humeral anterior glide  R scapular abdcution, internal rotation,   B ankle pronation  R hip flexion adduction  Cervical extension/rotation      Treatment     Sacha received the treatments listed below:      CPT Code Intervention Date/Notes  1/16/25   MT Manual Therapy Supine/seatedthoracic gapping Gr5  GH centration  Scapular upward rotation   NR Prone W to Y 3 x 10    NR Wall serratus reach 3 x 8   NR Shoulder ER 5# 3 x 10   NR Serratus wall slide 2 x 15   NR Prone T thumb up 3 x 10    NR Single arm serratus press- hand on  table 3 x 10   TA* Front foot elevated heel raise 135# safety bar 3 x 10   TA* Leg press 3 x 10   TE* Knee extension 3 x 10     15 minutes of Manual therapy (MT) to improve pain and ROM. (61019)  40 minutes of Neuromuscular Re-Education (NMR)  to improve: Balance, Coordination, Kinesthetic, Sense, Proprioception, and Posture. (72295)  00 minutes of Therapeutic Activities (TA) to improve functional performance. (98705)  00 minutes of Therapeutic Exercise (TE) to develop strength, endurance, ROM, and flexibility. (77144)  00 minutes of Unattended Electrical Stimulation (ES) for muscle performance and/or pain modulation. (35542)  00 Minutes of Vasopneumatic Device Therapy () for management of swelling/edema. (66494)  BFR: Blood flow restriction applied during exercise  NP: Not Performed    * indicates that exercise was performed, not billed today since patient was not under direct one-on-one supervision     Patient Education and Home Exercises         Education provided:   Physical therapy diagnosis, prognosis, and plan of care.     Written Home Exercises Provided: Patient instructed to cont prior HEP.  Exercises were reviewed and Sacha was able to demonstrate them prior to the end of the session.  Sacha demonstrated good  understanding of the education provided.     See EMR under Patient Instructions for exercises provided prior visit.    Assessment     1/16- Patient continues to demonstrate impaired scapular upward rotation, noted to have persistent serratus anterior weakness that likely contributes to shoulder pain. We had to modify exercise to accommodate tendency toward scapular winging.    Sacha Is progressing well towards his goals.   Pt prognosis is Good.     Pt will continue to benefit from skilled outpatient physical therapy to address the deficits listed in the problem list box on initial evaluation, provide pt/family education and to maximize pt's level of independence in the home and  community environment.     Pt's spiritual, cultural and educational needs considered and pt agreeable to plan of care and goals.    Anticipated barriers to physical therapy: co-morbidities and chronicity of condition     Goals:     Short Term Goals:  6 weeks Status  Date Met   PAIN: Pt will report worst pain of 2/10 in order to progress toward max functional ability and improve quality of life. [x] Progressing  [] Met  [] Not Met     FUNCTION: Patient will demonstrate improved function as indicated by a functional score improvement of at least 5 points on FOTO. [x] Progressing  [] Met  [] Not Met     MOBILITY: Patient will improve AROM to 50% of stated goals, listed in objective measures above, in order to progress towards independence with functional activities.  [x] Progressing  [] Met  [] Not Met     STRENGTH: Patient will improve strength to 50% of stated goals, listed in objective measures above, in order to progress towards independence with functional activities. [x] Progressing  [] Met  [] Not Met     POSTURE: Patient will correct postural deviations in sitting and standing, to decrease pain and promote long term stability.  [x] Progressing  [] Met  [] Not Met     HEP: Patient will demonstrate independence with HEP in order to progress toward functional independence. [x] Progressing  [x] Met  [] Not Met        Long Term Goals:  12 weeks Status Date Met   PAIN: Pt will report worst pain of 1/10 in order to progress toward max functional ability and improve quality of life [x] Progressing  [] Met  [] Not Met     FUNCTION: Patient will demonstrate improved function as indicated by a FOTO functional score improvement as listed in header. [x] Progressing  [] Met  [] Not Met     MOBILITY: Patient will improve AROM to stated goals, listed in objective measures above, in order to return to maximal functional potential and improve quality of life.  [x] Progressing  [] Met  [] Not Met     STRENGTH: Patient will  improve strength to stated goals, listed in objective measures above, in order to improve functional independence and quality of life.  [x] Progressing  [] Met  [] Not Met     GAIT: Patient will demonstrate normalized gait mechanics with minimal compensation in order to return to PLOF. [x] Progressing  [] Met  [] Not Met     Patient will return to normal ADL's, IADL's, community involvement, recreational activities, and work-related activities with less than or equal to 1/10 pain and maximal function.  [x] Progressing  [] Met  [] Not Met          Plan       Continue to progress per protocol and per patient tolerance      Jv Pedro, PT, DPT

## 2025-01-27 ENCOUNTER — CLINICAL SUPPORT (OUTPATIENT)
Facility: HOSPITAL | Age: 45
End: 2025-01-27
Payer: OTHER GOVERNMENT

## 2025-01-27 DIAGNOSIS — M17.11 PRIMARY OSTEOARTHRITIS OF RIGHT KNEE: ICD-10-CM

## 2025-01-27 DIAGNOSIS — M22.2X1 PATELLOFEMORAL PAIN SYNDROME OF RIGHT KNEE: Primary | ICD-10-CM

## 2025-01-27 DIAGNOSIS — S86.899A ANTERIOR SHIN SPLINTS: ICD-10-CM

## 2025-01-27 DIAGNOSIS — M25.511 CHRONIC RIGHT SHOULDER PAIN: ICD-10-CM

## 2025-01-27 DIAGNOSIS — M25.551 RIGHT HIP PAIN: ICD-10-CM

## 2025-01-27 DIAGNOSIS — G89.29 CHRONIC RIGHT SHOULDER PAIN: ICD-10-CM

## 2025-01-27 PROCEDURE — 97140 MANUAL THERAPY 1/> REGIONS: CPT | Mod: PN | Performed by: PHYSICAL THERAPIST

## 2025-01-27 PROCEDURE — 97112 NEUROMUSCULAR REEDUCATION: CPT | Mod: PN | Performed by: PHYSICAL THERAPIST

## 2025-01-27 PROCEDURE — 97110 THERAPEUTIC EXERCISES: CPT | Mod: PN | Performed by: PHYSICAL THERAPIST

## 2025-01-27 NOTE — PROGRESS NOTES
[facility]   Physical Therapy Treatment Note      Name: Sacha Fregoso  Clinic Number: [id number]    Therapy Diagnosis:   No diagnosis found.        Physician: Zuri Knowles MD    Physician Orders: PT Eval and Treat   Medical Diagnosis from Referral: Neck pain [M54.2], Chronic right shoulder pain [M25.511, G89.29], Anterior shin splints [S86.899A]   Evaluation Date: 11/26/2024  Authorization Period Expiration: 12/31/24  Plan of Care Expiration: 2/26/25  Progress Note Due: 2/2/25  Visit # / Visits authorized: 6/11    Time In: 0803  Time Out: ***  Total Billable Time: *** minutes Billing reflects 1:1 direct supervision    MD follow-up:    Precautions: Standard    FOTO:  Goal: 73%  -Intake: 67%  -Status: incomplete  -Discharge: incomplete    Subjective     Pt reports: 1/27- Shoulder isn't feeling as bad. Still having stiffness in back.  He was compliant with home exercise program.  Response to previous treatment: Improving symptoms  Functional change: Gradually improving function    Pain: Moderate/10  Location: R shoulder, R hip, B shins, lateral elbow    Objective      Objective Measures updated at progress report unless specified otherwise.    Problem list:  R humeral anterior glide  R scapular abdcution, internal rotation,   B ankle pronation  R hip flexion adduction  Cervical extension/rotation      Treatment     Sacha received the treatments listed below:      CPT Code Intervention Date/Notes  1/16/25   MT Manual Therapy Supine/seatedthoracic gapping Gr5  GH centration  Scapular upward rotation   NR Prone W to Y 3 x 10    NR Wall serratus reach 3 x 8   NR Shoulder ER 5# 3 x 10   NR Serratus wall slide 2 x 15   NR Prone T thumb up 3 x 10    NR Single arm serratus press- hand on table 3 x 10   TA* Front foot elevated heel raise 135# safety bar 3 x 10   TA* Leg press 3 x 10   TE* Knee extension 3 x 10     15 minutes of Manual therapy (MT) to improve pain and ROM. (75203) 51  minutes of Neuromuscular Re-Education (NMR)  to improve: Balance, Coordination, Kinesthetic, Sense, Proprioception, and Posture. (53466)  00 minutes of Therapeutic Activities (TA) to improve functional performance. (01479)  00 minutes of Therapeutic Exercise (TE) to develop strength, endurance, ROM, and flexibility. (69626)  00 minutes of Unattended Electrical Stimulation (ES) for muscle performance and/or pain modulation. (22792)  00 Minutes of Vasopneumatic Device Therapy () for management of swelling/edema. (23880)  BFR: Blood flow restriction applied during exercise  NP: Not Performed    * indicates that exercise was performed, not billed today since patient was not under direct one-on-one supervision     Patient Education and Home Exercises         Education provided:   Physical therapy diagnosis, prognosis, and plan of care.     Written Home Exercises Provided: Patient instructed to cont prior HEP.  Exercises were reviewed and Sacha was able to demonstrate them prior to the end of the session.  Sacha demonstrated good  understanding of the education provided.     See EMR under Patient Instructions for exercises provided prior visit.    Assessment     1/27- ***    Sacha Is progressing well towards his goals.   Pt prognosis is Good.     Pt will continue to benefit from skilled outpatient physical therapy to address the deficits listed in the problem list box on initial evaluation, provide pt/family education and to maximize pt's level of independence in the home and community environment.     Pt's spiritual, cultural and educational needs considered and pt agreeable to plan of care and goals.    Anticipated barriers to physical therapy: co-morbidities and chronicity of condition     Goals:     Short Term Goals:  6 weeks Status  Date Met   PAIN: Pt will report worst pain of 2/10 in order to progress toward max functional ability and improve quality of life. [x] Progressing  [] Met  [] Not Met      FUNCTION: Patient will demonstrate improved function as indicated by a functional score improvement of at least 5 points on FOTO. [x] Progressing  [] Met  [] Not Met     MOBILITY: Patient will improve AROM to 50% of stated goals, listed in objective measures above, in order to progress towards independence with functional activities.  [x] Progressing  [] Met  [] Not Met     STRENGTH: Patient will improve strength to 50% of stated goals, listed in objective measures above, in order to progress towards independence with functional activities. [x] Progressing  [] Met  [] Not Met     POSTURE: Patient will correct postural deviations in sitting and standing, to decrease pain and promote long term stability.  [x] Progressing  [] Met  [] Not Met     HEP: Patient will demonstrate independence with HEP in order to progress toward functional independence. [x] Progressing  [x] Met  [] Not Met        Long Term Goals:  12 weeks Status Date Met   PAIN: Pt will report worst pain of 1/10 in order to progress toward max functional ability and improve quality of life [x] Progressing  [] Met  [] Not Met     FUNCTION: Patient will demonstrate improved function as indicated by a FOTO functional score improvement as listed in header. [x] Progressing  [] Met  [] Not Met     MOBILITY: Patient will improve AROM to stated goals, listed in objective measures above, in order to return to maximal functional potential and improve quality of life.  [x] Progressing  [] Met  [] Not Met     STRENGTH: Patient will improve strength to stated goals, listed in objective measures above, in order to improve functional independence and quality of life.  [x] Progressing  [] Met  [] Not Met     GAIT: Patient will demonstrate normalized gait mechanics with minimal compensation in order to return to PLOF. [x] Progressing  [] Met  [] Not Met     Patient will return to normal ADL's, IADL's, community involvement, recreational activities, and work-related  activities with less than or equal to 1/10 pain and maximal function.  [x] Progressing  [] Met  [] Not Met          Plan       Continue to progress per protocol and per patient tolerance      Jv Pedro, PT, DPT

## 2025-01-27 NOTE — PROGRESS NOTES
[facility]   Physical Therapy Treatment Note      Name: Sacha Fregoso  Clinic Number: [id number]    Therapy Diagnosis:   Encounter Diagnoses   Name Primary?    Patellofemoral pain syndrome of right knee Yes    Primary osteoarthritis of right knee     Right hip pain     Anterior shin splints     Chronic right shoulder pain        Physician: Zuri Knowles MD    Physician Orders: PT Eval and Treat   Medical Diagnosis from Referral: Neck pain [M54.2], Chronic right shoulder pain [M25.511, G89.29], Anterior shin splints [S86.899A]   Evaluation Date: 11/26/2024  Authorization Period Expiration: 12/31/24  Plan of Care Expiration: 2/26/25  Progress Note Due: 2/2/25  Visit # / Visits authorized: 6/11    Time In: 0803  Time Out: 920  Total Billable Time: 39 minutes Billing reflects 1:1 direct supervision    MD follow-up:    Precautions: Standard    FOTO:  Goal: 73%  -Intake: 67%  -Status: incomplete  -Discharge: incomplete    Subjective     Pt reports: 1/27- Shoulder isn't feeling as bad. Still having stiffness in back.  He was compliant with home exercise program.  Response to previous treatment: Improving symptoms  Functional change: Gradually improving function    Pain: Moderate/10  Location: R shoulder, R hip, B shins, lateral elbow    Objective      Objective Measures updated at progress report unless specified otherwise.    Problem list:  R humeral anterior glide  R scapular abdcution, internal rotation,   B ankle pronation  R hip flexion adduction  Cervical extension/rotation      Treatment     Sacha received the treatments listed below:      CPT Code Intervention Date/Notes  1/27/25   MT Manual Therapy Supine/seatedthoracic gapping Gr5  GH centration  Scapular upward rotation   TE Kneeling Lat stretch 30s x 3   TE Standing Pec stretch 30s x 3   NR* Prone W to Y 3 x 10   TE* Wall serratus reach 3 x 8   NR Shoulder ER 5# 3 x 10   NR Serratus wall slide 2 x 15   NR* Prone T thumb  up 3 x 10 5#   NR* Single arm serratus press- hand on table 3 x 10   TE  carries 10# 3 laps     15 minutes of Manual therapy (MT) to improve pain and ROM. (41082)  10 minutes of Neuromuscular Re-Education (NMR)  to improve: Balance, Coordination, Kinesthetic, Sense, Proprioception, and Posture. (35593)  00 minutes of Therapeutic Activities (TA) to improve functional performance. (23889)  14 minutes of Therapeutic Exercise (TE) to develop strength, endurance, ROM, and flexibility. (46192)  00 minutes of Unattended Electrical Stimulation (ES) for muscle performance and/or pain modulation. (17865)  00 Minutes of Vasopneumatic Device Therapy () for management of swelling/edema. (17741)  BFR: Blood flow restriction applied during exercise  NP: Not Performed    * indicates that exercise was performed, not billed today since patient was not under direct one-on-one supervision     Patient Education and Home Exercises         Education provided:   Physical therapy diagnosis, prognosis, and plan of care.     Written Home Exercises Provided: Patient instructed to cont prior HEP.  Exercises were reviewed and Sacha was able to demonstrate them prior to the end of the session.  Sacha demonstrated good  understanding of the education provided.     See EMR under Patient Instructions for exercises provided prior visit.    Assessment     1/27- Patient continues to demonstrate limitations in scapular protraction and upward rotation with closed and open chain shoulder flexion. Noted deficits in lat flexibility right greater than left, improved following stretching.    Sacha Is progressing well towards his goals.   Pt prognosis is Good.     Pt will continue to benefit from skilled outpatient physical therapy to address the deficits listed in the problem list box on initial evaluation, provide pt/family education and to maximize pt's level of independence in the home and community environment.     Pt's spiritual,  cultural and educational needs considered and pt agreeable to plan of care and goals.    Anticipated barriers to physical therapy: co-morbidities and chronicity of condition     Goals:     Short Term Goals:  6 weeks Status  Date Met   PAIN: Pt will report worst pain of 2/10 in order to progress toward max functional ability and improve quality of life. [x] Progressing  [] Met  [] Not Met     FUNCTION: Patient will demonstrate improved function as indicated by a functional score improvement of at least 5 points on FOTO. [x] Progressing  [] Met  [] Not Met     MOBILITY: Patient will improve AROM to 50% of stated goals, listed in objective measures above, in order to progress towards independence with functional activities.  [x] Progressing  [] Met  [] Not Met     STRENGTH: Patient will improve strength to 50% of stated goals, listed in objective measures above, in order to progress towards independence with functional activities. [x] Progressing  [] Met  [] Not Met     POSTURE: Patient will correct postural deviations in sitting and standing, to decrease pain and promote long term stability.  [x] Progressing  [] Met  [] Not Met     HEP: Patient will demonstrate independence with HEP in order to progress toward functional independence. [x] Progressing  [x] Met  [] Not Met        Long Term Goals:  12 weeks Status Date Met   PAIN: Pt will report worst pain of 1/10 in order to progress toward max functional ability and improve quality of life [x] Progressing  [] Met  [] Not Met     FUNCTION: Patient will demonstrate improved function as indicated by a FOTO functional score improvement as listed in header. [x] Progressing  [] Met  [] Not Met     MOBILITY: Patient will improve AROM to stated goals, listed in objective measures above, in order to return to maximal functional potential and improve quality of life.  [x] Progressing  [] Met  [] Not Met     STRENGTH: Patient will improve strength to stated goals, listed in  objective measures above, in order to improve functional independence and quality of life.  [x] Progressing  [] Met  [] Not Met     GAIT: Patient will demonstrate normalized gait mechanics with minimal compensation in order to return to PLOF. [x] Progressing  [] Met  [] Not Met     Patient will return to normal ADL's, IADL's, community involvement, recreational activities, and work-related activities with less than or equal to 1/10 pain and maximal function.  [x] Progressing  [] Met  [] Not Met          Plan       Continue to progress per protocol and per patient tolerance      Jv Pedro, PT, DPT

## 2025-01-29 ENCOUNTER — HOSPITAL ENCOUNTER (OUTPATIENT)
Dept: RADIOLOGY | Facility: HOSPITAL | Age: 45
Discharge: HOME | End: 2025-01-29
Attending: STUDENT IN AN ORGANIZED HEALTH CARE EDUCATION/TRAINING PROGRAM
Payer: OTHER GOVERNMENT

## 2025-01-29 DIAGNOSIS — S49.91XA INJURY OF RIGHT GLENOID LABRUM: ICD-10-CM

## 2025-01-29 DIAGNOSIS — M25.511 CHRONIC RIGHT SHOULDER PAIN: ICD-10-CM

## 2025-01-29 DIAGNOSIS — G89.29 CHRONIC RIGHT SHOULDER PAIN: ICD-10-CM

## 2025-01-29 PROCEDURE — 73222 MRI JOINT UPR EXTREM W/DYE: CPT | Mod: 26,RT,, | Performed by: STUDENT IN AN ORGANIZED HEALTH CARE EDUCATION/TRAINING PROGRAM

## 2025-01-29 PROCEDURE — 25500020 PHARM REV CODE 255: Performed by: STUDENT IN AN ORGANIZED HEALTH CARE EDUCATION/TRAINING PROGRAM

## 2025-01-29 PROCEDURE — A9585 GADOBUTROL INJECTION: HCPCS | Performed by: STUDENT IN AN ORGANIZED HEALTH CARE EDUCATION/TRAINING PROGRAM

## 2025-01-29 PROCEDURE — 73222 MRI JOINT UPR EXTREM W/DYE: CPT | Mod: TC,RT

## 2025-01-29 PROCEDURE — 23350 INJECTION FOR SHOULDER X-RAY: CPT | Mod: RT,,, | Performed by: STUDENT IN AN ORGANIZED HEALTH CARE EDUCATION/TRAINING PROGRAM

## 2025-01-29 PROCEDURE — 73040 CONTRAST X-RAY OF SHOULDER: CPT | Mod: 26,RT,, | Performed by: STUDENT IN AN ORGANIZED HEALTH CARE EDUCATION/TRAINING PROGRAM

## 2025-01-29 PROCEDURE — 73040 CONTRAST X-RAY OF SHOULDER: CPT | Mod: TC,RT

## 2025-01-29 RX ORDER — GADOBUTROL 604.72 MG/ML
0.1 INJECTION INTRAVENOUS
Status: COMPLETED | OUTPATIENT
Start: 2025-01-29 | End: 2025-01-29

## 2025-01-29 RX ADMIN — GADOBUTROL 0.1 ML: 604.72 INJECTION INTRAVENOUS at 02:01

## 2025-01-29 RX ADMIN — IOHEXOL 10 ML: 350 INJECTION, SOLUTION INTRAVENOUS at 02:01

## 2025-02-03 ENCOUNTER — OFFICE VISIT (OUTPATIENT)
Dept: PSYCHIATRY | Facility: CLINIC | Age: 45
End: 2025-02-03
Payer: OTHER GOVERNMENT

## 2025-02-03 DIAGNOSIS — F43.10 PTSD (POST-TRAUMATIC STRESS DISORDER): ICD-10-CM

## 2025-02-03 PROCEDURE — 99211 OFF/OP EST MAY X REQ PHY/QHP: CPT | Mod: PBBFAC | Performed by: SOCIAL WORKER

## 2025-02-03 PROCEDURE — 99999 PR PBB SHADOW E&M-EST. PATIENT-LVL I: CPT | Mod: PBBFAC,,, | Performed by: SOCIAL WORKER

## 2025-02-03 PROCEDURE — 90791 PSYCH DIAGNOSTIC EVALUATION: CPT | Mod: ,,, | Performed by: SOCIAL WORKER
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
## 2025-02-03 NOTE — LETTER
February 7, 2025        Ashely Alcazar, PhD  [address] [place] [address] Dr. Geetha [place]O [address]             [facility]  [address] [facility]  GEETHA SERRATO [address]  Phone: [phone]  Fax: [phone]   Patient: Sacha Fregoso   MR Number: [id number]   YOB: 1980   Date of Visit: 2/3/2025       Dear Dr. Alcazar:    Thank you for referring Sacha Fregoso to me for evaluation. Below are the relevant portions of my assessment and plan of care.    If you have questions, please do not hesitate to call me. I look forward to following Sacha along with you.    Sincerely,      Kaz Manzo, LCSW           CC  No Recipients

## 2025-02-04 ENCOUNTER — CLINICAL SUPPORT (OUTPATIENT)
Facility: HOSPITAL | Age: 45
End: 2025-02-04
Payer: OTHER GOVERNMENT

## 2025-02-04 DIAGNOSIS — S86.899A ANTERIOR SHIN SPLINTS: ICD-10-CM

## 2025-02-04 DIAGNOSIS — M25.551 RIGHT HIP PAIN: ICD-10-CM

## 2025-02-04 DIAGNOSIS — G89.29 CHRONIC RIGHT SHOULDER PAIN: ICD-10-CM

## 2025-02-04 DIAGNOSIS — M22.2X1 PATELLOFEMORAL PAIN SYNDROME OF RIGHT KNEE: Primary | ICD-10-CM

## 2025-02-04 DIAGNOSIS — M25.511 CHRONIC RIGHT SHOULDER PAIN: ICD-10-CM

## 2025-02-04 DIAGNOSIS — M17.11 PRIMARY OSTEOARTHRITIS OF RIGHT KNEE: ICD-10-CM

## 2025-02-04 PROCEDURE — 97140 MANUAL THERAPY 1/> REGIONS: CPT | Mod: PN | Performed by: PHYSICAL THERAPIST

## 2025-02-04 NOTE — PROGRESS NOTES
[facility]   Physical Therapy Treatment Note      Name: Sacha Fregoso  Clinic Number: [id number]    Therapy Diagnosis:   No diagnosis found.      Physician: Zuri Knowles MD    Physician Orders: PT Eval and Treat   Medical Diagnosis from Referral: Neck pain [M54.2], Chronic right shoulder pain [M25.511, G89.29], Anterior shin splints [S86.899A]   Evaluation Date: 11/26/2024  Authorization Period Expiration: 12/31/24  Plan of Care Expiration: 2/26/25  Progress Note Due: 3/4/25  Visit # / Visits authorized: 7/11    Time In: 0700  Time Out: 0815  Total Billable Time: 15 minutes Billing reflects 1:1 direct supervision    MD follow-up:    Precautions: Standard    FOTO:  Goal: 73%  -Intake: 67%  -Status: 65%  -Discharge: incomplete    Subjective     Pt reports: 2/4 - Had MRI last Wednesday. Showed partial thickness tear of the supraspinatus and complete tear of the labrum.  He was compliant with home exercise program.  Response to previous treatment: Improving symptoms  Functional change: Gradually improving function    Pain: Moderate/10  Location: R shoulder, R hip, B shins, lateral elbow    Objective      Objective Measures updated at progress report unless specified otherwise.  FUNCTIONAL MOVEMENT PATTERNS            Movement  Analysis Notes    Shoulder flexion []Functional  [x]Dysfunctional: []Painful  [x]Non-Painful R shoulder superior glide, scapular elevation   Upper Limb Neurodynamic testing:    Right Left   ULTT 1 (Median) NT NT   ULTT 2B (Radial) NT NT   ULTT 3 (Ulnar) NT NT            RANGE OF MOTION:   Cervical Right   (spine) Left     Pain/Dysfunction with Movement Goal   Cervical Flexion (60º) 75% ---       Cervical Extension (80º) 75% ---       Cervical Side Bending (45º) 75% 75%       Cervical Rotation (75º) 75% 75%           Shoulder AROM/PROM Right Left Pain/Dysfunction with Movement Goal   Shoulder Flexion (180º) 160 165 R shrug decreased      Shoulder Abduction  (180º)           Shoulder Extension (60º)           Shoulder ER  at 90º (90º) 95 95       Shoulder IR at 90º (70º) 60 60       Functional ER           Functional IR               L/E MMT Right  (spine) Left Pain/Dysfunction with Movement Goal   Shoulder scaption 35.5 30.2  pain 4+/5 B   Shoulder ER 37.0 35.5       Shoulder IR 29.7 43.6 pain     Middle trap 19.6 39.9                MUSCLE LENGTH:   Muscle Tested  Right Left  Limitation Goal   Latisimus dorsi [] Normal  [x] Limited [x] Normal  [] Limited   Normal B   Pectoralis Minor [] Normal  [x] Limited [x] Normal  [] Limited   Normal B         Problem list:  R humeral anterior glide  R scapular abdcution, internal rotation,   B ankle pronation  R hip flexion adduction  Cervical extension/rotation      Treatment     Derrington received the treatments listed below:      CPT Code  Intervention  Date/Notes  2/4/25    MT  Manual Therapy  Supine/seatedthoracic gapping Gr5   GH centration   Scapular upward rotation    TE * Kneeling Lat stretch  30s x 3    TE * Standing Pec stretch  30s x 3    NR * Prone W to Y  3 x 10    TE * Wall serratus reach  3 x 8    NR * Shoulder ER  5# 3 x 10    NR * Serratus wall slide  2 x 15    NR * Single arm serratus press- hand on table  3 x 10    TE *  carries  10# 3 laps    NR * Landmine press  25# 3 x 12      15 minutes of Manual therapy (MT) to improve pain and ROM. (49626)  00 minutes of Neuromuscular Re-Education (NMR)  to improve: Balance, Coordination, Kinesthetic, Sense, Proprioception, and Posture. (95625)  00 minutes of Therapeutic Activities (TA) to improve functional performance. (32724)  00 minutes of Therapeutic Exercise (TE) to develop strength, endurance, ROM, and flexibility. (26885)  00 minutes of Unattended Electrical Stimulation (ES) for muscle performance and/or pain modulation. (24577)  00 Minutes of Vasopneumatic Device Therapy () for management of swelling/edema. (77092)  BFR: Blood flow restriction applied  during exercise  NP: Not Performed    * indicates that exercise was performed, not billed today since patient was not under direct one-on-one supervision     Patient Education and Home Exercises         Education provided:   Physical therapy diagnosis, prognosis, and plan of care.     Written Home Exercises Provided: Patient instructed to cont prior HEP.  Exercises were reviewed and Sacha was able to demonstrate them prior to the end of the session.  Sacha demonstrated good  understanding of the education provided.     See EMR under Patient Instructions for exercises provided prior visit.    Assessment     2/4 - Patient with notable improvements in shoulder strength notably external rotation strength. Patient demonstrates improvements in shoulder range of motion and scapulothoracic mobility. Restoring optimal scapular abduction and upward rotation continues to be the primary goal with therapy. Contributions from impaired thoracic extension mobility and decreased    Derrington Is progressing well towards his goals.   Pt prognosis is Good.     Pt will continue to benefit from skilled outpatient physical therapy to address the deficits listed in the problem list box on initial evaluation, provide pt/family education and to maximize pt's level of independence in the home and community environment.     Pt's spiritual, cultural and educational needs considered and pt agreeable to plan of care and goals.    Anticipated barriers to physical therapy: co-morbidities and chronicity of condition     Goals:     Short Term Goals:  6 weeks Status  Date Met   PAIN: Pt will report worst pain of 2/10 in order to progress toward max functional ability and improve quality of life. [x] Progressing  [] Met  [] Not Met     FUNCTION: Patient will demonstrate improved function as indicated by a functional score improvement of at least 5 points on FOTO. [x] Progressing  [] Met  [] Not Met     MOBILITY: Patient will improve AROM to  50% of stated goals, listed in objective measures above, in order to progress towards independence with functional activities.  [x] Progressing  [] Met  [] Not Met     STRENGTH: Patient will improve strength to 50% of stated goals, listed in objective measures above, in order to progress towards independence with functional activities. [x] Progressing  [] Met  [] Not Met     POSTURE: Patient will correct postural deviations in sitting and standing, to decrease pain and promote long term stability.  [x] Progressing  [] Met  [] Not Met     HEP: Patient will demonstrate independence with HEP in order to progress toward functional independence. [x] Progressing  [x] Met  [] Not Met        Long Term Goals:  12 weeks Status Date Met   PAIN: Pt will report worst pain of 1/10 in order to progress toward max functional ability and improve quality of life [x] Progressing  [] Met  [] Not Met     FUNCTION: Patient will demonstrate improved function as indicated by a FOTO functional score improvement as listed in header. [x] Progressing  [] Met  [] Not Met     MOBILITY: Patient will improve AROM to stated goals, listed in objective measures above, in order to return to maximal functional potential and improve quality of life.  [x] Progressing  [] Met  [] Not Met     STRENGTH: Patient will improve strength to stated goals, listed in objective measures above, in order to improve functional independence and quality of life.  [x] Progressing  [] Met  [] Not Met     GAIT: Patient will demonstrate normalized gait mechanics with minimal compensation in order to return to PLOF. [x] Progressing  [] Met  [] Not Met     Patient will return to normal ADL's, IADL's, community involvement, recreational activities, and work-related activities with less than or equal to 1/10 pain and maximal function.  [x] Progressing  [] Met  [] Not Met          Plan       Continue to progress per protocol and per patient tolerance      Jv Pedro, PT,  DPT

## 2025-02-05 ENCOUNTER — LAB VISIT (OUTPATIENT)
Dept: LAB | Facility: HOSPITAL | Age: 45
End: 2025-02-05
Attending: FAMILY MEDICINE
Payer: OTHER GOVERNMENT

## 2025-02-05 DIAGNOSIS — Z00.00 ROUTINE GENERAL MEDICAL EXAMINATION AT A HEALTH CARE FACILITY: ICD-10-CM

## 2025-02-05 DIAGNOSIS — G47.00 INSOMNIA, UNSPECIFIED TYPE: ICD-10-CM

## 2025-02-05 DIAGNOSIS — F43.10 PTSD (POST-TRAUMATIC STRESS DISORDER): ICD-10-CM

## 2025-02-05 DIAGNOSIS — G25.81 RESTLESS LEGS: ICD-10-CM

## 2025-02-05 LAB
25(OH)D3+25(OH)D2 SERPL-MCNC: 29 NG/ML (ref 30–96)
ALBUMIN SERPL BCP-MCNC: 4.1 G/DL (ref 3.5–5.2)
ALP SERPL-CCNC: 56 U/L (ref 40–150)
ALT SERPL W/O P-5'-P-CCNC: 25 U/L (ref 10–44)
ANION GAP SERPL CALC-SCNC: 8 MMOL/L (ref 8–16)
AST SERPL-CCNC: 23 U/L (ref 10–40)
BILIRUB SERPL-MCNC: 0.8 MG/DL (ref 0.1–1)
BUN SERPL-MCNC: 15 MG/DL (ref 6–20)
CALCIUM SERPL-MCNC: 9.2 MG/DL (ref 8.7–10.5)
CHLORIDE SERPL-SCNC: 106 MMOL/L (ref 95–110)
CHOLEST SERPL-MCNC: 192 MG/DL (ref 120–199)
CHOLEST/HDLC SERPL: 3.8 {RATIO} (ref 2–5)
CO2 SERPL-SCNC: 24 MMOL/L (ref 23–29)
COMPLEXED PSA SERPL-MCNC: 0.89 NG/ML (ref 0–4)
CREAT SERPL-MCNC: 0.9 MG/DL (ref 0.5–1.4)
ERYTHROCYTE [DISTWIDTH] IN BLOOD BY AUTOMATED COUNT: 12.2 % (ref 11.5–14.5)
EST. GFR  (NO RACE VARIABLE): >60 ML/MIN/1.73 M^2
ESTIMATED AVG GLUCOSE: 91 MG/DL (ref 68–131)
FERRITIN SERPL-MCNC: 229 NG/ML (ref 20–300)
GLUCOSE SERPL-MCNC: 88 MG/DL (ref 70–110)
HBA1C MFR BLD: 4.8 % (ref 4–5.6)
HCT VFR BLD AUTO: 45.3 % (ref 40–54)
HDLC SERPL-MCNC: 51 MG/DL (ref 40–75)
HDLC SERPL: 26.6 % (ref 20–50)
HGB BLD-MCNC: 15.6 G/DL (ref 14–18)
LDLC SERPL CALC-MCNC: 122.6 MG/DL (ref 63–159)
MCH RBC QN AUTO: 29.9 PG (ref 27–31)
MCHC RBC AUTO-ENTMCNC: 34.4 G/DL (ref 32–36)
MCV RBC AUTO: 87 FL (ref 82–98)
NONHDLC SERPL-MCNC: 141 MG/DL
PLATELET # BLD AUTO: 198 K/UL (ref 150–450)
PMV BLD AUTO: 11.9 FL (ref 9.2–12.9)
POTASSIUM SERPL-SCNC: 4.5 MMOL/L (ref 3.5–5.1)
PROT SERPL-MCNC: 6.8 G/DL (ref 6–8.4)
RBC # BLD AUTO: 5.22 M/UL (ref 4.6–6.2)
SODIUM SERPL-SCNC: 138 MMOL/L (ref 136–145)
T4 FREE SERPL-MCNC: 0.88 NG/DL (ref 0.71–1.51)
TRIGL SERPL-MCNC: 92 MG/DL (ref 30–150)
TSH SERPL DL<=0.005 MIU/L-ACNC: 4.11 UIU/ML (ref 0.4–4)
URATE SERPL-MCNC: 5 MG/DL (ref 3.4–7)
WBC # BLD AUTO: 4.83 K/UL (ref 3.9–12.7)

## 2025-02-05 PROCEDURE — 82306 VITAMIN D 25 HYDROXY: CPT | Performed by: FAMILY MEDICINE

## 2025-02-05 PROCEDURE — 80053 COMPREHEN METABOLIC PANEL: CPT | Performed by: FAMILY MEDICINE

## 2025-02-05 PROCEDURE — 84443 ASSAY THYROID STIM HORMONE: CPT | Performed by: FAMILY MEDICINE

## 2025-02-05 PROCEDURE — 83921 ORGANIC ACID SINGLE QUANT: CPT | Performed by: FAMILY MEDICINE

## 2025-02-05 PROCEDURE — 36415 COLL VENOUS BLD VENIPUNCTURE: CPT | Mod: PN | Performed by: FAMILY MEDICINE

## 2025-02-05 PROCEDURE — 84153 ASSAY OF PSA TOTAL: CPT | Performed by: FAMILY MEDICINE

## 2025-02-05 PROCEDURE — 82607 VITAMIN B-12: CPT | Performed by: FAMILY MEDICINE

## 2025-02-05 PROCEDURE — 84439 ASSAY OF FREE THYROXINE: CPT | Performed by: FAMILY MEDICINE

## 2025-02-05 PROCEDURE — 85027 COMPLETE CBC AUTOMATED: CPT | Performed by: FAMILY MEDICINE

## 2025-02-05 PROCEDURE — 82040 ASSAY OF SERUM ALBUMIN: CPT | Performed by: FAMILY MEDICINE

## 2025-02-05 PROCEDURE — 84550 ASSAY OF BLOOD/URIC ACID: CPT | Performed by: FAMILY MEDICINE

## 2025-02-05 PROCEDURE — 83036 HEMOGLOBIN GLYCOSYLATED A1C: CPT | Performed by: FAMILY MEDICINE

## 2025-02-05 PROCEDURE — 82728 ASSAY OF FERRITIN: CPT | Performed by: FAMILY MEDICINE

## 2025-02-05 PROCEDURE — 80061 LIPID PANEL: CPT | Performed by: FAMILY MEDICINE

## 2025-02-06 ENCOUNTER — CLINICAL SUPPORT (OUTPATIENT)
Facility: HOSPITAL | Age: 45
End: 2025-02-06
Payer: OTHER GOVERNMENT

## 2025-02-06 DIAGNOSIS — M25.551 RIGHT HIP PAIN: ICD-10-CM

## 2025-02-06 DIAGNOSIS — M17.11 PRIMARY OSTEOARTHRITIS OF RIGHT KNEE: ICD-10-CM

## 2025-02-06 DIAGNOSIS — G89.29 CHRONIC RIGHT SHOULDER PAIN: ICD-10-CM

## 2025-02-06 DIAGNOSIS — M25.511 CHRONIC RIGHT SHOULDER PAIN: ICD-10-CM

## 2025-02-06 DIAGNOSIS — S86.899A ANTERIOR SHIN SPLINTS: ICD-10-CM

## 2025-02-06 DIAGNOSIS — M22.2X1 PATELLOFEMORAL PAIN SYNDROME OF RIGHT KNEE: Primary | ICD-10-CM

## 2025-02-06 PROCEDURE — 97140 MANUAL THERAPY 1/> REGIONS: CPT | Mod: PN | Performed by: PHYSICAL THERAPIST

## 2025-02-06 NOTE — PROGRESS NOTES
[facility]   Physical Therapy Treatment Note      Name: Sacha Fregoso  Clinic Number: [id number]    Therapy Diagnosis:   No diagnosis found.      Physician: Zuri Knowles MD    Physician Orders: PT Eval and Treat   Medical Diagnosis from Referral: Neck pain [M54.2], Chronic right shoulder pain [M25.511, G89.29], Anterior shin splints [S86.899A]   Evaluation Date: 11/26/2024  Authorization Period Expiration: 12/31/24  Plan of Care Expiration: 2/26/25  Progress Note Due: 3/4/25  Visit # / Visits authorized: 8/11    Time In: 0700 am  Time Out: 0815 am  Total Billable Time: 15 minutes Billing reflects 1:1 direct supervision    MD follow-up:    Precautions: Standard    FOTO:  Goal: 73%  -Intake: 67%  -Status: 65%  -Discharge: incomplete    Subjective     Pt reports: 2/6- Shoulder is feeling a little bit better today.  He was compliant with home exercise program.  Response to previous treatment: Improving symptoms  Functional change: Gradually improving function    Pain: Moderate/10  Location: R shoulder, R hip, B shins, lateral elbow    Objective      Objective Measures updated at progress report unless specified otherwise.    Shoulder AROM/PROM Right Left Pain/Dysfunction with Movement Goal   Shoulder Flexion (180º) 160 165 R shrug decreased      Shoulder Abduction (180º)           Shoulder Extension (60º)           Shoulder ER  at 90º (90º) 95 95       Shoulder IR at 90º (70º) 60 60       Functional ER           Functional IR               L/E MMT Right  (spine) Left Pain/Dysfunction with Movement Goal   Shoulder scaption 35.5 30.2  pain 4+/5 B   Shoulder ER 37.0 35.5       Shoulder IR 29.7 43.6 pain     Middle trap 19.6 39.9              Treatment     Sacha received the treatments listed below:      CPT Code  Intervention  Date/Notes  2/6/25    MT  Manual Therapy  Supine/seatedthoracic gapping Gr5   GH centration   Scapular upward rotation    TE * Kneeling Lat stretch  30s x 3     TE * Standing Pec stretch  30s x 3    NR * Prone W to Y  3 x 10    TE * Wall serratus reach  3 x 8    NR * Shoulder ER  5# 3 x 10    NR * Serratus wall slide  2 x 15    NR * Single arm serratus press- hand on table  3 x 10    TE *  carries  10# 3 laps    NR * Landmine press  25# 3 x 12      15 minutes of Manual therapy (MT) to improve pain and ROM. (65728)  00 minutes of Neuromuscular Re-Education (NMR)  to improve: Balance, Coordination, Kinesthetic, Sense, Proprioception, and Posture. (42378)  00 minutes of Therapeutic Activities (TA) to improve functional performance. (71235)  00 minutes of Therapeutic Exercise (TE) to develop strength, endurance, ROM, and flexibility. (43102)  00 minutes of Unattended Electrical Stimulation (ES) for muscle performance and/or pain modulation. (62211)  00 Minutes of Vasopneumatic Device Therapy () for management of swelling/edema. (20448)  BFR: Blood flow restriction applied during exercise  NP: Not Performed    * indicates that exercise was performed, not billed today since patient was not under direct one-on-one supervision     Patient Education and Home Exercises         Education provided:   Physical therapy diagnosis, prognosis, and plan of care.     Written Home Exercises Provided: Patient instructed to cont prior HEP.  Exercises were reviewed and Sacha was able to demonstrate them prior to the end of the session.  Sacha demonstrated good  understanding of the education provided.     See EMR under Patient Instructions for exercises provided prior visit.    Assessment     2/6 - Patient with notable improvements in shoulder strength notably external rotation strength. Patient demonstrates improvements in shoulder range of motion and scapulothoracic mobility. Restoring optimal scapular abduction and upward rotation continues to be the primary goal with therapy. Contributions from impaired thoracic extension mobility and decreased    Derrington Is  progressing well towards his goals.   Pt prognosis is Good.     Pt will continue to benefit from skilled outpatient physical therapy to address the deficits listed in the problem list box on initial evaluation, provide pt/family education and to maximize pt's level of independence in the home and community environment.     Pt's spiritual, cultural and educational needs considered and pt agreeable to plan of care and goals.    Anticipated barriers to physical therapy: co-morbidities and chronicity of condition     Goals:     Short Term Goals:  6 weeks Status  Date Met   PAIN: Pt will report worst pain of 2/10 in order to progress toward max functional ability and improve quality of life. [x] Progressing  [] Met  [] Not Met     FUNCTION: Patient will demonstrate improved function as indicated by a functional score improvement of at least 5 points on FOTO. [x] Progressing  [] Met  [] Not Met     MOBILITY: Patient will improve AROM to 50% of stated goals, listed in objective measures above, in order to progress towards independence with functional activities.  [x] Progressing  [] Met  [] Not Met     STRENGTH: Patient will improve strength to 50% of stated goals, listed in objective measures above, in order to progress towards independence with functional activities. [x] Progressing  [] Met  [] Not Met     POSTURE: Patient will correct postural deviations in sitting and standing, to decrease pain and promote long term stability.  [x] Progressing  [] Met  [] Not Met     HEP: Patient will demonstrate independence with HEP in order to progress toward functional independence. [x] Progressing  [x] Met  [] Not Met        Long Term Goals:  12 weeks Status Date Met   PAIN: Pt will report worst pain of 1/10 in order to progress toward max functional ability and improve quality of life [x] Progressing  [] Met  [] Not Met     FUNCTION: Patient will demonstrate improved function as indicated by a FOTO functional score improvement as  listed in header. [x] Progressing  [] Met  [] Not Met     MOBILITY: Patient will improve AROM to stated goals, listed in objective measures above, in order to return to maximal functional potential and improve quality of life.  [x] Progressing  [] Met  [] Not Met     STRENGTH: Patient will improve strength to stated goals, listed in objective measures above, in order to improve functional independence and quality of life.  [x] Progressing  [] Met  [] Not Met     GAIT: Patient will demonstrate normalized gait mechanics with minimal compensation in order to return to PLOF. [x] Progressing  [] Met  [] Not Met     Patient will return to normal ADL's, IADL's, community involvement, recreational activities, and work-related activities with less than or equal to 1/10 pain and maximal function.  [x] Progressing  [] Met  [] Not Met          Plan       Continue to progress per protocol and per patient tolerance      Jv Pedro, PT, DPT

## 2025-02-07 LAB — VIT B12 SERPL-MCNC: 341 NG/L (ref 180–914)

## 2025-02-08 LAB — METHYLMALONATE SERPL-SCNC: 0.12 NMOL/ML

## 2025-02-12 LAB
ALBUMIN SERPL-MCNC: 4.5 G/DL (ref 3.6–5.1)
SHBG SERPL-SCNC: 24 NMOL/L (ref 10–50)
TESTOST FREE SERPL-MCNC: 70.1 PG/ML (ref 46–224)
TESTOST SERPL-MCNC: 415 NG/DL (ref 250–1100)
TESTOSTERONE.FREE+WB SERPL-MCNC: 144.2 NG/DL

## 2025-02-16 ENCOUNTER — RESULTS FOLLOW-UP (OUTPATIENT)
Dept: PRIMARY CARE CLINIC | Facility: CLINIC | Age: 45
End: 2025-02-16
Payer: OTHER GOVERNMENT

## 2025-02-16 DIAGNOSIS — R79.89 ELEVATED TSH: Primary | ICD-10-CM

## 2025-02-16 DIAGNOSIS — E55.9 VITAMIN D DEFICIENCY: ICD-10-CM

## 2025-02-17 ENCOUNTER — OFFICE VISIT (OUTPATIENT)
Dept: PSYCHIATRY | Facility: CLINIC | Age: 45
End: 2025-02-17
Payer: OTHER GOVERNMENT

## 2025-02-17 DIAGNOSIS — F43.10 PTSD (POST-TRAUMATIC STRESS DISORDER): Primary | ICD-10-CM

## 2025-02-17 NOTE — PROGRESS NOTES
"Individual Psychotherapy (PhD/LCSW)    2/17/2025    [facility]:  [facility]         Therapeutic Intervention: Met with patient.  Outpatient - Insight oriented psychotherapy 45 min - CPT code 52741    Chief complaint/reason for encounter: anxiety and sleep     Interval history and content of current session: Patient presents to ongoing individual therapy due to anxiety and insomnia.  He was last in session on 2/3/25.  He reports a significant amount of joint pain this morning.  He has been up since 330 am due to the pain.  He had an active Saturday.  He got up to run three miles, went to the zoo with his family, and he then did yard work.  He did rest yesterday.  He has been struggling with insomnia since his deployment in  2008.  When he started running, it helped his sleep to improve.  The joint pain has been a barrier to running.  He goes to physical therapy on a regular basis to "keep moving."  The joint pain comes from all of the moving he did in the [employer] over 25 years.  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  He will report to [employer] ([employer]) to process out the [employer] in coming months.  His wife will go with him.  Explore sources that the patient could use to encourage positive thoughts.  Educate the patient about the need for structure, socialization, and sense of accomplishment.  Note that chronic pain has a negative impact on his mental health.  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  They retired last year.  One friend lives in [place] and the other lives in [place].  He has been leaning on them for support and advice.  He is hoping to spend the spring and summer at home with his three children.  His oldest child will be going to high school.  She chose to attend [employer].  He worries about the connection with his wife.  She is stressed about her job working in oncology.  He acknowledges that she is in charge of her own self care.    Treatment plan:  Target symptoms: " anxiety   Why chosen therapy is appropriate versus another modality: relevant to diagnosis  Outcome monitoring methods: self-report, observation  Therapeutic intervention type: insight oriented psychotherapy, supportive psychotherapy, interactive psychotherapy    Risk parameters:  Patient reports no suicidal ideation  Patient reports no homicidal ideation  Patient reports no self-injurious behavior  Patient reports no violent behavior    Verbal deficits: None    Patient's response to intervention:  The patient's response to intervention is accepting, motivated.    Progress toward goals and other mental status changes:  The patient's progress toward goals is fair .    Diagnosis:   Post Traumatic Stress Disorder    Plan:  individual psychotherapy    Return to clinic: as scheduled    Length of Service (minutes): 45

## 2025-02-17 NOTE — PROGRESS NOTES
Vit D levels low. Elevated tsh, normal free t4.   Repeat labs in 3 months. Starting otc vitamin D3

## 2025-02-18 ENCOUNTER — PATIENT MESSAGE (OUTPATIENT)
Dept: SPORTS MEDICINE | Facility: CLINIC | Age: 45
End: 2025-02-18
Payer: OTHER GOVERNMENT

## 2025-04-09 ENCOUNTER — OFFICE VISIT (OUTPATIENT)
Dept: PSYCHIATRY | Facility: CLINIC | Age: 45
End: 2025-04-09
Payer: OTHER GOVERNMENT

## 2025-04-09 DIAGNOSIS — F43.10 PTSD (POST-TRAUMATIC STRESS DISORDER): Primary | ICD-10-CM

## 2025-04-09 PROCEDURE — 90834 PSYTX W PT 45 MINUTES: CPT | Mod: ,,, | Performed by: SOCIAL WORKER

## 2025-04-11 NOTE — PROGRESS NOTES
Individual Psychotherapy (PhD/LCSW)    4/9/2025    Site:  [facility]         Therapeutic Intervention: Met with patient.  Outpatient - Insight oriented psychotherapy 45 min - CPT code 19723    Session observed by hospitals SW intern, Cristina Aguilar    Chief complaint/reason for encounter: anxiety and sleep     Interval history and content of current session: Patient presents to ongoing individual therapy due to anxiety and insomnia. He was last in session on 2/17/25.  Patient is in the process of being processed out of the .  He has been spending more quality time with his wife and children.  He states they are financially supported by wifes salary as a nurse, his custodial, and a possible disability stipend.  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  He wants to provide a better life for his family.  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       Explored his desire to provide more for his family, despite their financial stability.   Note that time spent with his kids is invaluable.  Suggested writing down the pros and cons of certain careers to more solidly conceptualize his options.  Emphasized the need to regulate his nervous system with grounding techniques such as breathing and meditation.      He reports that his wife stopped him on his way out of the house to ask him to take one of their children his/her lunch at school since it had been forgotten.  He snapped at her, blaming her for not ensuring their child had the lunch.  He realized immediately that he was in the wrong.  He states that his anger is always right below the surface.  His nervous system is chronically activated from his time in the .  He continues to exercise regularly.  He continues to have chronic pain due to bone spurs.  He is worried about the stress his wife has working in oncology.    Treatment plan:  Target symptoms:  anxiety   Why chosen therapy is appropriate versus another modality: relevant to diagnosis  Outcome monitoring methods: self-report, observation  Therapeutic intervention type: insight oriented psychotherapy, supportive psychotherapy, interactive psychotherapy     Risk parameters:  Patient reports no suicidal ideation  Patient reports no homicidal ideation  Patient reports no self-injurious behavior  Patient reports no violent behavior     Verbal deficits: None     Patient's response to intervention:  The patient's response to intervention is accepting, motivated.     Progress toward goals and other mental status changes:  The patient's progress toward goals is fair .     Diagnosis:   Post Traumatic Stress Disorder     Plan:  individual psychotherapy     Return to clinic: as scheduled     Length of Service (minutes): 45

## 2025-04-30 ENCOUNTER — OFFICE VISIT (OUTPATIENT)
Dept: PSYCHIATRY | Facility: CLINIC | Age: 45
End: 2025-04-30
Payer: OTHER GOVERNMENT

## 2025-04-30 DIAGNOSIS — F43.10 PTSD (POST-TRAUMATIC STRESS DISORDER): Primary | ICD-10-CM

## 2025-04-30 PROCEDURE — 90834 PSYTX W PT 45 MINUTES: CPT | Mod: ,,, | Performed by: SOCIAL WORKER

## 2025-04-30 NOTE — PROGRESS NOTES
Individual Psychotherapy (PhD/LCSW)    4/30/2025    Site:  [facility]         Therapeutic Intervention: Met with patient.  Outpatient - Insight oriented psychotherapy 45 min - CPT code 91447    Chief complaint/reason for encounter: anxiety and sleep     Interval history and content of current session:  Patient presents to ongoing individual therapy due to anxiety and insomnia. He was last in session on 4/9/25.  He will be officially retired at the end of May.  He did go to a job fair this morning.  He was talking to someone with the [facility] about possible job training.  He has an interview with a  job.  He is not sure if it is the job for him.  His father is about to retire at 70.  His father has been in industrial sales.  His parents live in [facility] in the same house the patient grew up in.  The patient worries it is too big for his parents.  He is having a difficult time turning off his mind.  He feels that he should be earning something.  He feels he has an opportunity to give his children a better life.  Note that he has the opportunity to be with them during the summer.  Reflect the he can make memories of him if he puts the job search on pause.  Emphasize that he has worked hard for this break.  He plans to start writing down what is in his head.  He does have two friends that are retired from the .  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  The patient admits they have the stability of his wife's job as a nurse practitioner.  The patient continues to exercise on a regular basis.    Treatment plan:  Target symptoms: anxiety   Why chosen therapy is appropriate versus another modality: relevant to diagnosis  Outcome monitoring methods: self-report, observation  Therapeutic intervention type: insight oriented psychotherapy, supportive psychotherapy, interactive psychotherapy     Risk parameters:  Patient reports no suicidal ideation  Patient reports no homicidal  ideation  Patient reports no self-injurious behavior  Patient reports no violent behavior     Verbal deficits: None     Patient's response to intervention:  The patient's response to intervention is accepting, motivated.     Progress toward goals and other mental status changes:  The patient's progress toward goals is fair .     Diagnosis:   Post Traumatic Stress Disorder     Plan:  individual psychotherapy     Return to clinic: as scheduled     Length of Service (minutes): 45